# Patient Record
Sex: FEMALE | Race: BLACK OR AFRICAN AMERICAN | NOT HISPANIC OR LATINO | Employment: FULL TIME
[De-identification: names, ages, dates, MRNs, and addresses within clinical notes are randomized per-mention and may not be internally consistent; named-entity substitution may affect disease eponyms.]

---

## 2017-10-28 ENCOUNTER — HEALTH MAINTENANCE LETTER (OUTPATIENT)
Age: 22
End: 2017-10-28

## 2019-05-20 PROCEDURE — 96361 HYDRATE IV INFUSION ADD-ON: CPT

## 2019-05-20 PROCEDURE — 96366 THER/PROPH/DIAG IV INF ADDON: CPT

## 2019-05-20 PROCEDURE — 96365 THER/PROPH/DIAG IV INF INIT: CPT

## 2019-05-20 PROCEDURE — 99285 EMERGENCY DEPT VISIT HI MDM: CPT | Mod: 25

## 2019-05-21 ENCOUNTER — HOSPITAL ENCOUNTER (INPATIENT)
Facility: CLINIC | Age: 24
LOS: 4 days | Discharge: HOME | DRG: 638 | End: 2019-05-25
Attending: EMERGENCY MEDICINE | Admitting: HOSPITALIST
Payer: COMMERCIAL

## 2019-05-21 DIAGNOSIS — E10.10 DIABETIC KETOACIDOSIS WITHOUT COMA ASSOCIATED WITH TYPE 1 DIABETES MELLITUS (H): Primary | ICD-10-CM

## 2019-05-21 DIAGNOSIS — R06.02 SHORTNESS OF BREATH: ICD-10-CM

## 2019-05-21 PROBLEM — E11.10 DIABETIC KETO-ACIDOSIS (H): Status: ACTIVE | Noted: 2019-05-21

## 2019-05-21 LAB
ALBUMIN SERPL-MCNC: 4.1 G/DL (ref 3.4–5)
ALBUMIN UR-MCNC: NEGATIVE MG/DL
ALP SERPL-CCNC: 121 U/L (ref 40–150)
ALT SERPL W P-5'-P-CCNC: 26 U/L (ref 0–50)
ANION GAP SERPL CALCULATED.3IONS-SCNC: 11 MMOL/L (ref 3–14)
ANION GAP SERPL CALCULATED.3IONS-SCNC: 14 MMOL/L (ref 3–14)
APPEARANCE UR: CLEAR
AST SERPL W P-5'-P-CCNC: 14 U/L (ref 0–45)
BASE DEFICIT BLDV-SCNC: 10.7 MMOL/L
BASOPHILS # BLD AUTO: 0 10E9/L (ref 0–0.2)
BASOPHILS # BLD AUTO: 0 10E9/L (ref 0–0.2)
BASOPHILS NFR BLD AUTO: 0.2 %
BASOPHILS NFR BLD AUTO: 0.2 %
BILIRUB DIRECT SERPL-MCNC: 0.1 MG/DL (ref 0–0.2)
BILIRUB SERPL-MCNC: 0.5 MG/DL (ref 0.2–1.3)
BILIRUB UR QL STRIP: NEGATIVE
BUN SERPL-MCNC: 5 MG/DL (ref 7–30)
BUN SERPL-MCNC: 6 MG/DL (ref 7–30)
BUN SERPL-MCNC: 6 MG/DL (ref 7–30)
BUN SERPL-MCNC: 8 MG/DL (ref 7–30)
CALCIUM SERPL-MCNC: 8.4 MG/DL (ref 8.5–10.1)
CALCIUM SERPL-MCNC: 8.6 MG/DL (ref 8.5–10.1)
CALCIUM SERPL-MCNC: 8.8 MG/DL (ref 8.5–10.1)
CALCIUM SERPL-MCNC: 8.8 MG/DL (ref 8.5–10.1)
CHLORIDE SERPL-SCNC: 105 MMOL/L (ref 94–109)
CHLORIDE SERPL-SCNC: 106 MMOL/L (ref 94–109)
CHLORIDE SERPL-SCNC: 108 MMOL/L (ref 94–109)
CHLORIDE SERPL-SCNC: 99 MMOL/L (ref 94–109)
CO2 SERPL-SCNC: 16 MMOL/L (ref 20–32)
CO2 SERPL-SCNC: 18 MMOL/L (ref 20–32)
CO2 SERPL-SCNC: 19 MMOL/L (ref 20–32)
CO2 SERPL-SCNC: 20 MMOL/L (ref 20–32)
COLOR UR AUTO: ABNORMAL
CREAT SERPL-MCNC: 0.54 MG/DL (ref 0.52–1.04)
CREAT SERPL-MCNC: 0.56 MG/DL (ref 0.52–1.04)
CREAT SERPL-MCNC: 0.58 MG/DL (ref 0.52–1.04)
CREAT SERPL-MCNC: 0.62 MG/DL (ref 0.52–1.04)
DIFFERENTIAL METHOD BLD: NORMAL
DIFFERENTIAL METHOD BLD: NORMAL
EOSINOPHIL # BLD AUTO: 0.1 10E9/L (ref 0–0.7)
EOSINOPHIL # BLD AUTO: 0.1 10E9/L (ref 0–0.7)
EOSINOPHIL NFR BLD AUTO: 1.8 %
EOSINOPHIL NFR BLD AUTO: 2 %
ERYTHROCYTE [DISTWIDTH] IN BLOOD BY AUTOMATED COUNT: 12.4 % (ref 10–15)
ERYTHROCYTE [DISTWIDTH] IN BLOOD BY AUTOMATED COUNT: 12.7 % (ref 10–15)
GFR SERPL CREATININE-BSD FRML MDRD: >90 ML/MIN/{1.73_M2}
GLUCOSE BLDC GLUCOMTR-MCNC: 147 MG/DL (ref 70–99)
GLUCOSE BLDC GLUCOMTR-MCNC: 148 MG/DL (ref 70–99)
GLUCOSE BLDC GLUCOMTR-MCNC: 155 MG/DL (ref 70–99)
GLUCOSE BLDC GLUCOMTR-MCNC: 170 MG/DL (ref 70–99)
GLUCOSE BLDC GLUCOMTR-MCNC: 177 MG/DL (ref 70–99)
GLUCOSE BLDC GLUCOMTR-MCNC: 180 MG/DL (ref 70–99)
GLUCOSE BLDC GLUCOMTR-MCNC: 184 MG/DL (ref 70–99)
GLUCOSE BLDC GLUCOMTR-MCNC: 217 MG/DL (ref 70–99)
GLUCOSE BLDC GLUCOMTR-MCNC: 259 MG/DL (ref 70–99)
GLUCOSE BLDC GLUCOMTR-MCNC: 278 MG/DL (ref 70–99)
GLUCOSE BLDC GLUCOMTR-MCNC: 286 MG/DL (ref 70–99)
GLUCOSE BLDC GLUCOMTR-MCNC: 347 MG/DL (ref 70–99)
GLUCOSE BLDC GLUCOMTR-MCNC: 380 MG/DL (ref 70–99)
GLUCOSE BLDC GLUCOMTR-MCNC: 443 MG/DL (ref 70–99)
GLUCOSE BLDC GLUCOMTR-MCNC: 569 MG/DL (ref 70–99)
GLUCOSE SERPL-MCNC: 177 MG/DL (ref 70–99)
GLUCOSE SERPL-MCNC: 266 MG/DL (ref 70–99)
GLUCOSE SERPL-MCNC: 297 MG/DL (ref 70–99)
GLUCOSE SERPL-MCNC: 591 MG/DL (ref 70–99)
GLUCOSE UR STRIP-MCNC: >1000 MG/DL
HBA1C MFR BLD: 10.4 % (ref 0–5.6)
HCG SERPL QL: NEGATIVE
HCO3 BLDV-SCNC: 15 MMOL/L (ref 21–28)
HCT VFR BLD AUTO: 39.8 % (ref 35–47)
HCT VFR BLD AUTO: 41.5 % (ref 35–47)
HGB BLD-MCNC: 14.3 G/DL (ref 11.7–15.7)
HGB BLD-MCNC: 14.7 G/DL (ref 11.7–15.7)
HGB UR QL STRIP: ABNORMAL
IMM GRANULOCYTES # BLD: 0 10E9/L (ref 0–0.4)
IMM GRANULOCYTES # BLD: 0 10E9/L (ref 0–0.4)
IMM GRANULOCYTES NFR BLD: 0.2 %
IMM GRANULOCYTES NFR BLD: 0.3 %
KETONES BLD-SCNC: 0.9 MMOL/L (ref 0–0.6)
KETONES BLD-SCNC: 1.3 MMOL/L (ref 0–0.6)
KETONES BLD-SCNC: 1.4 MMOL/L (ref 0–0.6)
KETONES BLD-SCNC: 1.4 MMOL/L (ref 0–0.6)
KETONES BLD-SCNC: 2.3 MMOL/L (ref 0–0.6)
KETONES BLD-SCNC: 2.5 MMOL/L (ref 0–0.6)
KETONES BLD-SCNC: 3.3 MMOL/L (ref 0–0.6)
KETONES BLD-SCNC: NORMAL MMOL/L (ref 0–0.6)
KETONES UR STRIP-MCNC: 80 MG/DL
LACTATE BLD-SCNC: 0.6 MMOL/L (ref 0.7–2)
LEUKOCYTE ESTERASE UR QL STRIP: NEGATIVE
LYMPHOCYTES # BLD AUTO: 2 10E9/L (ref 0.8–5.3)
LYMPHOCYTES # BLD AUTO: 2.2 10E9/L (ref 0.8–5.3)
LYMPHOCYTES NFR BLD AUTO: 31.8 %
LYMPHOCYTES NFR BLD AUTO: 37.1 %
MAGNESIUM SERPL-MCNC: 1.9 MG/DL (ref 1.6–2.3)
MCH RBC QN AUTO: 28.9 PG (ref 26.5–33)
MCH RBC QN AUTO: 29.9 PG (ref 26.5–33)
MCHC RBC AUTO-ENTMCNC: 35.4 G/DL (ref 31.5–36.5)
MCHC RBC AUTO-ENTMCNC: 35.9 G/DL (ref 31.5–36.5)
MCV RBC AUTO: 82 FL (ref 78–100)
MCV RBC AUTO: 83 FL (ref 78–100)
MONOCYTES # BLD AUTO: 0.4 10E9/L (ref 0–1.3)
MONOCYTES # BLD AUTO: 0.5 10E9/L (ref 0–1.3)
MONOCYTES NFR BLD AUTO: 7.1 %
MONOCYTES NFR BLD AUTO: 7.5 %
MUCOUS THREADS #/AREA URNS LPF: PRESENT /LPF
NEUTROPHILS # BLD AUTO: 3.2 10E9/L (ref 1.6–8.3)
NEUTROPHILS # BLD AUTO: 3.6 10E9/L (ref 1.6–8.3)
NEUTROPHILS NFR BLD AUTO: 53 %
NEUTROPHILS NFR BLD AUTO: 58.8 %
NITRATE UR QL: NEGATIVE
NRBC # BLD AUTO: 0 10*3/UL
NRBC BLD AUTO-RTO: 0 /100
OSMOLALITY SERPL: 290 MMOL/KG (ref 275–295)
OXYHGB MFR BLDV: 78 %
PCO2 BLDV: 34 MM HG (ref 40–50)
PH BLDV: 7.26 PH (ref 7.32–7.43)
PH UR STRIP: 5 PH (ref 5–7)
PHOSPHATE SERPL-MCNC: 2.3 MG/DL (ref 2.5–4.5)
PLATELET # BLD AUTO: 298 10E9/L (ref 150–450)
PLATELET # BLD AUTO: 324 10E9/L (ref 150–450)
PO2 BLDV: 45 MM HG (ref 25–47)
POTASSIUM SERPL-SCNC: 3.6 MMOL/L (ref 3.4–5.3)
POTASSIUM SERPL-SCNC: 3.6 MMOL/L (ref 3.4–5.3)
POTASSIUM SERPL-SCNC: 3.8 MMOL/L (ref 3.4–5.3)
POTASSIUM SERPL-SCNC: 4.3 MMOL/L (ref 3.4–5.3)
PROT SERPL-MCNC: 7.6 G/DL (ref 6.8–8.8)
RBC # BLD AUTO: 4.79 10E12/L (ref 3.8–5.2)
RBC # BLD AUTO: 5.09 10E12/L (ref 3.8–5.2)
RBC #/AREA URNS AUTO: <1 /HPF (ref 0–2)
SODIUM SERPL-SCNC: 129 MMOL/L (ref 133–144)
SODIUM SERPL-SCNC: 134 MMOL/L (ref 133–144)
SODIUM SERPL-SCNC: 136 MMOL/L (ref 133–144)
SODIUM SERPL-SCNC: 139 MMOL/L (ref 133–144)
SOURCE: ABNORMAL
SP GR UR STRIP: 1.02 (ref 1–1.03)
SQUAMOUS #/AREA URNS AUTO: 4 /HPF (ref 0–1)
UROBILINOGEN UR STRIP-MCNC: NORMAL MG/DL (ref 0–2)
WBC # BLD AUTO: 6 10E9/L (ref 4–11)
WBC # BLD AUTO: 6.2 10E9/L (ref 4–11)
WBC #/AREA URNS AUTO: 1 /HPF (ref 0–5)

## 2019-05-21 PROCEDURE — 99223 1ST HOSP IP/OBS HIGH 75: CPT | Mod: AI | Performed by: HOSPITALIST

## 2019-05-21 PROCEDURE — 80076 HEPATIC FUNCTION PANEL: CPT | Performed by: INTERNAL MEDICINE

## 2019-05-21 PROCEDURE — 84703 CHORIONIC GONADOTROPIN ASSAY: CPT | Performed by: EMERGENCY MEDICINE

## 2019-05-21 PROCEDURE — 25000131 ZZH RX MED GY IP 250 OP 636 PS 637: Performed by: INTERNAL MEDICINE

## 2019-05-21 PROCEDURE — 25800030 ZZH RX IP 258 OP 636: Performed by: HOSPITALIST

## 2019-05-21 PROCEDURE — 82010 KETONE BODYS QUAN: CPT | Performed by: INTERNAL MEDICINE

## 2019-05-21 PROCEDURE — 25000131 ZZH RX MED GY IP 250 OP 636 PS 637: Performed by: HOSPITALIST

## 2019-05-21 PROCEDURE — 25800030 ZZH RX IP 258 OP 636: Performed by: EMERGENCY MEDICINE

## 2019-05-21 PROCEDURE — 00000146 ZZHCL STATISTIC GLUCOSE BY METER IP

## 2019-05-21 PROCEDURE — 84100 ASSAY OF PHOSPHORUS: CPT | Performed by: INTERNAL MEDICINE

## 2019-05-21 PROCEDURE — 83735 ASSAY OF MAGNESIUM: CPT | Performed by: INTERNAL MEDICINE

## 2019-05-21 PROCEDURE — 25000131 ZZH RX MED GY IP 250 OP 636 PS 637: Performed by: EMERGENCY MEDICINE

## 2019-05-21 PROCEDURE — 82010 KETONE BODYS QUAN: CPT | Performed by: EMERGENCY MEDICINE

## 2019-05-21 PROCEDURE — 25000132 ZZH RX MED GY IP 250 OP 250 PS 637: Performed by: INTERNAL MEDICINE

## 2019-05-21 PROCEDURE — 36415 COLL VENOUS BLD VENIPUNCTURE: CPT | Performed by: INTERNAL MEDICINE

## 2019-05-21 PROCEDURE — 12000000 ZZH R&B MED SURG/OB

## 2019-05-21 PROCEDURE — 80048 BASIC METABOLIC PNL TOTAL CA: CPT | Performed by: EMERGENCY MEDICINE

## 2019-05-21 PROCEDURE — 82805 BLOOD GASES W/O2 SATURATION: CPT | Performed by: EMERGENCY MEDICINE

## 2019-05-21 PROCEDURE — 83036 HEMOGLOBIN GLYCOSYLATED A1C: CPT | Performed by: INTERNAL MEDICINE

## 2019-05-21 PROCEDURE — 85025 COMPLETE CBC W/AUTO DIFF WBC: CPT | Performed by: EMERGENCY MEDICINE

## 2019-05-21 PROCEDURE — 25000128 H RX IP 250 OP 636: Performed by: INTERNAL MEDICINE

## 2019-05-21 PROCEDURE — 80048 BASIC METABOLIC PNL TOTAL CA: CPT | Performed by: INTERNAL MEDICINE

## 2019-05-21 PROCEDURE — 81001 URINALYSIS AUTO W/SCOPE: CPT | Performed by: EMERGENCY MEDICINE

## 2019-05-21 PROCEDURE — 83605 ASSAY OF LACTIC ACID: CPT | Performed by: INTERNAL MEDICINE

## 2019-05-21 PROCEDURE — 85025 COMPLETE CBC W/AUTO DIFF WBC: CPT | Performed by: INTERNAL MEDICINE

## 2019-05-21 PROCEDURE — 83930 ASSAY OF BLOOD OSMOLALITY: CPT | Performed by: INTERNAL MEDICINE

## 2019-05-21 PROCEDURE — 80048 BASIC METABOLIC PNL TOTAL CA: CPT | Performed by: HOSPITALIST

## 2019-05-21 PROCEDURE — 99207 ZZC NON-BILLABLE SERV PER CHARTING: CPT | Performed by: INTERNAL MEDICINE

## 2019-05-21 PROCEDURE — 36415 COLL VENOUS BLD VENIPUNCTURE: CPT | Performed by: HOSPITALIST

## 2019-05-21 RX ORDER — DEXTROSE MONOHYDRATE 25 G/50ML
25-50 INJECTION, SOLUTION INTRAVENOUS
Status: DISCONTINUED | OUTPATIENT
Start: 2019-05-21 | End: 2019-05-25 | Stop reason: HOSPADM

## 2019-05-21 RX ORDER — AMLODIPINE BESYLATE 5 MG/1
5 TABLET ORAL DAILY
Status: DISCONTINUED | OUTPATIENT
Start: 2019-05-21 | End: 2019-05-22

## 2019-05-21 RX ORDER — ACETAMINOPHEN 325 MG/1
325-650 TABLET ORAL EVERY 6 HOURS PRN
Status: DISCONTINUED | OUTPATIENT
Start: 2019-05-21 | End: 2019-05-21

## 2019-05-21 RX ORDER — ACETAMINOPHEN 325 MG/1
650 TABLET ORAL EVERY 4 HOURS PRN
Status: DISCONTINUED | OUTPATIENT
Start: 2019-05-21 | End: 2019-05-25 | Stop reason: HOSPADM

## 2019-05-21 RX ORDER — AMOXICILLIN 250 MG
1 CAPSULE ORAL 2 TIMES DAILY PRN
Status: DISCONTINUED | OUTPATIENT
Start: 2019-05-21 | End: 2019-05-25 | Stop reason: HOSPADM

## 2019-05-21 RX ORDER — ONDANSETRON 4 MG/1
4 TABLET, ORALLY DISINTEGRATING ORAL EVERY 6 HOURS PRN
Status: DISCONTINUED | OUTPATIENT
Start: 2019-05-21 | End: 2019-05-25 | Stop reason: HOSPADM

## 2019-05-21 RX ORDER — AMOXICILLIN 250 MG
2 CAPSULE ORAL 2 TIMES DAILY PRN
Status: DISCONTINUED | OUTPATIENT
Start: 2019-05-21 | End: 2019-05-25 | Stop reason: HOSPADM

## 2019-05-21 RX ORDER — SODIUM CHLORIDE AND POTASSIUM CHLORIDE 150; 900 MG/100ML; MG/100ML
INJECTION, SOLUTION INTRAVENOUS CONTINUOUS
Status: ACTIVE | OUTPATIENT
Start: 2019-05-21 | End: 2019-05-21

## 2019-05-21 RX ORDER — DEXTROSE MONOHYDRATE 25 G/50ML
25-50 INJECTION, SOLUTION INTRAVENOUS
Status: DISCONTINUED | OUTPATIENT
Start: 2019-05-21 | End: 2019-05-21

## 2019-05-21 RX ORDER — CETIRIZINE HYDROCHLORIDE 10 MG/1
10 TABLET ORAL DAILY PRN
Status: ON HOLD | COMMUNITY
End: 2020-03-11

## 2019-05-21 RX ORDER — ONDANSETRON 2 MG/ML
4 INJECTION INTRAMUSCULAR; INTRAVENOUS EVERY 6 HOURS PRN
Status: DISCONTINUED | OUTPATIENT
Start: 2019-05-21 | End: 2019-05-25 | Stop reason: HOSPADM

## 2019-05-21 RX ORDER — PROPRANOLOL HYDROCHLORIDE 80 MG/1
80 CAPSULE, EXTENDED RELEASE ORAL AT BEDTIME
Status: ON HOLD | COMMUNITY
Start: 2019-05-06 | End: 2019-05-23

## 2019-05-21 RX ORDER — NALOXONE HYDROCHLORIDE 0.4 MG/ML
.1-.4 INJECTION, SOLUTION INTRAMUSCULAR; INTRAVENOUS; SUBCUTANEOUS
Status: DISCONTINUED | OUTPATIENT
Start: 2019-05-21 | End: 2019-05-25 | Stop reason: HOSPADM

## 2019-05-21 RX ORDER — ACETAMINOPHEN 325 MG/1
325-650 TABLET ORAL EVERY 6 HOURS PRN
COMMUNITY

## 2019-05-21 RX ORDER — GLUCOSAMINE HCL/CHONDROITIN SU 500-400 MG
CAPSULE ORAL
Qty: 100 EACH | Refills: 3 | Status: SHIPPED | OUTPATIENT
Start: 2019-05-21 | End: 2019-05-22

## 2019-05-21 RX ORDER — SPIRONOLACTONE 25 MG/1
50 TABLET ORAL AT BEDTIME
Status: ON HOLD | COMMUNITY
Start: 2019-03-15 | End: 2019-05-23

## 2019-05-21 RX ORDER — NICOTINE POLACRILEX 4 MG
15-30 LOZENGE BUCCAL
Status: DISCONTINUED | OUTPATIENT
Start: 2019-05-21 | End: 2019-05-21

## 2019-05-21 RX ORDER — LIDOCAINE 40 MG/G
CREAM TOPICAL
Status: DISCONTINUED | OUTPATIENT
Start: 2019-05-21 | End: 2019-05-22

## 2019-05-21 RX ORDER — LISINOPRIL 40 MG/1
40 TABLET ORAL DAILY
Status: DISCONTINUED | OUTPATIENT
Start: 2019-05-21 | End: 2019-05-25 | Stop reason: HOSPADM

## 2019-05-21 RX ORDER — CHOLECALCIFEROL (VITAMIN D3) 50 MCG
4000 TABLET ORAL
Status: ON HOLD | COMMUNITY
Start: 2018-05-15 | End: 2019-05-21

## 2019-05-21 RX ORDER — ERGOCALCIFEROL 1.25 MG/1
50000 CAPSULE, LIQUID FILLED ORAL WEEKLY
COMMUNITY
End: 2022-11-15

## 2019-05-21 RX ORDER — LANCETS
EACH MISCELLANEOUS
Qty: 100 EACH | Refills: 6 | Status: SHIPPED | OUTPATIENT
Start: 2019-05-21 | End: 2019-05-22

## 2019-05-21 RX ORDER — AMLODIPINE BESYLATE 10 MG/1
10 TABLET ORAL AT BEDTIME
COMMUNITY

## 2019-05-21 RX ORDER — NICOTINE POLACRILEX 4 MG
15-30 LOZENGE BUCCAL
Status: DISCONTINUED | OUTPATIENT
Start: 2019-05-21 | End: 2019-05-25 | Stop reason: HOSPADM

## 2019-05-21 RX ADMIN — POTASSIUM CHLORIDE AND SODIUM CHLORIDE: 900; 150 INJECTION, SOLUTION INTRAVENOUS at 13:20

## 2019-05-21 RX ADMIN — POTASSIUM CHLORIDE AND SODIUM CHLORIDE: 900; 150 INJECTION, SOLUTION INTRAVENOUS at 18:12

## 2019-05-21 RX ADMIN — AMLODIPINE BESYLATE 5 MG: 5 TABLET ORAL at 18:44

## 2019-05-21 RX ADMIN — SODIUM CHLORIDE, POTASSIUM CHLORIDE, SODIUM LACTATE AND CALCIUM CHLORIDE 1000 ML: 600; 310; 30; 20 INJECTION, SOLUTION INTRAVENOUS at 00:40

## 2019-05-21 RX ADMIN — SODIUM CHLORIDE, POTASSIUM CHLORIDE, SODIUM LACTATE AND CALCIUM CHLORIDE 1000 ML: 600; 310; 30; 20 INJECTION, SOLUTION INTRAVENOUS at 02:14

## 2019-05-21 RX ADMIN — SODIUM CHLORIDE 10 UNITS/HR: 9 INJECTION, SOLUTION INTRAVENOUS at 06:09

## 2019-05-21 RX ADMIN — SODIUM CHLORIDE 3 UNITS/HR: 9 INJECTION, SOLUTION INTRAVENOUS at 20:33

## 2019-05-21 RX ADMIN — POTASSIUM CHLORIDE AND SODIUM CHLORIDE: 900; 150 INJECTION, SOLUTION INTRAVENOUS at 09:08

## 2019-05-21 RX ADMIN — SODIUM CHLORIDE: 9 INJECTION, SOLUTION INTRAVENOUS at 19:31

## 2019-05-21 RX ADMIN — INSULIN ASPART 3 UNITS: 100 INJECTION, SOLUTION INTRAVENOUS; SUBCUTANEOUS at 22:49

## 2019-05-21 RX ADMIN — SODIUM CHLORIDE 7 UNITS/HR: 9 INJECTION, SOLUTION INTRAVENOUS at 22:12

## 2019-05-21 RX ADMIN — INSULIN GLARGINE 30 UNITS: 100 INJECTION, SOLUTION SUBCUTANEOUS at 22:12

## 2019-05-21 RX ADMIN — SODIUM CHLORIDE: 9 INJECTION, SOLUTION INTRAVENOUS at 08:23

## 2019-05-21 RX ADMIN — LISINOPRIL 40 MG: 40 TABLET ORAL at 18:44

## 2019-05-21 RX ADMIN — SODIUM CHLORIDE 5.5 UNITS/HR: 9 INJECTION, SOLUTION INTRAVENOUS at 04:45

## 2019-05-21 RX ADMIN — SODIUM CHLORIDE: 9 INJECTION, SOLUTION INTRAVENOUS at 12:54

## 2019-05-21 ASSESSMENT — ACTIVITIES OF DAILY LIVING (ADL)
ADLS_ACUITY_SCORE: 10

## 2019-05-21 ASSESSMENT — ENCOUNTER SYMPTOMS
SHORTNESS OF BREATH: 1
ABDOMINAL PAIN: 1
FREQUENCY: 1

## 2019-05-21 ASSESSMENT — MIFFLIN-ST. JEOR
SCORE: 2110.32
SCORE: 2265.25

## 2019-05-21 NOTE — ED NOTES
"[facility]  ED Nurse Handoff Report    ED Chief complaint: Shortness of Breath and Hyperglycemia      ED Diagnosis:   Final diagnoses:   None       Code Status: Full Code    Allergies:   Allergies   Allergen Reactions     No Known Allergies        Activity level - Baseline/Home:  Independent    Activity Level - Current:   Independent     Needed?: No    Isolation: No  Infection: Not Applicable  Bariatric?: Yes    Vital Signs:   Vitals:    05/21/19 0000 05/21/19 0123   BP: (!) 173/102 (!) 155/96   Pulse: 115 99   Resp: 18 18   Temp: 98.6  F (37  C)    TempSrc: Oral    SpO2: 98% 98%   Weight: 127 kg (280 lb)    Height: 1.778 m (5' 10\")        Cardiac Rhythm: ,        Pain level:      Is this patient confused?: No   Does this patient have a guardian?  No         If yes, is there guardianship documents in the Epic \"Code/ACP\" activity?  N/A         Guardian Notified?  N/A  [place] - Suicide Severity Rating Scale Completed?  Yes  If yes, what color did the patient score?  White    Patient Report: Initial Complaint: Patient stated that she has had had increased thirst and urination and has been feeling short of breath for the past week and she I worried that her blood sugar is high. Patient was told in April that she is \"pre-diabetic\" but has not received any treatment yet. Found to have  on arrival in the ED.  Focused Assessment: Patient is alert and oriented x 4, calm and cooperative. VS are stable, skin is warm and dry, respirations appear to be even and non-labored at rest and on room air. Patient denied chest pain, shortness of breath, and nausea but complains of some blurred vision and intermittent nausea.     Tests Performed: labs   Abnormal Results:   Labs Ordered and Resulted from Time of ED Arrival Up to the Time of Departure from the ED   GLUCOSE BY METER - Abnormal; Notable for the following components:       Result Value    Glucose 569 (*)     All other components within " normal limits   ROUTINE UA WITH MICROSCOPIC - Abnormal; Notable for the following components:    Glucose Urine >1000 (*)     Ketones Urine 80 (*)     Blood Urine Trace (*)     Squamous Epithelial /HPF Urine 4 (*)     Mucous Urine Present (*)     All other components within normal limits   BASIC METABOLIC PANEL - Abnormal; Notable for the following components:    Sodium 129 (*)     Carbon Dioxide 16 (*)     Glucose 591 (*)     All other components within normal limits   KETONE BETA-HYDROXYBUTYRATE QUANTITATIVE - Abnormal; Notable for the following components:    Ketone Quantitative 3.3 (*)     All other components within normal limits   BLOOD GAS VENOUS AND OXYHGB - Abnormal; Notable for the following components:    Ph Venous 7.26 (*)     PCO2 Venous 34 (*)     Bicarbonate Venous 15 (*)     All other components within normal limits   GLUCOSE BY METER - Abnormal; Notable for the following components:    Glucose 443 (*)     All other components within normal limits   CBC WITH PLATELETS DIFFERENTIAL   HCG QUALITATIVE   GLUCOSE MONITOR NURSING POCT   GLUCOSE MONITOR NURSING POCT       Treatments provided: IV fluid    Family Comments: No family present at this time.     OBS brochure/video discussed/provided to patient/family: Yes              Name of person given brochure if not patient: NA              Relationship to patient: NA    ED Medications:   Medications   lactated ringers BOLUS 1,000 mL (0 mLs Intravenous Stopped 5/21/19 0213)   lactated ringers BOLUS 1,000 mL (1,000 mLs Intravenous New Bag 5/21/19 0214)       Drips infusing?:  Yes    For the majority of the shift this patient was Green.   Interventions performed were NA.    Severe Sepsis OR Septic Shock Diagnosis Present: No    To be done/followed up on inpatient unit:  Treat hyerglycemia    ED NURSE PHONE NUMBER: [phone]

## 2019-05-21 NOTE — ED NOTES
DATE:  5/21/2019   TIME OF RECEIPT FROM LAB:  0105  LAB TEST:  Blood Glucose  LAB VALUE:  591  RESULTS GIVEN WITH READ-BACK TO (PROVIDER):  Dennis De Leon MD  TIME LAB VALUE REPORTED TO PROVIDER:   0105

## 2019-05-21 NOTE — ED PROVIDER NOTES
"  History     Chief Complaint:  Hyperglycemia    HPI   Mónica Aly is a 23 year old female with a history of hypertension and prediabetes who presents with hyperglycemia. The patient reports that for the past week she has been having increased urination and excessive thirst. For the past 2 days she states she has additionally been feeling short of breath, had blurred vision and some left-sided abdominal cramping. Tonight, out of concern that her symptoms may be due to hyperglycemia she states she decided to come to the ED for further evaluation. Of note, the patient states she last saw her PCP on 4/27 and had blood work completed. Her blood glucose was 325 at that time.    Allergies:  No known drug allergies.     Medications:    Norvasc  Inderal LA  Aldactone  Cholecalciferol    Past Medical History:    Hypertension  Prediabetes  Bell's Palsy  Allergic rhinitis  Migraine    Past Surgical History:    Tonsillectomy    Family History:    Diabetes, type 2  Hypertension    Social History:  Smoking Status: Never Smoker  Alcohol Use: No  Patient presents alone.  Marital Status:  Single     Review of Systems   Constitutional:        + Thirst   Eyes: Positive for visual disturbance (blurred vision).   Respiratory: Positive for shortness of breath.    Gastrointestinal: Positive for abdominal pain.   Genitourinary: Positive for frequency.   All other systems reviewed and are negative.    Physical Exam     Patient Vitals for the past 24 hrs:   BP Temp Temp src Pulse Resp SpO2 Height Weight   05/21/19 0123 (!) 155/96 -- -- 99 18 98 % -- --   05/21/19 0000 (!) 173/102 98.6  F (37  C) Oral 115 18 98 % 1.778 m (5' 10\") 127 kg (280 lb)     Physical Exam  General: Appears well-developed and well-nourished.   Head: No signs of trauma.   Mouth/Throat: Oropharynx is clear and moist.   CV: Normal rate and regular rhythm.    Resp: Effort normal and breath sounds normal. No respiratory distress.   GI: Soft. There is no tenderness.  No " rebound or guarding.  Normal bowel sounds.  No CVA tenderness.  MSK: Normal range of motion. no edema. No Calf tenderness.  Neuro: The patient is alert and oriented.  Strength in upper/lower extremities normal and symmetrical.   Sensation normal. Speech normal.  GCS 15  Skin: Skin is warm and dry. No rash noted.   Psych: normal mood and affect. behavior is normal.       Emergency Department Course     Laboratory:    0010: Glucose by meter: 569 (HH)  0317: Glucose by meter: 443 (H)  0440: Glucose by meter: 380 (H)    0027: BMP: Sodium 129 (L), Carbon Dioxide 16 (L), Glucose 591 (HH), o/w AWNL (Creatinine 0.58)    0028: ISTAT blood gas venous and oxyhgb: pH: 7.26 (L), PCO2: 34 (L), PO2: 45, Bicarbonate: 15 (L), Oxyhgb: 78, Base Deficit 10.7    0111: Ketone Beta Hydroxybutyrate Quantitative: 3.3 (HH)    HCG Qualitative pregnancy (blood): Negative    CBC: AWNL (WBC 6.2, HGB 14.7, )    UA: Glucose Urine >1000 (A), Ketones Urine 80 (A), Blood Urine Trace (A), Squamous Epithelial/ HPF Urine 4 (H), Mucous Urine Present (A), o/w Negative    Interventions:    0040: Lactated ringers 1L IV Bolus  0214: Lactated ringers 1L IV Bolus  0445: Insulin 1 unit/1 mL in saline drip - DKA algorithm 5.5 Units/hr  0609: Insuline 1 unit/1 mL in saline drip - DKA algorithm 10 Units/hr    Emergency Department Course:  Past medical records, nursing notes, and vitals reviewed.  0026: I performed an exam of the patient and obtained history, as documented above.    IV inserted and blood drawn.    0333: Rechecked the patient, findings and plan explained to the patient, who consents to admission.     0420: Discussed the patient with Dr. Franco, who will admit the patient to a monitored bed for further observation, evaluation, and treatment.     Impression & Plan      Medical Decision Making:  Mónica Aly is a 23-year-old woman who presents with concern for elevated blood sugar.  She reports that she has been told that she had some  borderline blood sugar but that she is not diabetic.  Over the last number of days she has been very thirsty with frequent urination.  She has also had some abdominal discomfort and blurred vision.  Blood sugar was checked upon arrival which came back at over 500.  Blood work was obtained that showed a low pH and elevated ketones, although her anion gap was overall appropriate.  Patient's findings would be consistent with DKA.  She was given IV fluids and ultimately started on insulin drip and admitted to the hospitalist service.      Diagnosis:    ICD-10-CM    1. Shortness of breath R06.02 Glucose by meter     Glucose by meter       Disposition:  Admitted to hospital.      Shannon Schulz  5/20/2019    EMERGENCY DEPARTMENT  I, Shannon Schulz, am serving as a scribe at 1:32 AM on 5/21/2019 to document services personally performed by Dennis De Leon MD based on my observations and the provider's statements to me.        Dennis De Leon MD  05/21/19 0737

## 2019-05-21 NOTE — PLAN OF CARE
Dx: New onset T1 diabetes. VSS on RA. Tele: SR. A/Ox4. Blood sugars: Pt is in algorithm 2, BS in the mid 150's. CMS intact pulses palpable. Pt denies pain. Up with SBA. Tolerating Pt is tolerating water and Ice does not want to eat as of yet. Pt is [employer], but is not adhering to Ramadan fasting do to medical condition per patient. Denies N&V. +gas, -bm this shift. Did not void for this shift. LS diminished. Will continue to monitor.

## 2019-05-21 NOTE — PROVIDER NOTIFICATION
ROSA Leiva @ 18:10 on 5/21/19--Change Lantus to 30 units daily at bedtime.  [facility]

## 2019-05-21 NOTE — PROGRESS NOTES
History and Physical - Hospitalist Service       Date of Admission:  5/21/2019        Assessment & Plan     Mónica Aly is a 23 year old obese female with history of hypertension and prediabetes who presented with hyperglycemia associated with 2 days of blurred vision and increased urination/thirst.  She has been found to have elevated serum ketones, hyperglycemia up to 591 initially, HCO3 16. She has been started on IVF and insulin gtt per DKA protocol.    Afternoon patient has no nausea or vomiting, denies shortness of breath or pleurisy.  Her blurred vision is improving.     DKA  History of prediabetes  - Several days of increased urination and thirst, blurred vision, intermittent nausea. No fevers or chills. No infectious etiology noted. Likely a progression of her prediabetes.  - initial blood sugar 591, HCO3 16, anion gap 14, serum ketones 3.3, urine ketones 3.3    - DKA protocol with IVF and insulin gtt initiated, patient averaging 5 units an hour.  Will switch to Lantus 50 units at bedtime with sliding scale coverage  Unclear if this is type 1 diabetes or atypical presentation of type II.  Defer to outpatient endocrinologist regarding possibility of starting metformin in addition to insulin.  - Diabetes education and outpatient follow up      Hypertension normally patient takes amlodipine 10 mg, propranolol 80 mg, spironolactone 25 mg.  Start patient on lisinopril 40 mg and amlodipine 5 mg daily.  Avoid beta-blocker due to risk of ineffectual hypoglycemic response.       Obesity  - Lifestyle modifications as outpatient are paramount     Diet: diabetic diet ok once tolerating PO  DVT Prophylaxis: Pneumatic Compression Devices  [place] Catheter: not present  Code Status: Full Code          Disposition Plan     Expected discharge:  Wednesday, resolution of DKA, initiation of insulin regimen.   Entered:  Donis Leiva MD 17:20

## 2019-05-21 NOTE — ED NOTES
DATE:  5/21/2019   TIME OF RECEIPT FROM LAB:  0117  LAB TEST:  Ketone  LAB VALUE:  3.3  RESULTS GIVEN WITH READ-BACK TO (PROVIDER):  Dennis De Leon MD  TIME LAB VALUE REPORTED TO PROVIDER:   0117

## 2019-05-21 NOTE — PLAN OF CARE
Pt transfer from ED to station [facility] with insulin drip-DKA alogrithm protocol. A&O x4, VSS except tachy . Neuros & CMS intact. Up independently to bathroom. Denies pain. Father at bedside & supportive. Will continue to monitor.

## 2019-05-21 NOTE — ED TRIAGE NOTES
Patient states she is concerned about her blood sugars as she has been having more tingling in her hands and feet and voiding more frequently.

## 2019-05-21 NOTE — PHARMACY-ADMISSION MEDICATION HISTORY
Admission medication history interview status for the 5/21/2019  admission is complete. See EPIC admission navigator for prior to admission medications     Medication history source reliability:Good    Actions taken by pharmacist (provider contacted, etc):None     Additional medication history information not noted on PTA med list :None    Medication reconciliation/reorder completed by provider prior to medication history? N/A    Time spent in this activity: 15 minutes      Prior to Admission medications    Medication Sig Last Dose Taking? Auth Provider   acetaminophen (TYLENOL) 325 MG tablet Take 325-650 mg by mouth every 6 hours as needed for headaches unknown Yes Unknown, Entered By History   amLODIPine (NORVASC) 10 MG tablet Take 10 mg by mouth At Bedtime  5/20/2019 at 10pm Yes Reported, Patient   cetirizine (ZYRTEC) 10 MG tablet Take 10 mg by mouth daily as needed for allergies or rhinitis unknown Yes Unknown, Entered By History   propranolol ER (INDERAL LA) 80 MG 24 hr capsule Take 80 mg by mouth At Bedtime  5/20/2019 at 10pm Yes Reported, Patient   spironolactone (ALDACTONE) 25 MG tablet Take 50 mg by mouth At Bedtime  5/20/2019 at 10pm Yes Reported, Patient   vitamin D2 (ERGOCALCIFEROL) 55030 units (1250 mcg) capsule Take 50,000 Units by mouth once a week On Thursdays  Yes Unknown, Entered By History

## 2019-05-21 NOTE — H&P
[facility]    History and Physical - Hospitalist Service       Date of Admission:  5/21/2019    Assessment & Plan   Mónica Aly is a 23 year old obese female with history of hypertension and prediabetes who presented with hyperglycemia associated with 2 days of blurred vision and increased urination/thirst.  She has been found to have elevated serum ketones, hyperglycemia up to 591 initially, HCO3 16. She has been started on IVF and insulin gtt per DKA protocol.    DKA  History of prediabetes  - Several days of increased urination and thirst, blurred vision, intermittent nausea. No fevers or chills. No infectious etiology noted. Likely a progression of her prediabetes.  - initial blood sugar 591, HCO3 16, anion gap 14, serum ketones 3.3, urine ketones positive also  - s/p 1L LR in ED  - DKA protocol with IVF and insulin gtt initiated in ED; currently on D5 1/2 NS at 250  - Will recheck BMP at 6am  - Follow DKA protocol with fluid adjustments - later today can likely be transitioned to subcu insulin with 2 hour overlap with gtt as usual  - initial potassium 4.3, continue to monitor  - HbA1c ordered  - Diabetes education and outpatient follow up     Hypertension  - PTA regimen can be continued when verified    Obesity  - Lifestyle modifications as outpatient are paramount    Diet: diabetic diet ok once tolerating PO  DVT Prophylaxis: Pneumatic Compression Devices  [place] Catheter: not present  Code Status: Full Code    Disposition Plan   Expected discharge: 2 days likely, resolution of DKA, initiation of insulin regimen.   Entered: Felipe Franco MD 05/21/2019, 4:30 AM     The patient's care was discussed with the Patient and ER MD.    Felipe Franco MD  [facility]    ______________________________________________________________________    Chief Complaint   Hyperglycemia, blurred vision, increased thirst & urination    History is obtained from the patient    History of Present  Illness   Mónica Aly is a 23 year old obese female with history of hypertension and prediabetes who presented with hyperglycemia associated with 2 days of blurred vision and increased urination/thirst.  For the past several days she has been very thirsty and has had increased urination-no dysuria.  She has not had any vomiting or diarrhea but has had some nausea in the last couple days.  She is been eating normally and actually think she has been eating more healthy as of late.  Otherwise she denies any fevers, chills, coughing, abdominal pain or nonhealing wounds.  She has previously been diagnosed with prediabetes.      In the ED she is afebrile and hemodynamically stable saturating normally on room air.  BMP notable for sodium 129 potassium 4.3 normal renal function initial blood sugar of 560.  CBC: WBC 6.2 Hgb 14.7 platelets 324  Serum quantitative ketones elevated at 3.3.  Anion gap 14.  She has been started on IV fluids and insulin per DKA protocol.  She is feeling a bit better and says that her blurred vision which is mostly distance has improved.  We discussed the spectrum of DKA and she had no further questions.  Plan for admission and further management of quasi-DKA (normal anion gap).     Review of Systems    The 10 point Review of Systems is negative other than noted in the HPI or here.     Past Medical History    I have reviewed this patient's medical history and updated it with pertinent information if needed.   Past Medical History:   Diagnosis Date     Bell palsy April 2014     Hypertension        Past Surgical History   I have reviewed this patient's surgical history and updated it with pertinent information if needed.  Past Surgical History:   Procedure Laterality Date     C NONSPECIFIC PROCEDURE      Tonsillectomy       Social History   I have reviewed this patient's social history and updated it with pertinent information if needed.  Social History     Tobacco Use     Smoking status: Never  Smoker   Substance Use Topics     Alcohol use: No     Drug use: No       Family History   I have reviewed this patient's family history and updated it with pertinent information if needed.   No family history on file.    Prior to Admission Medications   Prior to Admission Medications   Prescriptions Last Dose Informant Patient Reported? Taking?   amLODIPine (NORVASC) 10 MG tablet   Yes Yes   Sig: Take 10 mg by mouth   propranolol ER (INDERAL LA) 80 MG 24 hr capsule   Yes Yes   Sig: Take 80 mg by mouth   spironolactone (ALDACTONE) 25 MG tablet   Yes Yes   Sig: Take 50 mg by mouth   vitamin D3 (CHOLECALCIFEROL) 2000 units (50 mcg) tablet   Yes Yes   Sig: Take 4,000 Units by mouth      Facility-Administered Medications: None     Allergies   Allergies   Allergen Reactions     No Known Allergies        Physical Exam   Vital Signs: Temp: 98.6  F (37  C) Temp src: Oral BP: (!) 155/96 Pulse: 99   Resp: 18 SpO2: 98 %      Weight: 280 lbs 0 oz    Gen: NAD, pleasant  HEENT: Normocephalic, EOMI, MMM  Resp: no crackles,  no wheezes, no increased work of resp  CV: S1S2 heard, reg rhythm, reg rate, no pitting pedal edema  Abdo: soft, nontender, nondistended, bowel sounds present  Ext: calves nontender, well perfused  Neuro: AAOx3, CN grossly intact, no facial asymmetry      Data   Data reviewed today: I reviewed all medications, new labs and imaging results over the last 24 hours. I personally reviewed no images or EKG's today.    Recent Labs   Lab 05/21/19 0027   WBC 6.2   HGB 14.7   MCV 82      *   POTASSIUM 4.3   CHLORIDE 99   CO2 16*   BUN 8   CR 0.58   ANIONGAP 14   GAVIN 8.6   *     Most Recent 3 CBC's:  Recent Labs   Lab Test 05/21/19  0027 08/12/15  0057   WBC 6.2 8.6   HGB 14.7 13.9   MCV 82 84    301     Most Recent 3 BMP's:  Recent Labs   Lab Test 05/21/19  0027 08/12/15  0057   * 139   POTASSIUM 4.3 3.2*   CHLORIDE 99 108   CO2 16* 24   BUN 8 7   CR 0.58 0.64   ANIONGAP 14 7   GAVIN 8.6  7.5*   * 118*     Serum ketones, quantitative: 3.3    Most Recent Urinalysis:  Recent Labs   Lab Test 05/21/19  0027   COLOR Straw   APPEARANCE Clear   URINEGLC >1000*   URINEBILI Negative   URINEKETONE 80*   SG 1.022   UBLD Trace*   URINEPH 5.0   PROTEIN Negative   NITRITE Negative   LEUKEST Negative   RBCU <1   WBCU 1     No results found for this or any previous visit (from the past 24 hour(s)).

## 2019-05-21 NOTE — LETTER
Transition Communication Hand-off for Care Transitions to Next Level of Care Provider    Name: Mónica Aly  : 1995  MRN #: [id number]  Primary Care Provider: Ximena Og     Primary Clinic: [facility] [address]  [facility] [address]     Reason for Hospitalization:  Shortness of breath [R06.02]  Admit Date/Time: 2019 12:02 AM  Discharge Date: 2019  Payor Source: Payor: [employer] / Plan: [employer] MA / Product Type: HMO /     Readmission Assessment Measure (KELL) Risk Score/category: low         Reason for Communication Hand-off Referral: Other new diabetic, needs close follow up    Discharge Plan:  Home today on insulin, taught with glucometer and insulin. To see PCP within a week for BMP and hospital follow up. Patient chooses to make her own appointments.      Concern for non-adherence with plan of care:   Y/N no  Discharge Needs Assessment:      Follow-up specialty is recommended: No    Follow-up plan:  No future appointments.  Patient is choosing to maker her own appointments.   Any outstanding tests or procedures:              Key Recommendations:  See her PCP in 5-7 days with a BMP    Pam Ocampo    AVS/Discharge Summary is the source of truth; this is a helpful guide for improved communication of patient story

## 2019-05-22 LAB
ANION GAP SERPL CALCULATED.3IONS-SCNC: 9 MMOL/L (ref 3–14)
BUN SERPL-MCNC: 7 MG/DL (ref 7–30)
CALCIUM SERPL-MCNC: 8.6 MG/DL (ref 8.5–10.1)
CHLORIDE SERPL-SCNC: 109 MMOL/L (ref 94–109)
CO2 SERPL-SCNC: 19 MMOL/L (ref 20–32)
CREAT SERPL-MCNC: 0.85 MG/DL (ref 0.52–1.04)
GFR SERPL CREATININE-BSD FRML MDRD: >90 ML/MIN/{1.73_M2}
GLUCOSE BLDC GLUCOMTR-MCNC: 158 MG/DL (ref 70–99)
GLUCOSE BLDC GLUCOMTR-MCNC: 159 MG/DL (ref 70–99)
GLUCOSE BLDC GLUCOMTR-MCNC: 165 MG/DL (ref 70–99)
GLUCOSE BLDC GLUCOMTR-MCNC: 223 MG/DL (ref 70–99)
GLUCOSE BLDC GLUCOMTR-MCNC: 230 MG/DL (ref 70–99)
GLUCOSE BLDC GLUCOMTR-MCNC: 271 MG/DL (ref 70–99)
GLUCOSE BLDC GLUCOMTR-MCNC: 280 MG/DL (ref 70–99)
GLUCOSE BLDC GLUCOMTR-MCNC: 304 MG/DL (ref 70–99)
GLUCOSE BLDC GLUCOMTR-MCNC: 424 MG/DL (ref 70–99)
GLUCOSE BLDC GLUCOMTR-MCNC: 427 MG/DL (ref 70–99)
GLUCOSE SERPL-MCNC: 276 MG/DL (ref 70–99)
POTASSIUM SERPL-SCNC: 4 MMOL/L (ref 3.4–5.3)
SODIUM SERPL-SCNC: 137 MMOL/L (ref 133–144)

## 2019-05-22 PROCEDURE — 99232 SBSQ HOSP IP/OBS MODERATE 35: CPT | Performed by: INTERNAL MEDICINE

## 2019-05-22 PROCEDURE — 36415 COLL VENOUS BLD VENIPUNCTURE: CPT | Performed by: INTERNAL MEDICINE

## 2019-05-22 PROCEDURE — 99207 ZZC CDG-MDM COMPONENT: MEETS LOW - DOWN CODED: CPT | Performed by: INTERNAL MEDICINE

## 2019-05-22 PROCEDURE — 80048 BASIC METABOLIC PNL TOTAL CA: CPT | Performed by: INTERNAL MEDICINE

## 2019-05-22 PROCEDURE — 12000000 ZZH R&B MED SURG/OB

## 2019-05-22 PROCEDURE — 00000146 ZZHCL STATISTIC GLUCOSE BY METER IP

## 2019-05-22 PROCEDURE — 25000131 ZZH RX MED GY IP 250 OP 636 PS 637: Performed by: INTERNAL MEDICINE

## 2019-05-22 PROCEDURE — 25000132 ZZH RX MED GY IP 250 OP 250 PS 637: Performed by: INTERNAL MEDICINE

## 2019-05-22 RX ORDER — GLUCOSAMINE HCL/CHONDROITIN SU 500-400 MG
CAPSULE ORAL
Qty: 100 EACH | Refills: 3 | Status: SHIPPED | OUTPATIENT
Start: 2019-05-22

## 2019-05-22 RX ORDER — AMLODIPINE BESYLATE 10 MG/1
10 TABLET ORAL DAILY
Status: DISCONTINUED | OUTPATIENT
Start: 2019-05-23 | End: 2019-05-25 | Stop reason: HOSPADM

## 2019-05-22 RX ORDER — LANCETS
EACH MISCELLANEOUS
Qty: 100 EACH | Refills: 6 | Status: SHIPPED | OUTPATIENT
Start: 2019-05-22

## 2019-05-22 RX ADMIN — LISINOPRIL 40 MG: 40 TABLET ORAL at 09:01

## 2019-05-22 RX ADMIN — INSULIN ASPART 5 UNITS: 100 INJECTION, SOLUTION INTRAVENOUS; SUBCUTANEOUS at 21:31

## 2019-05-22 RX ADMIN — INSULIN GLARGINE 5 UNITS: 100 INJECTION, SOLUTION SUBCUTANEOUS at 13:50

## 2019-05-22 RX ADMIN — AMLODIPINE BESYLATE 5 MG: 5 TABLET ORAL at 09:01

## 2019-05-22 RX ADMIN — INSULIN GLARGINE 40 UNITS: 100 INJECTION, SOLUTION SUBCUTANEOUS at 21:34

## 2019-05-22 ASSESSMENT — ACTIVITIES OF DAILY LIVING (ADL)
ADLS_ACUITY_SCORE: 10

## 2019-05-22 NOTE — PROGRESS NOTES
[facility]    HOSPITALIST PROGRESS NOTE :   --------------------------------------------------    Date of Admission:  5/21/2019    Cumulative Summary: Mónica Aly is a 23 year old female with past medical history significant for essential hypertension, obesity and prediabetes who presented with hyperglycemia associated with 2 days of blurred vision and increased urination and thirst.  She was found to have elevated serum ketones, hyperglycemia up to 591, bicarb of 16 and was found to be in diabetic ketoacidosis with new diagnosis of diabetes mellitus.  Patient was a started on insulin infusion as per DKA protocol.  She was a started on 30 units of Lantus last night and her insulin infusion was a stopped.    Assessment & Plan     Active Problems:  Diabetic keto-acidosis (H) (5/21/2019): Resolved, patient has been able to come off the insulin infusion last night and was given first dose of Lantus last night.  Her blood sugars a still remain high this morning.  Newly diagnosed Diabetes mellitus presenting with DKA: Newly diagnosed diabetes mellitus with presentation of DKA, patient did have a known history of prediabetes, long conversation with patient and her mother present in the room regarding the pathophysiology of diabetes.    --Continue to monitor patient closely.  --At this time patient just came off the insulin infusion and is not ready to be discharged as her blood sugars have been running anywhere from 200 400 range and she is at risk for going back into DKA.  Hypertension.   -Controlled  -Continue current medications  -- start patient on meal coverage with carb count , as it would be beneficial for patient to learn how to count carb's  -- will post nutritionist  -- Diabetes education as an out patient  -- continue on high dose Insulin  -- Increase Lantus to 40 units at bedtime, will also give her 5 units times 1 right now, she might need further adjustment of Lantus tomorrow after  observing her blood sugars on current insulin.  -- Continue high dose sliding scale insulin  -- will order glucometer today so patient can learn how to use her glucometer   -- Long discussion with patient and her mother regarding the importance of losing weight and even exploring options like bariatric surgery as an out patient     Essential hypertension patient usually takes amlodipine 10 mg, propranolol 80 mg and Aldactone 25 mg as an outpatient.  --Patient has been a started on lisinopril 40 mg and Norvasc 5 mg daily, will increase her Norvasc to 10 mg p.o. daily  --Her beta-blocker is a stopped due to the risk of affecting hypoglycemic response.    Obesity  --Lifestyle modifications as an outpatient are paramount, I did have discussion with patient regarding even discussing about bariatric surgery as an outpatient.    Diet: Moderate Consistent CHO Diet    [place] Catheter: not present  DVT Prophylaxis: Pneumatic Compression Devices  Code Status: Full Code    The patient's care was discussed with the Bedside Nurse, Patient and Patient's Family.    Disposition Plan   Expected discharge: Tomorrow, recommended to prior living arrangement once patient has learned how to check her insulin and administer it personally .    Entered: Nicole Knowles MD 05/22/2019, 11:45 AM       Nicole Knowles MD, FACP  Text Page (7am - 6pm)    ----------------------------------------------------------------------------------------------------------------------      Interval History   Patient care was assumed, seen and examined, mother also present in the room , long discussion with patient and her mother regarding pathophysiology of diabetes and importance of being compliant with insulin, we also discussed about carb coverage and sliding scale insulin.  I assured the patient that she will benefit from continuing diabetes education as an outpatient so she can continue to learn further and further regarding her disease and how to manage that.   We also discussed about close follow-up with the primary care physician after the discharge and possibly getting established with endocrinology.  We also discussed about importance of losing weight.    -Data reviewed today: I reviewed all new labs and imaging results over the last 24 hours.    I personally reviewed no images or EKG's today.    Physical Exam   Temp: 99  F (37.2  C) Temp src: Oral BP: 122/72 Pulse: 93 Heart Rate: 99 Resp: 16 SpO2: 96 % O2 Device: None (Room air)    Vitals:    05/21/19 0000 05/21/19 0638   Weight: 127 kg (280 lb) 142.5 kg (314 lb 2.5 oz)     Vital Signs with Ranges  Temp:  [98.3  F (36.8  C)-99  F (37.2  C)] 99  F (37.2  C)  Pulse:  [] 93  Heart Rate:  [] 99  Resp:  [16-27] 16  BP: (114-164)/() 122/72  SpO2:  [96 %-100 %] 96 %  I/O last 3 completed shifts:  In: 1370 [P.O.:1370]  Out: 375 [Urine:375]    GENERAL: Alert , awake and oriented. NAD. Conversational, appropriate.polite young female , mother also present in room, morbidly obese  HEENT: Normocephalic. EOMI. No icterus or injection. Nares normal.   LUNGS: Clear to auscultation. No dyspnea at rest.   HEART: Regular rate. Extremities perfused.   ABDOMEN: Soft, nontender, and nondistended. Positive bowel sounds.   EXTREMITIES: No LE edema noted.   NEUROLOGIC: Moves extremities x4 on command. No acute focal neurologic abnormalities noted.     Medications     - MEDICATION INSTRUCTIONS -         amLODIPine  5 mg Oral Daily     insulin aspart  1-10 Units Subcutaneous TID AC     insulin aspart  1-7 Units Subcutaneous At Bedtime     insulin aspart   Subcutaneous QAM AC     insulin aspart   Subcutaneous Daily with lunch     insulin aspart   Subcutaneous Daily with supper     insulin glargine  30 Units Subcutaneous At Bedtime     lisinopril  40 mg Oral Daily     sodium chloride (PF)  3 mL Intracatheter Q8H       Data   Recent Labs   Lab 05/22/19  0801 05/21/19  1204 05/21/19  0821  05/21/19  0027   WBC  --   --  6.0  --   6.2   HGB  --   --  14.3  --  14.7   MCV  --   --  83  --  82   PLT  --   --  298  --  324    139 136   < > 129*   POTASSIUM 4.0 3.8 3.6   < > 4.3   CHLORIDE 109 108 106   < > 99   CO2 19* 20 19*   < > 16*   BUN 7 6* 5*   < > 8   CR 0.85 0.62 0.56   < > 0.58   ANIONGAP 9 11 11   < > 14   GAVIN 8.6 8.4* 8.8   < > 8.6   * 177* 266*   < > 591*   ALBUMIN  --   --  4.1  --   --    PROTTOTAL  --   --  7.6  --   --    BILITOTAL  --   --  0.5  --   --    ALKPHOS  --   --  121  --   --    ALT  --   --  26  --   --    AST  --   --  14  --   --     < > = values in this interval not displayed.       Imaging:   No results found for this or any previous visit (from the past 24 hour(s)).

## 2019-05-22 NOTE — PLAN OF CARE
A/OX4. AVSS. LS clear. Tele: ,and 427. MD notified. Coverage given along with one time dose of Lantus.  Mod carb diet tolerating well. Denies pain or SOB. Neuros intact. Voiding adequately. Up independently. Educated on indications, of sliding scale and lantus and carb counting. New diabetic education needed. Awaiting glucometer from discharge pharmacy.

## 2019-05-22 NOTE — PLAN OF CARE
Dx:DKA Hx:HTN Vs:stable on room air. A/O:x4. Neuro's:WDL. Blood Sugars:304 and 307. CWMS:WDL. Pain level/controlled with:Denies. Up with:Independent. Tolerating mod carb, carb count diet. No N/V. Passing gas. BM:No. Voiding adequately. Gi:active x4. Resp:diminished lung sounds. Patient educated on how to use insulin pens and demonstrated how to use. Plan: Will continue monitor.

## 2019-05-22 NOTE — PLAN OF CARE
Patient is alert and oriented x 5, AVSS. Dr. Gonzalez notified of elevated ketones. No changed to plan. Lantus given at 2200 pm plan is to discontinue insulin gtt at midnight. Up voiding on her own.

## 2019-05-22 NOTE — PLAN OF CARE
A+Ox4 AVSS on room air. Tele NSR. LS clear. BGs in 230s. Insulin gtt stopped at midnight per order and IMC dc'd. Adequate I/O. Denies nausea. CMS intact. Up SBA.

## 2019-05-23 LAB
ANION GAP SERPL CALCULATED.3IONS-SCNC: 6 MMOL/L (ref 3–14)
BUN SERPL-MCNC: 10 MG/DL (ref 7–30)
CALCIUM SERPL-MCNC: 8.5 MG/DL (ref 8.5–10.1)
CHLORIDE SERPL-SCNC: 109 MMOL/L (ref 94–109)
CO2 SERPL-SCNC: 22 MMOL/L (ref 20–32)
CREAT SERPL-MCNC: 0.81 MG/DL (ref 0.52–1.04)
GFR SERPL CREATININE-BSD FRML MDRD: >90 ML/MIN/{1.73_M2}
GLUCOSE BLDC GLUCOMTR-MCNC: 283 MG/DL (ref 70–99)
GLUCOSE BLDC GLUCOMTR-MCNC: 287 MG/DL (ref 70–99)
GLUCOSE BLDC GLUCOMTR-MCNC: 307 MG/DL (ref 70–99)
GLUCOSE BLDC GLUCOMTR-MCNC: 317 MG/DL (ref 70–99)
GLUCOSE BLDC GLUCOMTR-MCNC: 334 MG/DL (ref 70–99)
GLUCOSE BLDC GLUCOMTR-MCNC: 365 MG/DL (ref 70–99)
GLUCOSE SERPL-MCNC: 272 MG/DL (ref 70–99)
POTASSIUM SERPL-SCNC: 3.6 MMOL/L (ref 3.4–5.3)
SODIUM SERPL-SCNC: 137 MMOL/L (ref 133–144)

## 2019-05-23 PROCEDURE — 25000132 ZZH RX MED GY IP 250 OP 250 PS 637: Performed by: INTERNAL MEDICINE

## 2019-05-23 PROCEDURE — 80048 BASIC METABOLIC PNL TOTAL CA: CPT | Performed by: INTERNAL MEDICINE

## 2019-05-23 PROCEDURE — 36415 COLL VENOUS BLD VENIPUNCTURE: CPT | Performed by: INTERNAL MEDICINE

## 2019-05-23 PROCEDURE — 00000146 ZZHCL STATISTIC GLUCOSE BY METER IP

## 2019-05-23 PROCEDURE — 25000131 ZZH RX MED GY IP 250 OP 636 PS 637: Performed by: INTERNAL MEDICINE

## 2019-05-23 PROCEDURE — 12000000 ZZH R&B MED SURG/OB

## 2019-05-23 RX ORDER — LISINOPRIL 40 MG/1
40 TABLET ORAL DAILY
Qty: 30 TABLET | Refills: 0 | Status: SHIPPED | OUTPATIENT
Start: 2019-05-23

## 2019-05-23 RX ORDER — SODIUM CHLORIDE 9 MG/ML
INJECTION, SOLUTION INTRAVENOUS CONTINUOUS
Status: DISCONTINUED | OUTPATIENT
Start: 2019-05-23 | End: 2019-05-23

## 2019-05-23 RX ADMIN — AMLODIPINE BESYLATE 10 MG: 10 TABLET ORAL at 09:53

## 2019-05-23 RX ADMIN — INSULIN GLARGINE 50 UNITS: 100 INJECTION, SOLUTION SUBCUTANEOUS at 22:33

## 2019-05-23 RX ADMIN — INSULIN ASPART 6 UNITS: 100 INJECTION, SOLUTION INTRAVENOUS; SUBCUTANEOUS at 22:33

## 2019-05-23 RX ADMIN — LISINOPRIL 40 MG: 40 TABLET ORAL at 09:54

## 2019-05-23 ASSESSMENT — ACTIVITIES OF DAILY LIVING (ADL)
ADLS_ACUITY_SCORE: 10

## 2019-05-23 NOTE — PLAN OF CARE
Patient A/Ox4. Up independent. Mod carb diet.  and 365. CMS intact, denies pain. Patient educated on how to manage diabetes at home. Nurse and patient walked through how to use the home glucometer. Patient demonstrated how to take own blood sugar, determine how many units to be given and self administered with the novolog pen. Stated feeling more comfortable going home with supplies. Advised to follow up with PCP within the next week for further management and when to call.

## 2019-05-23 NOTE — DISCHARGE SUMMARY
[facility]  Hospitalist Discharge Summary       Date of Admission:  5/21/2019  Date of Discharge:  5/25/2019  Discharging Provider: Nicole Knowles MD      Discharge Diagnoses   Newly diagnosed diabetes mellitus, insulin-dependent, most likely type II.  Diabetic ketoacidosis, resolved.  Essential hypertension.  Morbid obesity.    Follow-ups Needed After Discharge   Follow-up Appointments     Follow-up and recommended labs and tests      Follow up with primary care provider, Ximena Og, within 5-7 days   to evaluate treatment change and for hospital follow- up.  The following   labs/tests are recommended: BMP.  Patient also needs follow up with diabetes education.        FOR PCP   Patient is a started on Lantus 50 units at the bedtime, might need further adjustment.  At this time patient is also discharged on short acting insulin for meal coverage with carb counting and sliding scale insulin, patient will probably need refills.  Her Aldactone and propranolol was stopped as she was a started on 40 mg of lisinopril, she is a still taking her Norvasc 10 mg p.o. daily.  Please follow-up on her blood pressure closely.  Diabetes education referral was provided at discharge.  Please follow-up on lab results to differentiate between type I and type 2 diabetes.    Unresulted Labs Ordered in the Past 30 Days of this Admission     No orders found from 3/22/2019 to 5/22/2019.        Discharge Disposition   Discharged to home  Condition at discharge: Stable    Hospital Course   Cumulative Summary: Mónica Aly is a 23 year old female with past medical history significant for essential hypertension, obesity and prediabetes who presented with hyperglycemia associated with 2 days of blurred vision and increased urination and thirst.  She was found to have elevated serum ketones, hyperglycemia up to 591, bicarb of 16 and was found to be in diabetic ketoacidosis with new diagnosis of diabetes mellitus.  Patient  was a started on insulin infusion as per DKA protocol.  She was a started on 30 units of Lantus last night and her insulin infusion was a stopped.    Here are further details regarding her hospitalization :      Assessment & Plan  Active Problems:  Diabetic keto-acidosis (H) (5/21/2019): Resolved, patient has been able to come off the insulin infusion last night and was started on Lantus , her blood sugars are improved but will still need fine tuning of her insulin after discharge.  Newly diagnosed Diabetes mellitus presenting with DKA: Newly diagnosed diabetes mellitus with presentation of DKA, patient did have a known history of prediabetes, long conversation with patient and her mother yesterday and again with patient today regarding the pathophysiology of diabetes.  Patient is receptive to all the information given to her.     --Patient received 50 units of Lantus yesterday, will increase her Lantus to 55 units at the bedtime.  --She will also be discharged on NovoLog 1 unit for 10 g of carb for carbohydrate coverage along with sliding scale insulin currently she is on high dose sliding scale insulin.  --Patient has been seen by nutritionist and has learned how to count carbs.  --Diabetes education as an outpatient.  --Patient will also be using her own glucometer and insulin pens before discharge.  -- Discussed with patient in detail that her primary care physician will be adjusting her insulin further and how she should be documenting her blood sugars closely and then take those readings to her PCP for further adjustment.     Essential hypertension patient usually takes amlodipine 10 mg, propranolol 80 mg and Aldactone 25 mg as an outpatient.    --Patient has been a started on lisinopril 40 mg and is continued on Norvasc to 10 mg p.o. daily  --Her beta-blocker is a stopped due to the risk of affecting hypoglycemic response.  -- her Aldactone is also stopped at this point to avoid low blood pressure but  certainly can be started in future if her BP allows      Obesity  --Lifestyle modifications as an outpatient are paramount, I did have discussion with patient regarding even discussing about bariatric surgery as an outpatient.    Patient was seen and examined on the day of discharge ,she is feeling well, does not have any complaints , I did review the discharge medications and instructions with the patient and plan for her to follow up with the PCP after the hospitalization .patient was in agreement , she is discharged in stable condition to her home.    Consultations This Hospital Stay   NUTRITION SERVICES ADULT IP CONSULT    Code Status   Full Code    Time Spent on this Encounter   I, Nicole Knowles, personally saw the patient today and spent greater than 30 minutes discharging this patient.About 35 minutes were spent this morning with patient , most of the time was spent in education regarding diabetes and insulin management and symptoms of hypoglycemia.    Patient decided to stay later , due to blood sugars in 300 and feeling more lethargic , will cancel the discharge, start on fluids , change the meal coverage 1:5 , please consider this discharge summary as a progress notre for today , will change the billing     Nicole Knowles MD  [facility]  ______________________________________________________________________    Physical Exam   Vital Signs: Temp: 98.3  F (36.8  C) Temp src: Oral BP: 129/79 Pulse: 101 Heart Rate: 98 Resp: 18 SpO2: 98 % O2 Device: None (Room air)    Weight: 314 lbs 2.49 oz      GENERAL: Alert , awake and oriented. NAD. Conversational, appropriate.polite young female ,morbidly obese  HEENT: Normocephalic. EOMI. No icterus or injection. Nares normal.   LUNGS: Clear to auscultation. No dyspnea at rest.   HEART: Regular rate. Extremities perfused.   ABDOMEN: Soft, nontender, and nondistended. Positive bowel sounds.   EXTREMITIES: No LE edema noted.   NEUROLOGIC: Moves extremities x4  on command. No acute focal neurologic abnormalities noted.        Primary Care Physician   Ximena Og    Discharge Orders      Reason for your hospital stay    You were admitted to the hospital secondary to Diabetic ketoacidosis from high blood sugars.     Follow-up and recommended labs and tests    Follow up with primary care provider, Ximena Og, within 5-7 days to evaluate treatment change and for hospital follow- up.  The following labs/tests are recommended: BMP.  Patient also needs follow up with diabetes education.     Activity    Your activity upon discharge: activity as tolerated and no driving for today     Discharge Instructions    You are started on insulin for your diabetes , you will be taking Lantus ( long acting insulin once a day ) at night time , 50 units every day, your PCP can adjust your medicine further depending upon your blood sugars   You will also be taking Novolog ( short acting insulin ) for meal coverage and to cover your high blood sugars during the day.  Please always get hold of your PCP ahead of time so you don't run out of your Insulin.  Because of Diabetes your propranalol is topped as it can mask symptoms of low blood sugars , you are started on lisinopril 40 mg daily which is used to control blood pressure but it also provide protection to kidneys for diabetic patient   You will also not be taking your Aldactone at this time , you will continue to take your Norvasc.your family doctor can continue to adjust your medications further and might even add Aldactone ( spironolactone) back if your blood pressure is high when you follow up , please take your discharge instructions and your medication bottles with you when you go for follow up.     Full Code     Diet    Follow this diet upon discharge: Orders Placed This Encounter      Moderate Consistent CHO Diet         Significant Results and Procedures   Results for orders placed or performed during the hospital encounter  of 08/11/15   XR Chest 2 Views    Narrative    CHEST 2 VIEWS  8/12/2015 1:01 AM     HISTORY: Chest pain.    COMPARISON: None.    FINDINGS: The lungs are clear. Normal-sized cardiac silhouette.      Impression    IMPRESSION: No evidence of active cardiopulmonary disease.    DARVIN DUNCAN MD       Discharge Medications   Current Discharge Medication List      START taking these medications    Details   insulin aspart (NOVOLOG PEN) 100 UNIT/ML pen Please use 1 unit of Novolog for every 10 grams of carbohydrate , Also cover your blood sugars as per sliding scale.  Qty: 15 mL, Refills: 1    Comments: Future refills by PCP Dr. Ximena Og with phone number [phone].  Associated Diagnoses: Diabetic ketoacidosis without coma associated with type 1 diabetes mellitus (H)      insulin glargine (LANTUS SOLOSTAR PEN) 100 UNIT/ML pen Inject 50 Units Subcutaneous At Bedtime  Qty: 15 mL, Refills: 1    Comments: Future refills by PCP Dr. Ximena Og with phone number [phone].  Associated Diagnoses: Diabetic ketoacidosis without coma associated with type 1 diabetes mellitus (H)      insulin pen needle (31G X 8 MM) 31G X 8 MM miscellaneous 1 Box of 100 insulin pen needles to be dispensed with every insulin pen prescription  Qty: 100 each, Refills: 0    Associated Diagnoses: Diabetic ketoacidosis without coma associated with type 1 diabetes mellitus (H)      lisinopril (PRINIVIL/ZESTRIL) 40 MG tablet Take 1 tablet (40 mg) by mouth daily  Qty: 30 tablet, Refills: 0    Comments: Future refills by PCP Dr. Ximena Og with phone number [phone].  Associated Diagnoses: Diabetic ketoacidosis without coma associated with type 1 diabetes mellitus (H)         CONTINUE these medications which have NOT CHANGED    Details   acetaminophen (TYLENOL) 325 MG tablet Take 325-650 mg by mouth every 6 hours as needed for headaches      alcohol swab prep pads Use to swab area of injection/hola as directed.  Qty: 100 each,  Refills: 3    Associated Diagnoses: Diabetic ketoacidosis without coma associated with type 1 diabetes mellitus (H)      amLODIPine (NORVASC) 10 MG tablet Take 10 mg by mouth At Bedtime       blood glucose (NO BRAND SPECIFIED) test strip Use to test blood sugar 4 times daily or as directed. To accompany: Blood Glucose Monitor Brands: per insurance.  Qty: 100 strip, Refills: 6    Associated Diagnoses: Diabetic ketoacidosis without coma associated with type 1 diabetes mellitus (H)      blood glucose calibration (NO BRAND SPECIFIED) solution To accompany: Blood Glucose Monitor Brands: per insurance.  Qty: 1 Bottle, Refills: 3    Associated Diagnoses: Diabetic ketoacidosis without coma associated with type 1 diabetes mellitus (H)      blood glucose monitoring (NO BRAND SPECIFIED) meter device kit Use to test blood sugar 4 times daily or as directed. Preferred blood glucose meter OR supplies to accompany: Blood Glucose Monitor Brands: per insurance.  Qty: 1 kit, Refills: 0    Associated Diagnoses: Diabetic ketoacidosis without coma associated with type 1 diabetes mellitus (H)      cetirizine (ZYRTEC) 10 MG tablet Take 10 mg by mouth daily as needed for allergies or rhinitis      thin (NO BRAND SPECIFIED) lancets Use with lanceting device. To accompany: Blood Glucose Monitor Brands: per insurance.  Qty: 100 each, Refills: 6    Associated Diagnoses: Diabetic ketoacidosis without coma associated with type 1 diabetes mellitus (H)      vitamin D2 (ERGOCALCIFEROL) 21259 units (1250 mcg) capsule Take 50,000 Units by mouth once a week On Thursdays         STOP taking these medications       propranolol ER (INDERAL LA) 80 MG 24 hr capsule Comments:   Reason for Stopping:         spironolactone (ALDACTONE) 25 MG tablet Comments:   Reason for Stopping:             Allergies   Allergies   Allergen Reactions     No Known Allergies

## 2019-05-23 NOTE — CONSULTS
"NUTRITION EDUCATION      REASON FOR ASSESSMENT:  MD consult - new dx DM    NUTRITION HISTORY:  Information obtained from the pt  She had been dx'd with pre-diabetes but wasn't given any diet information.  She only eats 1 meal/day due to her schedule - dinner - but snacks frequently : chips, \"bad foods\".  For beverages she drinks mostly water, rarely soda pop.    CURRENT DIET:  Mod consistent carb    NUTRITION DIAGNOSIS:  Food- and nutrition-related knowledge deficit R/t no prior exposure to the information, AEB new dx DM    INTERVENTIONS:    Nutrition Prescription:  Continue current diet    Implementation:      *  Nutrition Education (Content):   A)  Provided handout \"Living Healthy with Diabetes\"   B)  Discussed consistent meal pattern, basic carb counting, label reading and portion control      *  Nutrition Education (Application):   A)  Discussed current eating habits and recommended alternative food choices      *  Anticipate good compliance      *  Diet Education - refer to Education Flowsheet    Goals:      *  Patient will verbalize understanding of the diet        *  All of the above goals met during the education session    Follow Up/Monitoring:      *  Provided RD contact information for future questions      *  Recommended Out-Patient Nutrition Referral     Tatyana Ling RD  Pager [phone] (M-F)            [phone] ([facility])          "

## 2019-05-23 NOTE — PROGRESS NOTES
Patient  MD notified. Advised that patient could stay one more night if not comfortable going home tonight.

## 2019-05-23 NOTE — PLAN OF CARE
VSS on RA. A/Ox4. Denies pain. Up independently. Mod carb diet, carb count. BS +, Flatus +. Voiding +. LS clear. Denies N/V. Possible discharge today. Continue to monitor.

## 2019-05-24 LAB
ANION GAP SERPL CALCULATED.3IONS-SCNC: 9 MMOL/L (ref 3–14)
BUN SERPL-MCNC: 11 MG/DL (ref 7–30)
CALCIUM SERPL-MCNC: 8.4 MG/DL (ref 8.5–10.1)
CHLORIDE SERPL-SCNC: 106 MMOL/L (ref 94–109)
CO2 SERPL-SCNC: 23 MMOL/L (ref 20–32)
CREAT SERPL-MCNC: 0.67 MG/DL (ref 0.52–1.04)
GFR SERPL CREATININE-BSD FRML MDRD: >90 ML/MIN/{1.73_M2}
GLUCOSE BLDC GLUCOMTR-MCNC: 278 MG/DL (ref 70–99)
GLUCOSE BLDC GLUCOMTR-MCNC: 292 MG/DL (ref 70–99)
GLUCOSE BLDC GLUCOMTR-MCNC: 293 MG/DL (ref 70–99)
GLUCOSE BLDC GLUCOMTR-MCNC: 298 MG/DL (ref 70–99)
GLUCOSE BLDC GLUCOMTR-MCNC: 315 MG/DL (ref 70–99)
GLUCOSE BLDC GLUCOMTR-MCNC: 316 MG/DL (ref 70–99)
GLUCOSE SERPL-MCNC: 295 MG/DL (ref 70–99)
POTASSIUM SERPL-SCNC: 3.7 MMOL/L (ref 3.4–5.3)
SODIUM SERPL-SCNC: 138 MMOL/L (ref 133–144)

## 2019-05-24 PROCEDURE — 36415 COLL VENOUS BLD VENIPUNCTURE: CPT | Performed by: INTERNAL MEDICINE

## 2019-05-24 PROCEDURE — 25000131 ZZH RX MED GY IP 250 OP 636 PS 637: Performed by: INTERNAL MEDICINE

## 2019-05-24 PROCEDURE — 00000146 ZZHCL STATISTIC GLUCOSE BY METER IP

## 2019-05-24 PROCEDURE — 80048 BASIC METABOLIC PNL TOTAL CA: CPT | Performed by: INTERNAL MEDICINE

## 2019-05-24 PROCEDURE — 25000132 ZZH RX MED GY IP 250 OP 250 PS 637: Performed by: INTERNAL MEDICINE

## 2019-05-24 PROCEDURE — 12000000 ZZH R&B MED SURG/OB

## 2019-05-24 PROCEDURE — 99232 SBSQ HOSP IP/OBS MODERATE 35: CPT | Performed by: INTERNAL MEDICINE

## 2019-05-24 PROCEDURE — 99207 ZZC CDG-MDM COMPONENT: MEETS LOW - DOWN CODED: CPT | Performed by: INTERNAL MEDICINE

## 2019-05-24 RX ADMIN — LISINOPRIL 40 MG: 40 TABLET ORAL at 09:56

## 2019-05-24 RX ADMIN — INSULIN GLARGINE 5 UNITS: 100 INJECTION, SOLUTION SUBCUTANEOUS at 10:54

## 2019-05-24 RX ADMIN — INSULIN ASPART 5 UNITS: 100 INJECTION, SOLUTION INTRAVENOUS; SUBCUTANEOUS at 21:55

## 2019-05-24 RX ADMIN — AMLODIPINE BESYLATE 10 MG: 10 TABLET ORAL at 09:56

## 2019-05-24 RX ADMIN — INSULIN GLARGINE 55 UNITS: 100 INJECTION, SOLUTION SUBCUTANEOUS at 21:54

## 2019-05-24 ASSESSMENT — ACTIVITIES OF DAILY LIVING (ADL)
ADLS_ACUITY_SCORE: 10

## 2019-05-24 NOTE — PROVIDER NOTIFICATION
Dr Gonzalez contacted:307-1 SH, T CRNA unalble to obtain IV access. DO we still need to start fluids? pt tolerating good PO.

## 2019-05-24 NOTE — PROVIDER NOTIFICATION
Paged Dr. Knowles, pt said that she would feel more comfortable staying one more night so she can better control her blood sugars. Pt stated that she still feels weak and tired.  Orders placed for normal saline to be infusing at 125 mL/hr and for a BMP to be drawn in the AM.  Provider also modified carb courting orders.

## 2019-05-24 NOTE — PLAN OF CARE
Vital Signs stable. Alert and Oriented, Neuros intact. Lung sounds clear throughout. Bowel sounds active and audible. Passing flatus. Mod Carb diet. Denies nausea. Adequate urinary output. CMS intact. Up independently. Pt has denied pain. Pt took own blood sugar for supper and able to verbalize how many units she would give herself.  Also pt able to carb count and accurately verbalized how many units she would carb cover.  Pt gave self insulin and stated that she felt more comfortable with checking her blood sugar and giving herself insulin.

## 2019-05-24 NOTE — PROGRESS NOTES
[facility]    HOSPITALIST PROGRESS NOTE :   --------------------------------------------------    Date of Admission:  5/21/2019    Cumulative Summary: Mónica Aly is a 23 year old female with past medical history significant for essential hypertension, obesity and prediabetes who presented with hyperglycemia associated with 2 days of blurred vision and increased urination and thirst.  She was found to have elevated serum ketones, hyperglycemia up to 591, bicarb of 16 and was found to be in diabetic ketoacidosis with new diagnosis of diabetes mellitus.  Patient was a started on insulin infusion as per DKA protocol.  She was a started on 30 units of Lantus last night and her insulin infusion was a stopped.  Lantus has been slowly increased to 50 units, her sliding scale insulin is high scale and her carb coverage is also been adjusted.  Her discharge yesterday was held as her blood sugars were still in 300s and patient was feeling slightly dizzy when her blood sugars were in 300s.    Assessment & Plan     Active Problems:  Diabetic keto-acidosis (H) (5/21/2019): Resolved, patient has been able to come off the insulin infusion but since then has been requiring aggressively increase in her Lantus and sliding scale along with carb coverage.  She has received 50 units of Lantus last night her blood sugars remain 295 this morning.  Newly diagnosed Diabetes mellitus presenting with DKA: Newly diagnosed diabetes mellitus with presentation of DKA,  patient did have a known history of prediabetes, long conversation with patient and her mother present in the room regarding the pathophysiology of diabetes.  Not sure if patient is presenting with latent autoimmune diabetes in adult      --Continue to monitor patient closely.  --Will go ahead and give patient 5 more units of Lantus this morning, patient will also be receiving 55 units at the nighttime tonight.  --Patient carb coverage has been changed 1 unit of  NovoLog for 5 g of carb from 1 unit of NovoLog for 10 g of carb, patient has received nearly change carb coverage only last night, will continue to monitor today how she does with the new meal coverage in the next 24 hours.  --Continue patient on high-dose sliding scale insulin.  -- considering her young age , to differentiate between type 1 and 2 diabetes , will order AJAY antibodies, Insulin antibody and Islet cell antibodies , results might need to followed by PCP and will let PCP decide if she needs to be established with endocrinology also.  --Patient has been learning how to administer insulin herself and how to count carbs.  --She is feeling much better this morning, just feeling tired but now denying any lightheadedness and dizziness that she was feeling yesterday.  --Long discussion with patient today regarding plan for further adjustment in insulin and if she feels comfortable going home IVR is still increasing her Lantus, patient is requesting to his stay and observe how she does with increased dose of Lantus and change carb coverage  -- BMP results were reviewed to make sure that she is not going back into DKA due to her symptoms yesterday evening.  -- Glucometer is ordered so patient can learn how to use her glucometer   -- Long discussion with patient and her mother regarding the importance of losing weight and even exploring options like bariatric surgery as an out patient  -- Of note patient will also require out patient regular follow up with podiatry and opthalmology    Essential hypertension patient usually takes amlodipine 10 mg, propranolol 80 mg and Aldactone 25 mg as an outpatient.    --Patient has been a started on lisinopril 40 mg and is getting her PTA Norvasc 10 mg p.o. daily  --Her beta-blocker is a stopped due to the risk of affecting hypoglycemic response.  -- Her Aldactone is also stopped at this point to avoid low blood pressure but certainly can be started in future if her BP allows      Obesity  --Lifestyle modifications as an outpatient are paramount, I did have discussion with patient regarding even discussing about bariatric surgery as an outpatient.    Diet: Moderate Consistent CHO Diet  Diet    [place] Catheter: not present  DVT Prophylaxis: Pneumatic Compression Devices  Code Status: Full Code    The patient's care was discussed with the Bedside Nurse, Patient and Patient's Family.    Disposition Plan   Expected discharge: Tomorrow,if patient blood sugars are still not in 300,s and she is tolerating the current increase in her Insulin .    One of my Hospitalist colleague will assume the care from tomorrow , of note her insulin dose might need to be adjusted on the discharge med's. Thanks     Entered: Nicole Knowles MD 05/24/2019, 9:01 AM       Nicole Knowles MD, FACP  Text Page (7am - 6pm)    ----------------------------------------------------------------------------------------------------------------------      Interval History    Patient was seen and examined this morning, sitting in bed, feeling much better does not have any further dizziness or lightheadedness just feeling tired, her blood sugars are 298 this morning close to 300 long conversation regarding if she feels comfortable increasing the Lantus tonight and going home versus staying in hospital.  At this time patient would like to monitor her blood sugars for a day while her carb count has been changed and also we are planning to increase her Lantus further tonight.  Discussed the plan of care with bedside RN also.    -Data reviewed today: I reviewed all new labs and imaging results over the last 24 hours.    I personally reviewed no images or EKG's today.    Physical Exam   Temp: 98.2  F (36.8  C) Temp src: Oral BP: 122/82 Pulse: 75 Heart Rate: 89 Resp: 18 SpO2: 99 % O2 Device: None (Room air)    Vitals:    05/21/19 0000 05/21/19 0638   Weight: 127 kg (280 lb) 142.5 kg (314 lb 2.5 oz)     Vital Signs with Ranges  Temp:  [98.2  F  (36.8  C)-98.4  F (36.9  C)] 98.2  F (36.8  C)  Pulse:  [75] 75  Heart Rate:  [89] 89  Resp:  [18-19] 18  BP: (122-130)/(82-86) 122/82  SpO2:  [98 %-99 %] 99 %  No intake/output data recorded.    GENERAL: Alert , awake and oriented. NAD. Conversational, appropriate.polite young female, morbidly obese  HEENT: Normocephalic. EOMI. No icterus or injection. Nares normal.   LUNGS: Clear to auscultation. No dyspnea at rest.   HEART: Regular rate. Extremities perfused.   ABDOMEN: Soft, nontender, and nondistended. Positive bowel sounds.   EXTREMITIES: No LE edema noted.   NEUROLOGIC: Moves extremities x4 on command. No acute focal neurologic abnormalities noted.     Medications     - MEDICATION INSTRUCTIONS -         amLODIPine  10 mg Oral Daily     insulin aspart   Subcutaneous TID w/meals     insulin aspart  1-10 Units Subcutaneous TID AC     insulin aspart  1-7 Units Subcutaneous At Bedtime     insulin glargine  5 Units Subcutaneous Once     insulin glargine  55 Units Subcutaneous At Bedtime     lisinopril  40 mg Oral Daily       Data   Recent Labs   Lab 05/24/19  0737 05/23/19  0807 05/22/19  0801  05/21/19  0821  05/21/19  0027   WBC  --   --   --   --  6.0  --  6.2   HGB  --   --   --   --  14.3  --  14.7   MCV  --   --   --   --  83  --  82   PLT  --   --   --   --  298  --  324    137 137   < > 136   < > 129*   POTASSIUM 3.7 3.6 4.0   < > 3.6   < > 4.3   CHLORIDE 106 109 109   < > 106   < > 99   CO2 23 22 19*   < > 19*   < > 16*   BUN 11 10 7   < > 5*   < > 8   CR 0.67 0.81 0.85   < > 0.56   < > 0.58   ANIONGAP 9 6 9   < > 11   < > 14   GAVIN 8.4* 8.5 8.6   < > 8.8   < > 8.6   * 272* 276*   < > 266*   < > 591*   ALBUMIN  --   --   --   --  4.1  --   --    PROTTOTAL  --   --   --   --  7.6  --   --    BILITOTAL  --   --   --   --  0.5  --   --    ALKPHOS  --   --   --   --  121  --   --    ALT  --   --   --   --  26  --   --    AST  --   --   --   --  14  --   --     < > = values in this interval not  displayed.       Imaging:   No results found for this or any previous visit (from the past 24 hour(s)).

## 2019-05-25 VITALS
TEMPERATURE: 98.3 F | OXYGEN SATURATION: 99 % | SYSTOLIC BLOOD PRESSURE: 130 MMHG | HEART RATE: 90 BPM | RESPIRATION RATE: 18 BRPM | WEIGHT: 293 LBS | BODY MASS INDEX: 41.95 KG/M2 | HEIGHT: 70 IN | DIASTOLIC BLOOD PRESSURE: 73 MMHG

## 2019-05-25 LAB
ANION GAP SERPL CALCULATED.3IONS-SCNC: 6 MMOL/L (ref 3–14)
BUN SERPL-MCNC: 12 MG/DL (ref 7–30)
CALCIUM SERPL-MCNC: 8.5 MG/DL (ref 8.5–10.1)
CHLORIDE SERPL-SCNC: 108 MMOL/L (ref 94–109)
CO2 SERPL-SCNC: 25 MMOL/L (ref 20–32)
CREAT SERPL-MCNC: 0.61 MG/DL (ref 0.52–1.04)
GFR SERPL CREATININE-BSD FRML MDRD: >90 ML/MIN/{1.73_M2}
GLUCOSE BLDC GLUCOMTR-MCNC: 207 MG/DL (ref 70–99)
GLUCOSE BLDC GLUCOMTR-MCNC: 212 MG/DL (ref 70–99)
GLUCOSE BLDC GLUCOMTR-MCNC: 243 MG/DL (ref 70–99)
GLUCOSE SERPL-MCNC: 218 MG/DL (ref 70–99)
POTASSIUM SERPL-SCNC: 3.5 MMOL/L (ref 3.4–5.3)
SODIUM SERPL-SCNC: 139 MMOL/L (ref 133–144)

## 2019-05-25 PROCEDURE — 86341 ISLET CELL ANTIBODY: CPT | Performed by: INTERNAL MEDICINE

## 2019-05-25 PROCEDURE — 99207 ZZC MOONLIGHTING INDICATOR: CPT | Performed by: INTERNAL MEDICINE

## 2019-05-25 PROCEDURE — 80048 BASIC METABOLIC PNL TOTAL CA: CPT | Performed by: INTERNAL MEDICINE

## 2019-05-25 PROCEDURE — 99239 HOSP IP/OBS DSCHRG MGMT >30: CPT | Performed by: INTERNAL MEDICINE

## 2019-05-25 PROCEDURE — 25000132 ZZH RX MED GY IP 250 OP 250 PS 637: Performed by: INTERNAL MEDICINE

## 2019-05-25 PROCEDURE — 86337 INSULIN ANTIBODIES: CPT | Performed by: INTERNAL MEDICINE

## 2019-05-25 PROCEDURE — 00000146 ZZHCL STATISTIC GLUCOSE BY METER IP

## 2019-05-25 PROCEDURE — 36415 COLL VENOUS BLD VENIPUNCTURE: CPT | Performed by: INTERNAL MEDICINE

## 2019-05-25 RX ADMIN — LISINOPRIL 40 MG: 40 TABLET ORAL at 09:04

## 2019-05-25 RX ADMIN — AMLODIPINE BESYLATE 10 MG: 10 TABLET ORAL at 09:04

## 2019-05-25 ASSESSMENT — ACTIVITIES OF DAILY LIVING (ADL)
ADLS_ACUITY_SCORE: 10

## 2019-05-25 NOTE — PROGRESS NOTES
Paged Dr. Tom regarding a 's note excusing the patient for days missed at school. Page returned and note will be posted to be printed at discharge.

## 2019-05-25 NOTE — DISCHARGE INSTRUCTIONS
HIGH INSULIN RESISTANCE DOSING    Do Not give Correction Insulin if Pre-Meal BG less than 140.  For Pre-Meal  - 164 give 1 unit.  For Pre-Meal  - 189 give 2 units.  For Pre-Meal  - 214 give 3 units.  For Pre-Meal  - 239 give 4 units.   For Pre-Meal  - 264 give 5 units.  For Pre-Meal  - 289 give 6 units.  For Pre-Meal  - 314 give 7 units.  For Pre-Meal  - 339 give 8 units.   For Pre-Meal  - 364 give 9 units.  For Pre-Meal BG greater than or equal to 365 give 10 units To be given with prandial insulin, and based on pre-meal blood glucose.If given at mealtime, administer within 30 minutes of start of meal    You are started on insulin for your diabetes , you will be taking Lantus ( long acting insulin once a day ) at night time , 55 units every day, your PCP can adjust your medicine further depending upon your blood sugars   You will also be taking Novolog ( short acting insulin ) for meal coverage and to cover your high blood sugars during the day.  Please always get hold of your PCP ahead of time so you don't run out of your Insulin.  Because of Diabetes your propranalol is topped as it can mask symptoms of low blood sugars , you are started on lisinopril 40 mg daily which is used to control blood pressure but it also provide protection to kidneys for diabetic patient   You will also not be taking your Aldactone at this time , you will continue to take your Norvasc.your family doctor can continue to adjust your medications further and might even add Aldactone ( spironolactone) back if your blood pressure is high when you follow up , please take your discharge instructions and your medication bottles with you when you go for follow up.

## 2019-05-25 NOTE — SIGNIFICANT EVENT
BriMónica was hospitalized from 5/21-5/25.      If you have any questions, please contact me.      Dr. Gabriel Tom  [phone]

## 2019-05-25 NOTE — PLAN OF CARE
Patient education given on diabetes care. Administration instructions given, examples of equipment use given, questions and concerns answered. Discharged at 1740 with all belongings and medications, mother and sister present.

## 2019-05-25 NOTE — PLAN OF CARE
A&Ox4. AVSS. IND. LS clear. BS active, passing flatus. Insulin administered by pt. Plan to discharge this afternoon.

## 2019-05-25 NOTE — PROVIDER NOTIFICATION
Notified Dr Tom regarding discharge order needed and insulin carb count changed to 1 unit per 5 grams of carbs and lantus changed to 55 units.    Per Dr Tom okay to change discharge instructions to insulin carb count of 1 unit per 5 grams of carbs and lantus to 55 units at night.

## 2019-05-25 NOTE — PLAN OF CARE
A&Ox4 AVSS. Denies pain. LS clear. Tolerating mod carb diet. IND. BS active, passing flatus. LS clear. Pt administers insulin.

## 2019-05-26 LAB — PANC ISLET CELL AB TITR SER: NORMAL {TITER}

## 2019-05-27 LAB
GAD65 AB SER IA-ACNC: <5 IU/ML (ref 0–5)
GLUCOSE BLDC GLUCOMTR-MCNC: 215 MG/DL (ref 70–99)

## 2019-05-28 LAB — INSULIN HUMAN AB SER-ACNC: <0.4 U/ML (ref 0–0.4)

## 2019-05-30 ENCOUNTER — HOSPITAL ENCOUNTER (EMERGENCY)
Facility: CLINIC | Age: 24
Discharge: HOME | End: 2019-05-30
Attending: PHYSICIAN ASSISTANT | Admitting: PHYSICIAN ASSISTANT
Payer: COMMERCIAL

## 2019-05-30 VITALS
DIASTOLIC BLOOD PRESSURE: 87 MMHG | WEIGHT: 293 LBS | RESPIRATION RATE: 18 BRPM | OXYGEN SATURATION: 99 % | HEIGHT: 68 IN | BODY MASS INDEX: 44.41 KG/M2 | SYSTOLIC BLOOD PRESSURE: 128 MMHG | TEMPERATURE: 98.3 F

## 2019-05-30 DIAGNOSIS — L03.119 CELLULITIS AND ABSCESS OF LEG: ICD-10-CM

## 2019-05-30 DIAGNOSIS — L02.419 CELLULITIS AND ABSCESS OF LEG: ICD-10-CM

## 2019-05-30 PROCEDURE — 25000132 ZZH RX MED GY IP 250 OP 250 PS 637: Performed by: PHYSICIAN ASSISTANT

## 2019-05-30 PROCEDURE — 99283 EMERGENCY DEPT VISIT LOW MDM: CPT | Mod: 25

## 2019-05-30 PROCEDURE — 10060 I&D ABSCESS SIMPLE/SINGLE: CPT

## 2019-05-30 RX ORDER — SULFAMETHOXAZOLE/TRIMETHOPRIM 800-160 MG
1 TABLET ORAL ONCE
Status: COMPLETED | OUTPATIENT
Start: 2019-05-30 | End: 2019-05-30

## 2019-05-30 RX ORDER — SULFAMETHOXAZOLE/TRIMETHOPRIM 800-160 MG
1 TABLET ORAL 2 TIMES DAILY
Qty: 14 TABLET | Refills: 0 | Status: SHIPPED | OUTPATIENT
Start: 2019-05-30 | End: 2019-06-06

## 2019-05-30 RX ORDER — CEPHALEXIN 500 MG/1
500 CAPSULE ORAL 4 TIMES DAILY
Qty: 28 CAPSULE | Refills: 0 | Status: SHIPPED | OUTPATIENT
Start: 2019-05-30 | End: 2019-06-06

## 2019-05-30 RX ADMIN — SULFAMETHOXAZOLE AND TRIMETHOPRIM 1 TABLET: 800; 160 TABLET ORAL at 21:23

## 2019-05-30 ASSESSMENT — MIFFLIN-ST. JEOR: SCORE: 2350.73

## 2019-05-30 NOTE — ED AVS SNAPSHOT
Emergency Department  [address] [place] [id number]  Phone:  [phone]  Fax:  [phone]                                    Mónica Aly   MRN: [id number]    Department:   Emergency Department   Date of Visit:  5/30/2019           After Visit Summary Signature Page    I have received my discharge instructions, and my questions have been answered. I have discussed any challenges I see with this plan with the nurse or doctor.    ..........................................................................................................................................  Patient/Patient Representative Signature      ..........................................................................................................................................  Patient Representative Print Name and Relationship to Patient    ..................................................               ................................................  Date                                   Time    ..........................................................................................................................................  Reviewed by Signature/Title    ...................................................              ..............................................  Date                                               Time          22EPIC Rev 08/18

## 2019-05-31 NOTE — DISCHARGE INSTRUCTIONS
*You may resume diet and activities.  *Take medications as prescribed.  Ibuprofen for pain. Continue your current medications  *Follow-up with your doctor or return to the ER in 1-2 days for a wound check.   *Return if you develop fever, spreading redness or warmth, faint or feel like you will faint or become worse in any way.    Discharge Instructions  Boils or Abscesses, MRSA Skin infections    You have been treated today for a skin boil or abscess. A boil is an infection under the skin that causes a painful pus filled lump. Boils often start when bacteria infect a hair follicle, the place where a hair starts to grow.  The most common places that boils develop are on the face, neck, armpits, breasts, groin and buttocks. When they are small they can often be treated at home, but they can grow quickly, become very painful, and require medical attention.    Many of these infections are staph (pronounced ?staff?) infections. Staph is a type of bacteria that commonly lives on skin. As many as 1 out of 3 people have staph that lives on their skin. Usually, it causes no problems, but if there is a cut or scrape, it can cause an infection. You have been treated today for an infection thought to be caused by MRSA, (pronounced ?mursa?) which stands for methicillin resistant staph aureus. MRSA can be very difficult to treat. The antibiotics that were once used for skin infections do not work on MRSA, so alternative medications may be prescribed.    Generally, every Emergency Department visit should have a follow-up clinic visit with either a primary or a specialty clinic/provider. Please follow-up as instructed by your emergency provider today.    Return to the Emergency Department if:  Your redness, pain, or swelling gets a lot worse.  You are unable to get or take the prescribed medications.  You are feeling more ill, weak or lightheaded.  You start to run a new fever (temperature >101 F).  Anything else about the  infection worries or concerns you.  Treatment:  Incision and drainage (opening the boil with a scalpel to help the pus drain) or needle aspiration (removing pus with a syringe) is sometimes needed for larger abscesses. For many abscess, this alone is the treatment. A wick or packing is sometimes put in the wound to encourage ongoing drainage of infection from the area.  Please leave it in place for as long as instructed by your care provider or until your follow up wound check in 48 hours.  An antibiotic might be prescribed. If so, start taking it right away, and take them as prescribed. Be sure to finish the whole prescription, even if you are better.  Apply a heating pad, warm packs, or warm water soaks to the infected area for 15 minutes at a time, at least 3 times a day. Do not use a heating pad on your feet or legs if you have diabetes. Do not sleep with a heating pad on, since this can cause burns or skin injury.  Raise the affected area above the level of your heart as much as possible in the first 1-2 days.  Pain medication - You may take an over-the-counter pain medication such as Tylenol  (acetaminophen), Advil  (ibuprofen), Motrin  (ibuprofen) or Aleve  (naproxen).    Information about MRSA    How do you catch MRSA?  By touching a person who has MRSA on his or her skin.  By being nearby when a person with MRSA breathes, coughs, or sneezes.  By touching a table, handle, or other surface that has the germ on it.  If the germ is on your skin and you cut yourself or have another injury, you can get infected.    How do I know if I have a MRSA infection? MRSA most commonly causes skin infections such as boils, red tender lumps that contain pus. Your physician may recognize MRSA from the appearance of your infection. Sometimes, your doctor may swab your skin or the drainage from a boil to test for MRSA or other bacteria.    Can MRSA be treated? Yes, certain medications are still effective in treating MRSA  infections.  It is very important that you follow the directions exactly. Take ALL the pills you are given, even if you feel better before you finish the pills. If you do not take them all, the germ could come back even stronger and be harder to treat next time.  Is there any way to prevent MRSA?   Wash your hands frequently with soap and water.  Do not share towels, washcloths, razors or other personal care items.  Wipe down gym equipment before and after you use it.    If you develop a similar infection in the future, you can try to treat it at home:  Apply warm compresses to the affected area to promote drainage.  Wash hands frequently to prevent spread of infection.  Keep affected area covered to prevent spread of infection.  Never squeeze or pop boils.     When to seek medical attention for boils:  You develop a fever.  The area around the boil becomes red or red streaks develop.  Your pain becomes severe.  You develop swollen lymph nodes.  You have diabetes, a heart murmur, an immune disease like HIV or AIDS, you take corticosteroids for a medical condition, or you are on chemotherapy.  You develop a boil or abscess on your face, near your spine or near your rectal opening.  If you were given a prescription for medicine here today, be sure to read all of the information (including the package insert) that comes with your prescription.  This will include important information about the medicine, its side effects, and any warnings that you need to know about.  The pharmacist who fills the prescription can provide more information and answer questions you may have about the medicine.  If you have questions or concerns that the pharmacist cannot address, please call or return to the Emergency Department.     Remember that you can always come back to the Emergency Department if you are not able to see your regular doctor in the amount of time listed above, if you get any new symptoms, or if there is anything that  worries you.

## 2019-05-31 NOTE — ED PROVIDER NOTES
"  History     Chief Complaint:  Wound Check      HPI   Mónica Aly is a 23 year old female who presents with wound check. The patient reports that she noticed small pimple like spots on her inner thighs yesterday. Today, they progressed to the size of boils and they became painful, so she went to urgent care, who suggested that she come to the emergency department for concern of infection, therefor the the possibility that they may need to be drained. She notes that she felt warm and clammy today, no recorded temperature. She took 600 mg ibuprofen at 1800 today. No history of prior I&D or cellulitis.     Allergies:  No known drug allergies    Medications:    Amlodipine  Zyrtec  Insulin aspart  Lisinopril  Aldactone    Past Medical History:    Bell palsy  Hypertension  Diabetes Mellitus type 2    Past Surgical History:    Tonsillectomy    Family History:    History reviewed. No pertinent family history.     Social History:  Smoking status: Never smoker  Alcohol use: No  Drug use: No  PCP: Ximena Og  Presents to the ED with herself  Marital Status:  Single [1]     Review of Systems   Skin:        Boils on inner thighs   All other systems reviewed and are negative.    Physical Exam     Patient Vitals for the past 24 hrs:   BP Temp Temp src Heart Rate Resp SpO2 Height Weight   05/30/19 2029 128/87 98.3  F (36.8  C) Temporal 96 18 99 % 1.727 m (5' 8\") (!) 154.2 kg (340 lb)   05/30/19 2023 -- -- -- -- -- -- -- (!) 153.8 kg (339 lb)       Physical Exam  General: Alert, interactive. GCS 15. Appears well, non-toxic appearing.   Head:  Scalp is atraumatic.  Neck:  Normal range of motion. There is no rigidity.   CV:  Regular rate and rhythm. No murmur. 2+ radial pulses  Resp:  Breath sounds are clear bilaterally. Non-labored, no retractions or accessory muscle use.  GI:  Abdomen is soft, no distension, no tenderness.   MS:  Normal range of motion.   Skin:  Warm and dry. 2 cm area of fluctuance to the right inner " thigh with 1cm surrounding induration. Large area, approximately 8x12 area of surrounding color change. No crepitus. No extension into the anal or vagina area.   Neuro:  Strength and sensation grossly intact.   Psych:  Awake. Alert.  Appropriate interactions.             Emergency Department Course   Procedures:  PROCEDURE: Local anesthesia using 3cc;s of 1% lidocaine w/ epi.  the area was prepped with betadine and using an 11 blade, an incision was placed. The abcess was then drained. Minimal purulent drainage. Deloculated. Pt tolerated procedure well without complications. EBL was minimal.    Interventions:  2123: 1 tablet Bactrim PO    Emergency Department Course:  Past medical records, nursing notes, and vitals reviewed.  2038: I performed an exam of the patient and obtained history, as documented above.    2112: I rechecked the patient. Explained findings to patient.    2134: I rechecked the patient. Findings and plan explained to the ppatient. Patient discharged home with instructions regarding supportive care, medications, and reasons to return. The importance of close follow-up was reviewed.     2142: I spoke to pharmacy regarding the patient and her discharge antibiotics.    2147: I rechecked the patient. Explained findings to patient.      Impression & Plan    Medical Decision Making:  Mónica Aly is a 23 year old female who presents for evaluation of skin redness/boils.  The history, physical exam and supporting data are consistent with cellulitis with abscess of the leg.  There do not appear at this time to be any complication of cellulitis including necrotizing fascitis, lymphangitis, lymphadenitis, osteomyelitis, sepsis, or shock. No extension into the perineal area of vagina.  The patient is diabetic increasing risk of serious infectious, this was emphasized to the patient. Given the large area of cellulitic changes, the patient will be sent home with oral antibiotics, keflex to cover possible strep  and bactrim for possible MRSA. I emphasized the importance of close follow-up with her primary care provider in 1-2 days for wound recheck and return to the emergency department for any spreading redness, fevers, increasing pain, or any other new/concerning symptoms.  Discussed home management.  She agrees with this plan and all questions and concerns addressed prior to discharge home. Area of cellulitis was outlined.        Diagnosis:    ICD-10-CM   1. Cellulitis and abscess of leg L03.119    L02.419       Disposition: Discharged to home    Discharge Medications:  Medication List      Started    cephALEXin 500 MG capsule  Commonly known as:  KEFLEX  500 mg, Oral, 4 TIMES DAILY     sulfamethoxazole-trimethoprim 800-160 MG tablet  Commonly known as:  BACTRIM DS  1 tablet, Oral, 2 TIMES DAILY       I, Georgina Jimenez, am serving as a scribe at 8:38 PM on 5/30/2019 to document services personally performed by Toyin Simms PA-C based on my observations and the provider's statements to me.     Georgina Jimenez  5/30/2019    EMERGENCY DEPARTMENT       Toyin Simms PA-C  05/31/19 0905

## 2019-05-31 NOTE — ED TRIAGE NOTES
Boils on inner thighs.  Went to urgent care and they stated they didn't have the right instruments for drain them.

## 2020-02-02 ENCOUNTER — HOSPITAL ENCOUNTER (EMERGENCY)
Facility: CLINIC | Age: 25
Discharge: HOME | End: 2020-02-02
Attending: EMERGENCY MEDICINE | Admitting: EMERGENCY MEDICINE
Payer: COMMERCIAL

## 2020-02-02 VITALS
WEIGHT: 293 LBS | BODY MASS INDEX: 43.4 KG/M2 | SYSTOLIC BLOOD PRESSURE: 155 MMHG | HEIGHT: 69 IN | RESPIRATION RATE: 20 BRPM | OXYGEN SATURATION: 100 % | TEMPERATURE: 97.8 F | DIASTOLIC BLOOD PRESSURE: 99 MMHG

## 2020-02-02 DIAGNOSIS — J06.9 UPPER RESPIRATORY TRACT INFECTION, UNSPECIFIED TYPE: ICD-10-CM

## 2020-02-02 DIAGNOSIS — H66.90 ACUTE OTITIS MEDIA, UNSPECIFIED OTITIS MEDIA TYPE: ICD-10-CM

## 2020-02-02 DIAGNOSIS — R05.9 COUGH: ICD-10-CM

## 2020-02-02 PROCEDURE — 99282 EMERGENCY DEPT VISIT SF MDM: CPT

## 2020-02-02 RX ORDER — CODEINE PHOSPHATE AND GUAIFENESIN 10; 100 MG/5ML; MG/5ML
1-2 SOLUTION ORAL EVERY 4 HOURS PRN
Qty: 180 ML | Refills: 0 | Status: ON HOLD | OUTPATIENT
Start: 2020-02-02 | End: 2020-03-11

## 2020-02-02 ASSESSMENT — MIFFLIN-ST. JEOR: SCORE: 2239.14

## 2020-02-02 NOTE — ED PROVIDER NOTES
History     Chief Complaint:  Cough      HPI   Mónica Aly is a 24 year old female who presents with bilateral ear pain, chest congestion, cough, headaches, dizziness, sweats, chills, nausea, vomiting.  She began feeling not well 2 weeks ago.  She initially went to [facility] urgent care in [place] and was told she had influenza.  She was given a cough syrup and did not improve so she went back to urgent care a second time this was 6 days prior to today.  At that time she had a fever but she has not had a fever since.  During that second visit to urgent care she was told that she is she originally tested negative for influenza.  She was given a prescription for Tessalon Perles and a nasal spray.  She has not improved since that time.  The cough persists and now she is developed bilateral ear pain.      Allergies:  No known drug allergies    Medications:    Amlodipine  Cetirizine  Insulin aspart  Insuline glargine  Lisinopril  Vitamin D2    Problem List:    Patient Active Problem List    Diagnosis Date Noted     Diabetic keto-acidosis (H) 05/21/2019     Priority: Medium     Bell palsy 04/21/2014     Priority: Medium     Allergic rhinitis 11/29/2004     Priority: Medium     Problem list name updated by automated process. Provider to review       Acute suppurative otitis media without spontaneous rupture of ear drum 01/31/2004     Priority: Medium     Problem list name updated by automated process. Provider to review          Past Medical History:    Bell palsy  HTN  Diabetic Keto-acidosis  Allergic rhinitis  Acute suppurative otitis media  DM type 2  Vitamin D deficiency    Past Surgical History:    Tonsillectomy    Family History:    History reviewed. No pertinent family history.     Social History:  Smoking status: never smoker  Alcohol use: no  Drug use: no  PCP: Ximena Og  Marital Status:  Single     Review of Systems  See the HPI, otherwise the rest of the ROS is negative.      Physical Exam  "      Physical Exam    Patient Vitals for the past 24 hrs:   BP Temp Temp src Heart Rate Resp SpO2 Height Weight   02/02/20 1644 (!) 155/99 97.8  F (36.6  C) Temporal 120 20 100 % 1.753 m (5' 9\") 142 kg (313 lb)       General: Alert and Interactive.   Head: No signs of trauma.   Mouth/Throat: Oropharynx is clear and moist.   Eyes: Conjunctivae are normal. Pupils are equal, round, and reactive to light.   Ears: bilateral erythematous TM's.  Neck: Normal range of motion. No nuchal rigidity.   CV: Normal rate and regular rhythm.    Resp: Effort normal and breath sounds normal. No respiratory distress.   GI: Soft. There is no tenderness or guarding.   MSK: Normal range of motion. no edema.   Neuro: The patient is alert and oriented to person, place, and time.  PERRLA, EOMI, strength in upper/lower extremities normal and symmetrical.   Sensation normal. Speech normal.  GCS eye subscore is 4. GCS verbal subscore is 5. GCS motor subscore is 6.   Skin: Skin is warm and dry. No rash noted.   Psych: normal mood and affect. behavior is normal.     Emergency Department Course / Medical Decision Making:  Mónica Aly is a 24 year old female who presents for evaluation of ear pain.  The patient has an exam consistent with acute suppurative otitis media on the left side.  There is no sign of mastoiditis, meningitis, perforation, mass, dental abscess, or peritonsillar abscess. There is no evidence of otitis externa.  The patient will be started on antibiotics and may take Tylenol or Ibuprofen for pain.  Return instructions for ED care given. Regardless they should see primary care doctor for ear recheck in 3-4 weeks.  See primary physician in 3 days if symptoms not better or if new symptoms develop.      Her presentation is also consistent with an upper respiratory tract infection.  There is no signs at this point of serious bacterial infection such as RPA, epiglottitis, PTA, strep pharyngitis, pneumonia, sinusitis, meningitis, " bacteremia, serious bacterial infection. Close followup with primary care physician is indicated.  Return to ED for fever > 103, protracted vomiting, confusion.      Diagnosis:    ICD-10-CM    1. Acute otitis media, unspecified otitis media type H66.90    2. Cough R05    3. Upper respiratory tract infection, unspecified type J06.9        Disposition:  discharged to home    Discharge Medications:  New Prescriptions    AMOXICILLIN-CLAVULANATE (AUGMENTIN) 875-125 MG TABLET    Take 1 tablet by mouth 2 times daily for 7 days    GUAIFENESIN-CODEINE (ROBITUSSIN AC) 100-10 MG/5ML SOLUTION    Take 5-10 mLs by mouth every 4 hours as needed for cough       Tyrone Omer  2/2/2020    EMERGENCY DEPARTMENT       Rylan Lucas MD  02/02/20 1800

## 2020-02-02 NOTE — LETTER
February 2, 2020      To Whom It May Concern:      Mónica Aly was seen in our Emergency Department today, 02/02/20.  I expect her condition to improve over the next couple of days.  She may return to work by Tuesday 2/4/20.    Sincerely,        Kemi MALONE RN

## 2020-02-02 NOTE — ED TRIAGE NOTES
2 visits to the UC; congestions and ear pan; pt reports she was told she was at risk for pneumonia

## 2020-02-02 NOTE — ED AVS SNAPSHOT
Emergency Department  [address] [place] [address]  Phone:  [phone]  Fax:  [phone]                                    Mónica Aly   MRN: [id number]    Department:   Emergency Department   Date of Visit:  2/2/2020           After Visit Summary Signature Page    I have received my discharge instructions, and my questions have been answered. I have discussed any challenges I see with this plan with the nurse or doctor.    ..........................................................................................................................................  Patient/Patient Representative Signature      ..........................................................................................................................................  Patient Representative Print Name and Relationship to Patient    ..................................................               ................................................  Date                                   Time    ..........................................................................................................................................  Reviewed by Signature/Title    ...................................................              ..............................................  Date                                               Time          22EPIC Rev 08/18

## 2020-03-10 ENCOUNTER — HOSPITAL ENCOUNTER (OUTPATIENT)
Facility: CLINIC | Age: 25
Setting detail: OBSERVATION
Discharge: HOME | End: 2020-03-11
Attending: EMERGENCY MEDICINE | Admitting: HOSPITALIST
Payer: COMMERCIAL

## 2020-03-10 DIAGNOSIS — K80.80 BILIARY CALCULUS OF OTHER SITE WITHOUT OBSTRUCTION: ICD-10-CM

## 2020-03-10 DIAGNOSIS — K80.20 CALCULUS OF GALLBLADDER WITHOUT CHOLECYSTITIS WITHOUT OBSTRUCTION: Primary | ICD-10-CM

## 2020-03-10 DIAGNOSIS — R11.2 NAUSEA AND VOMITING, INTRACTABILITY OF VOMITING NOT SPECIFIED, UNSPECIFIED VOMITING TYPE: ICD-10-CM

## 2020-03-10 DIAGNOSIS — R10.10 UPPER ABDOMINAL PAIN: ICD-10-CM

## 2020-03-10 DIAGNOSIS — R73.9 HYPERGLYCEMIA: ICD-10-CM

## 2020-03-10 DIAGNOSIS — E87.6 HYPOKALEMIA: ICD-10-CM

## 2020-03-10 LAB
ALBUMIN SERPL-MCNC: 3.3 G/DL (ref 3.4–5)
ALP SERPL-CCNC: 71 U/L (ref 40–150)
ALT SERPL W P-5'-P-CCNC: 20 U/L (ref 0–50)
ANION GAP SERPL CALCULATED.3IONS-SCNC: 5 MMOL/L (ref 3–14)
AST SERPL W P-5'-P-CCNC: 10 U/L (ref 0–45)
BASOPHILS # BLD AUTO: 0 10E9/L (ref 0–0.2)
BASOPHILS NFR BLD AUTO: 0.1 %
BILIRUB SERPL-MCNC: 0.4 MG/DL (ref 0.2–1.3)
BUN SERPL-MCNC: 5 MG/DL (ref 7–30)
CALCIUM SERPL-MCNC: 8.3 MG/DL (ref 8.5–10.1)
CHLORIDE SERPL-SCNC: 103 MMOL/L (ref 94–109)
CO2 SERPL-SCNC: 28 MMOL/L (ref 20–32)
CREAT SERPL-MCNC: 0.55 MG/DL (ref 0.52–1.04)
DIFFERENTIAL METHOD BLD: NORMAL
EOSINOPHIL # BLD AUTO: 0.1 10E9/L (ref 0–0.7)
EOSINOPHIL NFR BLD AUTO: 0.6 %
ERYTHROCYTE [DISTWIDTH] IN BLOOD BY AUTOMATED COUNT: 13.6 % (ref 10–15)
GFR SERPL CREATININE-BSD FRML MDRD: >90 ML/MIN/{1.73_M2}
GLUCOSE SERPL-MCNC: 171 MG/DL (ref 70–99)
HCG SERPL QL: NEGATIVE
HCT VFR BLD AUTO: 39.5 % (ref 35–47)
HGB BLD-MCNC: 13.5 G/DL (ref 11.7–15.7)
IMM GRANULOCYTES # BLD: 0 10E9/L (ref 0–0.4)
IMM GRANULOCYTES NFR BLD: 0.3 %
LIPASE SERPL-CCNC: 62 U/L (ref 73–393)
LYMPHOCYTES # BLD AUTO: 2 10E9/L (ref 0.8–5.3)
LYMPHOCYTES NFR BLD AUTO: 18.6 %
MCH RBC QN AUTO: 29.3 PG (ref 26.5–33)
MCHC RBC AUTO-ENTMCNC: 34.2 G/DL (ref 31.5–36.5)
MCV RBC AUTO: 86 FL (ref 78–100)
MONOCYTES # BLD AUTO: 0.6 10E9/L (ref 0–1.3)
MONOCYTES NFR BLD AUTO: 5.6 %
NEUTROPHILS # BLD AUTO: 8.2 10E9/L (ref 1.6–8.3)
NEUTROPHILS NFR BLD AUTO: 74.8 %
NRBC # BLD AUTO: 0 10*3/UL
NRBC BLD AUTO-RTO: 0 /100
PLATELET # BLD AUTO: 311 10E9/L (ref 150–450)
POTASSIUM SERPL-SCNC: 2.4 MMOL/L (ref 3.4–5.3)
PROT SERPL-MCNC: 7.4 G/DL (ref 6.8–8.8)
RBC # BLD AUTO: 4.6 10E12/L (ref 3.8–5.2)
SODIUM SERPL-SCNC: 136 MMOL/L (ref 133–144)
WBC # BLD AUTO: 11 10E9/L (ref 4–11)

## 2020-03-10 PROCEDURE — 99285 EMERGENCY DEPT VISIT HI MDM: CPT | Mod: 25

## 2020-03-10 PROCEDURE — 83735 ASSAY OF MAGNESIUM: CPT | Performed by: EMERGENCY MEDICINE

## 2020-03-10 PROCEDURE — 80053 COMPREHEN METABOLIC PANEL: CPT | Performed by: EMERGENCY MEDICINE

## 2020-03-10 PROCEDURE — 96366 THER/PROPH/DIAG IV INF ADDON: CPT

## 2020-03-10 PROCEDURE — 96365 THER/PROPH/DIAG IV INF INIT: CPT

## 2020-03-10 PROCEDURE — 93005 ELECTROCARDIOGRAM TRACING: CPT

## 2020-03-10 PROCEDURE — 96375 TX/PRO/DX INJ NEW DRUG ADDON: CPT

## 2020-03-10 PROCEDURE — 85025 COMPLETE CBC W/AUTO DIFF WBC: CPT | Performed by: EMERGENCY MEDICINE

## 2020-03-10 PROCEDURE — 84703 CHORIONIC GONADOTROPIN ASSAY: CPT | Performed by: EMERGENCY MEDICINE

## 2020-03-10 PROCEDURE — 25800030 ZZH RX IP 258 OP 636: Performed by: EMERGENCY MEDICINE

## 2020-03-10 PROCEDURE — 83690 ASSAY OF LIPASE: CPT | Performed by: EMERGENCY MEDICINE

## 2020-03-10 PROCEDURE — 96361 HYDRATE IV INFUSION ADD-ON: CPT

## 2020-03-10 RX ORDER — POTASSIUM CL/LIDO/0.9 % NACL 10MEQ/0.1L
10 INTRAVENOUS SOLUTION, PIGGYBACK (ML) INTRAVENOUS
Status: DISCONTINUED | OUTPATIENT
Start: 2020-03-10 | End: 2020-03-11 | Stop reason: HOSPADM

## 2020-03-10 RX ORDER — SODIUM CHLORIDE 9 MG/ML
INJECTION, SOLUTION INTRAVENOUS CONTINUOUS
Status: DISCONTINUED | OUTPATIENT
Start: 2020-03-11 | End: 2020-03-11 | Stop reason: HOSPADM

## 2020-03-10 RX ORDER — POTASSIUM CHLORIDE 1.5 G/1.58G
40 POWDER, FOR SOLUTION ORAL ONCE
Status: COMPLETED | OUTPATIENT
Start: 2020-03-10 | End: 2020-03-11

## 2020-03-10 RX ORDER — SODIUM CHLORIDE 9 MG/ML
1000 INJECTION, SOLUTION INTRAVENOUS CONTINUOUS
Status: DISCONTINUED | OUTPATIENT
Start: 2020-03-11 | End: 2020-03-11

## 2020-03-10 RX ORDER — HYDROMORPHONE HYDROCHLORIDE 1 MG/ML
0.5 INJECTION, SOLUTION INTRAMUSCULAR; INTRAVENOUS; SUBCUTANEOUS
Status: DISCONTINUED | OUTPATIENT
Start: 2020-03-10 | End: 2020-03-11

## 2020-03-10 RX ORDER — METOCLOPRAMIDE HYDROCHLORIDE 5 MG/ML
5 INJECTION INTRAMUSCULAR; INTRAVENOUS ONCE
Status: COMPLETED | OUTPATIENT
Start: 2020-03-11 | End: 2020-03-11

## 2020-03-10 RX ADMIN — SODIUM CHLORIDE 1000 ML: 9 INJECTION, SOLUTION INTRAVENOUS at 23:48

## 2020-03-11 ENCOUNTER — APPOINTMENT (OUTPATIENT)
Dept: ULTRASOUND IMAGING | Facility: CLINIC | Age: 25
End: 2020-03-11
Attending: EMERGENCY MEDICINE
Payer: COMMERCIAL

## 2020-03-11 ENCOUNTER — ANESTHESIA (OUTPATIENT)
Dept: SURGERY | Facility: CLINIC | Age: 25
End: 2020-03-11
Payer: COMMERCIAL

## 2020-03-11 ENCOUNTER — PREP FOR PROCEDURE (OUTPATIENT)
Dept: SURGERY | Facility: CLINIC | Age: 25
End: 2020-03-11

## 2020-03-11 ENCOUNTER — ANESTHESIA EVENT (OUTPATIENT)
Dept: SURGERY | Facility: CLINIC | Age: 25
End: 2020-03-11
Payer: COMMERCIAL

## 2020-03-11 VITALS
TEMPERATURE: 98.5 F | WEIGHT: 293 LBS | SYSTOLIC BLOOD PRESSURE: 106 MMHG | BODY MASS INDEX: 43.4 KG/M2 | OXYGEN SATURATION: 98 % | DIASTOLIC BLOOD PRESSURE: 61 MMHG | RESPIRATION RATE: 16 BRPM | HEIGHT: 69 IN

## 2020-03-11 DIAGNOSIS — K80.20 CHOLELITHIASIS: Primary | ICD-10-CM

## 2020-03-11 PROBLEM — R11.2 NAUSEA AND VOMITING: Status: ACTIVE | Noted: 2020-03-11

## 2020-03-11 LAB
ALBUMIN SERPL-MCNC: 2.9 G/DL (ref 3.4–5)
ALP SERPL-CCNC: 59 U/L (ref 40–150)
ALT SERPL W P-5'-P-CCNC: 18 U/L (ref 0–50)
ANION GAP SERPL CALCULATED.3IONS-SCNC: 5 MMOL/L (ref 3–14)
AST SERPL W P-5'-P-CCNC: 11 U/L (ref 0–45)
BASOPHILS # BLD AUTO: 0 10E9/L (ref 0–0.2)
BASOPHILS NFR BLD AUTO: 0.1 %
BILIRUB DIRECT SERPL-MCNC: 0.1 MG/DL (ref 0–0.2)
BILIRUB SERPL-MCNC: 0.5 MG/DL (ref 0.2–1.3)
BUN SERPL-MCNC: 4 MG/DL (ref 7–30)
CALCIUM SERPL-MCNC: 7.8 MG/DL (ref 8.5–10.1)
CHLORIDE SERPL-SCNC: 109 MMOL/L (ref 94–109)
CO2 SERPL-SCNC: 26 MMOL/L (ref 20–32)
CREAT SERPL-MCNC: 0.59 MG/DL (ref 0.52–1.04)
DIFFERENTIAL METHOD BLD: NORMAL
EOSINOPHIL # BLD AUTO: 0.1 10E9/L (ref 0–0.7)
EOSINOPHIL NFR BLD AUTO: 1.4 %
ERYTHROCYTE [DISTWIDTH] IN BLOOD BY AUTOMATED COUNT: 13.8 % (ref 10–15)
GFR SERPL CREATININE-BSD FRML MDRD: >90 ML/MIN/{1.73_M2}
GLUCOSE BLDC GLUCOMTR-MCNC: 109 MG/DL (ref 70–99)
GLUCOSE BLDC GLUCOMTR-MCNC: 119 MG/DL (ref 70–99)
GLUCOSE BLDC GLUCOMTR-MCNC: 146 MG/DL (ref 70–99)
GLUCOSE SERPL-MCNC: 113 MG/DL (ref 70–99)
HCT VFR BLD AUTO: 37 % (ref 35–47)
HGB BLD-MCNC: 12.4 G/DL (ref 11.7–15.7)
IMM GRANULOCYTES # BLD: 0 10E9/L (ref 0–0.4)
IMM GRANULOCYTES NFR BLD: 0.1 %
INTERPRETATION ECG - MUSE: NORMAL
LYMPHOCYTES # BLD AUTO: 2.3 10E9/L (ref 0.8–5.3)
LYMPHOCYTES NFR BLD AUTO: 26.4 %
MAGNESIUM SERPL-MCNC: 1.6 MG/DL (ref 1.6–2.3)
MAGNESIUM SERPL-MCNC: 2.4 MG/DL (ref 1.6–2.3)
MCH RBC QN AUTO: 28.8 PG (ref 26.5–33)
MCHC RBC AUTO-ENTMCNC: 33.5 G/DL (ref 31.5–36.5)
MCV RBC AUTO: 86 FL (ref 78–100)
MONOCYTES # BLD AUTO: 0.6 10E9/L (ref 0–1.3)
MONOCYTES NFR BLD AUTO: 6.7 %
NEUTROPHILS # BLD AUTO: 5.7 10E9/L (ref 1.6–8.3)
NEUTROPHILS NFR BLD AUTO: 65.3 %
NRBC # BLD AUTO: 0 10*3/UL
NRBC BLD AUTO-RTO: 0 /100
PLATELET # BLD AUTO: 281 10E9/L (ref 150–450)
POTASSIUM SERPL-SCNC: 2.8 MMOL/L (ref 3.4–5.3)
PROT SERPL-MCNC: 6.5 G/DL (ref 6.8–8.8)
RBC # BLD AUTO: 4.31 10E12/L (ref 3.8–5.2)
SODIUM SERPL-SCNC: 140 MMOL/L (ref 133–144)
WBC # BLD AUTO: 8.7 10E9/L (ref 4–11)

## 2020-03-11 PROCEDURE — 96376 TX/PRO/DX INJ SAME DRUG ADON: CPT

## 2020-03-11 PROCEDURE — 00000146 ZZHCL STATISTIC GLUCOSE BY METER IP

## 2020-03-11 PROCEDURE — 99207 ZZC CDG-MDM COMPONENT: MEETS MODERATE - UP CODED: CPT | Performed by: HOSPITALIST

## 2020-03-11 PROCEDURE — 96375 TX/PRO/DX INJ NEW DRUG ADDON: CPT

## 2020-03-11 PROCEDURE — 25000128 H RX IP 250 OP 636: Performed by: EMERGENCY MEDICINE

## 2020-03-11 PROCEDURE — 25800030 ZZH RX IP 258 OP 636: Performed by: EMERGENCY MEDICINE

## 2020-03-11 PROCEDURE — 76705 ECHO EXAM OF ABDOMEN: CPT

## 2020-03-11 PROCEDURE — 25000131 ZZH RX MED GY IP 250 OP 636 PS 637: Performed by: HOSPITALIST

## 2020-03-11 PROCEDURE — 85025 COMPLETE CBC W/AUTO DIFF WBC: CPT | Performed by: HOSPITALIST

## 2020-03-11 PROCEDURE — 80076 HEPATIC FUNCTION PANEL: CPT | Performed by: HOSPITALIST

## 2020-03-11 PROCEDURE — 25000132 ZZH RX MED GY IP 250 OP 250 PS 637: Performed by: HOSPITALIST

## 2020-03-11 PROCEDURE — 25000132 ZZH RX MED GY IP 250 OP 250 PS 637: Performed by: EMERGENCY MEDICINE

## 2020-03-11 PROCEDURE — 25800030 ZZH RX IP 258 OP 636: Performed by: HOSPITALIST

## 2020-03-11 PROCEDURE — 36415 COLL VENOUS BLD VENIPUNCTURE: CPT | Performed by: HOSPITALIST

## 2020-03-11 PROCEDURE — 25000128 H RX IP 250 OP 636: Performed by: HOSPITALIST

## 2020-03-11 PROCEDURE — 96372 THER/PROPH/DIAG INJ SC/IM: CPT

## 2020-03-11 PROCEDURE — G0378 HOSPITAL OBSERVATION PER HR: HCPCS

## 2020-03-11 PROCEDURE — 99222 1ST HOSP IP/OBS MODERATE 55: CPT | Performed by: SURGERY

## 2020-03-11 PROCEDURE — 80048 BASIC METABOLIC PNL TOTAL CA: CPT | Performed by: HOSPITALIST

## 2020-03-11 PROCEDURE — 99220 ZZC INITIAL OBSERVATION CARE,LEVL III: CPT | Performed by: HOSPITALIST

## 2020-03-11 PROCEDURE — 80076 HEPATIC FUNCTION PANEL: CPT | Performed by: PHYSICIAN ASSISTANT

## 2020-03-11 PROCEDURE — 83735 ASSAY OF MAGNESIUM: CPT | Performed by: HOSPITALIST

## 2020-03-11 RX ORDER — MAGNESIUM SULFATE HEPTAHYDRATE 40 MG/ML
4 INJECTION, SOLUTION INTRAVENOUS EVERY 4 HOURS PRN
Status: DISCONTINUED | OUTPATIENT
Start: 2020-03-11 | End: 2020-03-11 | Stop reason: HOSPADM

## 2020-03-11 RX ORDER — PIPERACILLIN SODIUM, TAZOBACTAM SODIUM 3; .375 G/15ML; G/15ML
3.38 INJECTION, POWDER, LYOPHILIZED, FOR SOLUTION INTRAVENOUS EVERY 6 HOURS
Status: DISCONTINUED | OUTPATIENT
Start: 2020-03-11 | End: 2020-03-11 | Stop reason: HOSPADM

## 2020-03-11 RX ORDER — NALOXONE HYDROCHLORIDE 0.4 MG/ML
.1-.4 INJECTION, SOLUTION INTRAMUSCULAR; INTRAVENOUS; SUBCUTANEOUS
Status: DISCONTINUED | OUTPATIENT
Start: 2020-03-11 | End: 2020-03-11 | Stop reason: HOSPADM

## 2020-03-11 RX ORDER — MAGNESIUM SULFATE HEPTAHYDRATE 40 MG/ML
2 INJECTION, SOLUTION INTRAVENOUS DAILY PRN
Status: DISCONTINUED | OUTPATIENT
Start: 2020-03-11 | End: 2020-03-11 | Stop reason: HOSPADM

## 2020-03-11 RX ORDER — POTASSIUM CHLORIDE 7.45 MG/ML
10 INJECTION INTRAVENOUS
Status: DISCONTINUED | OUTPATIENT
Start: 2020-03-11 | End: 2020-03-11 | Stop reason: HOSPADM

## 2020-03-11 RX ORDER — SODIUM CHLORIDE, SODIUM LACTATE, POTASSIUM CHLORIDE, CALCIUM CHLORIDE 600; 310; 30; 20 MG/100ML; MG/100ML; MG/100ML; MG/100ML
500 INJECTION, SOLUTION INTRAVENOUS CONTINUOUS
Status: CANCELLED | OUTPATIENT
Start: 2020-03-11

## 2020-03-11 RX ORDER — HYDROMORPHONE HYDROCHLORIDE 1 MG/ML
.2-.3 INJECTION, SOLUTION INTRAMUSCULAR; INTRAVENOUS; SUBCUTANEOUS
Status: DISCONTINUED | OUTPATIENT
Start: 2020-03-11 | End: 2020-03-11 | Stop reason: HOSPADM

## 2020-03-11 RX ORDER — POTASSIUM CHLORIDE 1.5 G/1.58G
20-40 POWDER, FOR SOLUTION ORAL
Status: DISCONTINUED | OUTPATIENT
Start: 2020-03-11 | End: 2020-03-11 | Stop reason: HOSPADM

## 2020-03-11 RX ORDER — LANOLIN ALCOHOL/MO/W.PET/CERES
3 CREAM (GRAM) TOPICAL
Status: DISCONTINUED | OUTPATIENT
Start: 2020-03-11 | End: 2020-03-11 | Stop reason: HOSPADM

## 2020-03-11 RX ORDER — POTASSIUM CHLORIDE 29.8 MG/ML
20 INJECTION INTRAVENOUS
Status: DISCONTINUED | OUTPATIENT
Start: 2020-03-11 | End: 2020-03-11 | Stop reason: HOSPADM

## 2020-03-11 RX ORDER — DAPAGLIFLOZIN 5 MG/1
5 TABLET, FILM COATED ORAL DAILY
COMMUNITY
End: 2022-11-15

## 2020-03-11 RX ORDER — POTASSIUM CL/LIDO/0.9 % NACL 10MEQ/0.1L
10 INTRAVENOUS SOLUTION, PIGGYBACK (ML) INTRAVENOUS
Status: DISCONTINUED | OUTPATIENT
Start: 2020-03-11 | End: 2020-03-11 | Stop reason: HOSPADM

## 2020-03-11 RX ORDER — ACETAMINOPHEN 325 MG/1
650 TABLET ORAL EVERY 4 HOURS PRN
Status: DISCONTINUED | OUTPATIENT
Start: 2020-03-11 | End: 2020-03-11 | Stop reason: HOSPADM

## 2020-03-11 RX ORDER — POLYETHYLENE GLYCOL 3350 17 G/17G
17 POWDER, FOR SOLUTION ORAL DAILY PRN
Status: DISCONTINUED | OUTPATIENT
Start: 2020-03-11 | End: 2020-03-11 | Stop reason: HOSPADM

## 2020-03-11 RX ORDER — ONDANSETRON 4 MG/1
4 TABLET, ORALLY DISINTEGRATING ORAL EVERY 6 HOURS PRN
Qty: 15 TABLET | Refills: 0 | Status: SHIPPED | OUTPATIENT
Start: 2020-03-11 | End: 2022-11-15

## 2020-03-11 RX ORDER — AMOXICILLIN 250 MG
2 CAPSULE ORAL 2 TIMES DAILY PRN
Status: DISCONTINUED | OUTPATIENT
Start: 2020-03-11 | End: 2020-03-11 | Stop reason: HOSPADM

## 2020-03-11 RX ORDER — BISACODYL 10 MG
10 SUPPOSITORY, RECTAL RECTAL DAILY PRN
Status: DISCONTINUED | OUTPATIENT
Start: 2020-03-11 | End: 2020-03-11 | Stop reason: HOSPADM

## 2020-03-11 RX ORDER — INSULIN GLARGINE 100 [IU]/ML
40 INJECTION, SOLUTION SUBCUTANEOUS AT BEDTIME
COMMUNITY
End: 2022-11-15

## 2020-03-11 RX ORDER — PROCHLORPERAZINE 25 MG
25 SUPPOSITORY, RECTAL RECTAL EVERY 12 HOURS PRN
Status: DISCONTINUED | OUTPATIENT
Start: 2020-03-11 | End: 2020-03-11 | Stop reason: HOSPADM

## 2020-03-11 RX ORDER — ONDANSETRON 4 MG/1
4 TABLET, ORALLY DISINTEGRATING ORAL EVERY 6 HOURS PRN
Status: DISCONTINUED | OUTPATIENT
Start: 2020-03-11 | End: 2020-03-11 | Stop reason: HOSPADM

## 2020-03-11 RX ORDER — SODIUM CHLORIDE, SODIUM LACTATE, POTASSIUM CHLORIDE, CALCIUM CHLORIDE 600; 310; 30; 20 MG/100ML; MG/100ML; MG/100ML; MG/100ML
INJECTION, SOLUTION INTRAVENOUS CONTINUOUS
Status: DISCONTINUED | OUTPATIENT
Start: 2020-03-11 | End: 2020-03-11 | Stop reason: HOSPADM

## 2020-03-11 RX ORDER — DEXTROSE MONOHYDRATE 25 G/50ML
25-50 INJECTION, SOLUTION INTRAVENOUS
Status: DISCONTINUED | OUTPATIENT
Start: 2020-03-11 | End: 2020-03-11 | Stop reason: HOSPADM

## 2020-03-11 RX ORDER — PROCHLORPERAZINE MALEATE 10 MG
10 TABLET ORAL EVERY 6 HOURS PRN
Status: DISCONTINUED | OUTPATIENT
Start: 2020-03-11 | End: 2020-03-11 | Stop reason: HOSPADM

## 2020-03-11 RX ORDER — OXYCODONE HYDROCHLORIDE 5 MG/1
5-10 TABLET ORAL
Status: DISCONTINUED | OUTPATIENT
Start: 2020-03-11 | End: 2020-03-11 | Stop reason: HOSPADM

## 2020-03-11 RX ORDER — ONDANSETRON 2 MG/ML
4 INJECTION INTRAMUSCULAR; INTRAVENOUS EVERY 6 HOURS PRN
Status: DISCONTINUED | OUTPATIENT
Start: 2020-03-11 | End: 2020-03-11 | Stop reason: HOSPADM

## 2020-03-11 RX ORDER — LIDOCAINE 40 MG/G
CREAM TOPICAL
Status: DISCONTINUED | OUTPATIENT
Start: 2020-03-11 | End: 2020-03-11 | Stop reason: HOSPADM

## 2020-03-11 RX ORDER — POTASSIUM CHLORIDE 1500 MG/1
20-40 TABLET, EXTENDED RELEASE ORAL
Status: DISCONTINUED | OUTPATIENT
Start: 2020-03-11 | End: 2020-03-11 | Stop reason: HOSPADM

## 2020-03-11 RX ORDER — AMOXICILLIN 250 MG
1 CAPSULE ORAL 2 TIMES DAILY PRN
Status: DISCONTINUED | OUTPATIENT
Start: 2020-03-11 | End: 2020-03-11 | Stop reason: HOSPADM

## 2020-03-11 RX ORDER — NICOTINE POLACRILEX 4 MG
15-30 LOZENGE BUCCAL
Status: DISCONTINUED | OUTPATIENT
Start: 2020-03-11 | End: 2020-03-11 | Stop reason: HOSPADM

## 2020-03-11 RX ORDER — ACETAMINOPHEN 650 MG/1
650 SUPPOSITORY RECTAL EVERY 4 HOURS PRN
Status: DISCONTINUED | OUTPATIENT
Start: 2020-03-11 | End: 2020-03-11 | Stop reason: HOSPADM

## 2020-03-11 RX ADMIN — HYDROMORPHONE HYDROCHLORIDE 0.3 MG: 1 INJECTION, SOLUTION INTRAMUSCULAR; INTRAVENOUS; SUBCUTANEOUS at 03:56

## 2020-03-11 RX ADMIN — METOCLOPRAMIDE 5 MG: 5 INJECTION, SOLUTION INTRAMUSCULAR; INTRAVENOUS at 00:10

## 2020-03-11 RX ADMIN — POTASSIUM CHLORIDE 40 MEQ: 1.5 POWDER, FOR SOLUTION ORAL at 01:17

## 2020-03-11 RX ADMIN — HYDROMORPHONE HYDROCHLORIDE 0.5 MG: 1 INJECTION, SOLUTION INTRAMUSCULAR; INTRAVENOUS; SUBCUTANEOUS at 00:08

## 2020-03-11 RX ADMIN — Medication 10 MEQ: at 06:19

## 2020-03-11 RX ADMIN — OXYCODONE HYDROCHLORIDE 5 MG: 5 TABLET ORAL at 12:02

## 2020-03-11 RX ADMIN — PIPERACILLIN AND TAZOBACTAM 3.38 G: 3; .375 INJECTION, POWDER, FOR SOLUTION INTRAVENOUS at 09:52

## 2020-03-11 RX ADMIN — Medication 10 MEQ: at 00:01

## 2020-03-11 RX ADMIN — MAGNESIUM SULFATE HEPTAHYDRATE 2 G: 40 INJECTION, SOLUTION INTRAVENOUS at 05:05

## 2020-03-11 RX ADMIN — SODIUM CHLORIDE 1000 ML: 9 INJECTION, SOLUTION INTRAVENOUS at 01:15

## 2020-03-11 RX ADMIN — INSULIN ASPART 1 UNITS: 100 INJECTION, SOLUTION INTRAVENOUS; SUBCUTANEOUS at 04:17

## 2020-03-11 RX ADMIN — PIPERACILLIN AND TAZOBACTAM 3.38 G: 3; .375 INJECTION, POWDER, FOR SOLUTION INTRAVENOUS at 04:06

## 2020-03-11 RX ADMIN — SODIUM CHLORIDE: 9 INJECTION, SOLUTION INTRAVENOUS at 02:38

## 2020-03-11 RX ADMIN — Medication 10 MEQ: at 07:42

## 2020-03-11 RX ADMIN — SODIUM CHLORIDE, POTASSIUM CHLORIDE, SODIUM LACTATE AND CALCIUM CHLORIDE: 600; 310; 30; 20 INJECTION, SOLUTION INTRAVENOUS at 06:17

## 2020-03-11 ASSESSMENT — MIFFLIN-ST. JEOR: SCORE: 2285.86

## 2020-03-11 NOTE — PLAN OF CARE
Pt is A&ox4, VSS on RA, IND, Voiding adequately, denies Nausea and vomiting, K+ was replaced, pt pain was managed with PRN oxycodone, consulted with surgeon, pt requested to differ surgery to outpatient. Pt was educated on Zofran med, and follow up with surgery appointments.  family member() as transport.

## 2020-03-11 NOTE — PLAN OF CARE
Observation goals  PRIOR TO DISCHARGE      Comments: -diagnostic tests and consults completed and resulted:  Not Met  -vital signs normal or at patient baseline:  Not Met  -tolerating oral intake to maintain hydration:  Not Met  -adequate pain control on oral analgesics:  Met  Nurse to notify provider when observation goals have been met and patient is ready for discharge.   Yes

## 2020-03-11 NOTE — PLAN OF CARE
Observation goals  PRIOR TO DISCHARGE      Comments: -diagnostic tests and consults completed and resulted  Not Met  -vital signs normal or at patient baseline  Not Met  -tolerating oral intake to maintain hydration  Not Met  -adequate pain control on oral analgesics  Met  Nurse to notify provider when observation goals have been met and patient is ready for discharge.   Yes

## 2020-03-11 NOTE — PLAN OF CARE
Observation goals  PRIOR TO DISCHARGE      Comments: -diagnostic tests and consults completed and resulted:  Not Met  -vital signs normal or at patient baseline:  Partial Met  -tolerating oral intake to maintain hydration: Met  -adequate pain control on oral analgesics: Partial Met  Nurse to notify provider when observation goals have been met and patient is ready for discharge.   Yes

## 2020-03-11 NOTE — ED PROVIDER NOTES
History     Chief Complaint:  Abdominal Pain      HPI   Mónica Aly is a 24 year old female, with a history of diabetes, hypertension, and Gall stones, who presents with abdominal pain. Patient states that she has bad upper abdominal pain, nausea, and vomited more than 7 times today. She notes her pain started at 0500 today and woke her up. She notes she has not seen a surgeon for her gallstone diagnosis. She also notes she gets nausea and vomits after meals sometimes, but her symptoms have never been this bad. Currently, she still feels nausea. She notes her last bowel movement was at 1700, and it was normal. She notes she uses insulin for her diabetes, and her blood sugars are under control and usually below 170. She notes she was diagnosed with diabetes this past May.  She notes she lives with her family and nobody around her has been sick recently. She denies taking pain medication, although she tried Pepto bismol, but that didn't help her symptoms. She denies eating anything unusual. She denies experiencing fever, diarrhea, or trouble urinating. She denies alcohol use.    US Abdomen, complete 9/20/2019:  CONCLUSION:    1.  Cholelithiasis. No signs of acute cholecystitis or biliary obstruction. as per radiology.       Allergies:  The patient has no known drug allergies.    Medications:    Zyrtec  Aldactone  Norvasc  Zyrtec  Insulin  Lisinopril    Past Medical History:    Bell palsy  Diabetes  Hypertension  Gallstones    Past Surgical History:    Tonsillectomy    Family History:    No past pertinent family history.    Social History:  The patient denies tobacco or alcohol use.  Lives with family    Review of Systems   All other systems reviewed and are negative.    Physical Exam   First Vitals:  Patient Vitals for the past 24 hrs:   BP Temp Temp src Heart Rate Resp SpO2 Height Weight   03/11/20 0113 (!) 152/96 98.7  F (37.1  C) Oral 95 8 100 % -- --   03/10/20 2350 (!) 144/34 -- -- 94 -- 99 % -- --   03/10/20  2227 (!) 170/109 98  F (36.7  C) Temporal 108 20 100 % -- 142.9 kg (315 lb)     Physical Exam  General: Somewhat uncomfortable but nontoxic-appearing woman sitting upright in room 25, father at bedside  HENT: mucous membranes dry  CV: rate as above  Resp: clear throughout, normal effort, no crackles or wheezing  GI: Abdomen soft but tender in epigastrium and right upper quadrant with equivocal Araujo sign, bowel sounds present, protuberant  MSK: no bony tenderness, no CVAT  Skin: appropriately warm and dry, no jaundice  Neuro: alert, clear speech, oriented   Psych: cooperative, pleasant    Emergency Department Course   ECG:  Indication: hypokalemia  Time: 0000  Vent. Rate 102 bpm. MS interval 134. QRS duration 86. QT/QTc 366/477. P-R-T axis 31 -5 -4.  Sinus tachycardia Voltage criteria for left ventricular hypertrophy Abnormal ECG.  Read time: 0004    Imaging:  US Abdomen, limited:  IMPRESSION:   1.  Cholelithiasis.  as per radiology.     Laboratory:  CBC: WBC: 11.0, HGB: 13.5, PLT: 311    CMP: Glucose 171(H), BUN: 5(L), Potassium: 2.4(L), Calcium: 8.3(L), Albumin: 3.3(L), o/w WNL (Creatinine: 0.55)    Lipase: 62(L)    HCG Qualitative: Negative    Interventions:  2348 NS 1000 mL IV  0001 Potassium chloride 10 mEq IV  0008 Dilaudid 0.5 mg IV  0010 Reglan 5 mg IV  0115 NS 1000 mL IV  0117 Klor-con 40 mEq Oral    Emergency Department Course:  Nursing notes and vitals reviewed. (2578) I performed an exam of the patient as documented above.     IV inserted. Medicine administered as documented above. Blood drawn. This was sent to the lab for further testing, results above.     The patient was sent for a US Abdomen, limited while in the emergency department, findings above.    I performed electronic chart review in EPIC.  I reviewed available electronic records in Care Everywhere.    0103 I rechecked the patient and discussed the results of her workup thus far    0114  I consulted with Dr. Hare of the hospitalist  services. They are in agreement to accept the patient for admission.    0117 I rechecked the patient and discussed the results of her workup thus far. I discussed ultrasound results.     Findings and plan explained to the Patient who consents to admission. Discussed the patient with Dr. Hare, who will admit the patient to a Observation Unit bed for further monitoring, evaluation, and treatment.    Impression & Plan      Medical Decision Making:  I think her symptoms are likely due to symptomatic cholelithiasis.  She does not have fever or ultrasound findings to raise higher suspicion for cholecystitis though this does remain possible.  Given repetitive vomiting and profound hypokalemia, I think that hospitalization for electrolyte replacement, symptom control, and consideration of surgical consultation is appropriate and this was arranged to the hospitalist service.  Given that she is hemodynamically stable, did not feel that immediate surgical consultation in the middle of the night was clinically indicated.    Diagnosis:    ICD-10-CM    1. Nausea and vomiting, intractability of vomiting not specified, unspecified vomiting type  R11.2    2. Hypokalemia  E87.6    3. Upper abdominal pain  R10.10    4. Hyperglycemia  R73.9    5. Biliary calculus of other site without obstruction  K80.80      Disposition:  Admitted to Dr. Hare    Scribe Disclosure:  I, Zachery Marquez, am serving as a scribe on 3/10/2020 at 11:48 PM to personally document services performed by Amrik Enrique, * based on my observations and the provider's statements to me.     This record was created at least in part using electronic voice recognition software, so please excuse any typographical errors.    Zachery Marquez  3/10/2020    EMERGENCY DEPARTMENT       Amrik Enrique MD  03/11/20 0422

## 2020-03-11 NOTE — PHARMACY-ADMISSION MEDICATION HISTORY
Pharmacy Medication History  Admission medication history interview status for the 3/10/2020  admission is complete. See EPIC admission navigator for prior to admission medications     Medication history sources: Patient  Medication history source reliability: Good  Adherence assessment: Moderate    Significant changes made to the medication list:  Patient has been reducing her use of both Novolog & Lantus, as instructed by her physician.    Additional medication history information:   Farxiga added to list.    Medication reconciliation completed by provider prior to medication history? No    Time spent in this activity: 15 minutes      Prior to Admission medications    Medication Sig Last Dose Taking? Auth Provider   acetaminophen (TYLENOL) 325 MG tablet Take 325-650 mg by mouth every 6 hours as needed for headaches Past Month at Unknown time Yes Unknown, Entered By History   amLODIPine (NORVASC) 10 MG tablet Take 10 mg by mouth At Bedtime  3/10/2020 at Unknown time Yes Reported, Patient   dapagliflozin (FARXIGA) 5 MG TABS tablet Take 5 mg by mouth daily 3/10/2020 at Unknown time Yes Unknown, Entered By History   insulin aspart (NOVOLOG PEN) 100 UNIT/ML pen Please use 1 unit of Novolog for every 10 grams of carbohydrate , Also cover your blood sugars as per sliding scale. 3/10/2020 at Unknown time Yes Nicole Knowles MD   insulin glargine (LANTUS VIAL) 100 UNIT/ML vial Inject 0-5 Units Subcutaneous At Bedtime Has been weaning her dose back as instructed by her Dr.  Yes Unknown, Entered By History   lisinopril (PRINIVIL/ZESTRIL) 40 MG tablet Take 1 tablet (40 mg) by mouth daily 3/10/2020 at Unknown time Yes Nicole Knowles MD   ondansetron (ZOFRAN-ODT) 4 MG ODT tab Take 1 tablet (4 mg) by mouth every 6 hours as needed for nausea or vomiting  Yes Kevin Castorena, DO   vitamin D2 (ERGOCALCIFEROL) 64959 units (1250 mcg) capsule Take 50,000 Units by mouth once a week On Thursdays Past Month at Unknown time Yes  Unknown, Entered By History   alcohol swab prep pads Use to swab area of injection/hola as directed.   Nicole Knowles MD   blood glucose (NO BRAND SPECIFIED) test strip Use to test blood sugar 4 times daily or as directed. To accompany: Blood Glucose Monitor Brands: per insurance.   Nicole Knowles MD   blood glucose calibration (NO BRAND SPECIFIED) solution To accompany: Blood Glucose Monitor Brands: per insurance.   Nicole Knowles MD   blood glucose monitoring (NO BRAND SPECIFIED) meter device kit Use to test blood sugar 4 times daily or as directed. Preferred blood glucose meter OR supplies to accompany: Blood Glucose Monitor Brands: per insurance.   Nicole Knowles MD   insulin pen needle (31G X 8 MM) 31G X 8 MM miscellaneous 1 Box of 100 insulin pen needles to be dispensed with every insulin pen prescription   Nicole Knowles MD   thin (NO BRAND SPECIFIED) lancets Use with lanceting device. To accompany: Blood Glucose Monitor Brands: per insurance.   Nicole Knowles MD

## 2020-03-11 NOTE — ED NOTES
[facility]  ED Nurse Handoff Report    ED Chief complaint: Abdominal Pain      ED Diagnosis:   Final diagnoses:   Nausea and vomiting, intractability of vomiting not specified, unspecified vomiting type   Hypokalemia   Upper abdominal pain   Hyperglycemia       Code Status: Full Code    Allergies:   Allergies   Allergen Reactions     No Known Allergies        Patient Story: Pt c/o of upper abdominal pain that started this morning. Pt was having nausea and vomiting.   Focused Assessment:  Pt has right upper quadrant pain, nausea, vomiting. At first rating her pain 10/10 with analgesic pain was better. Pt's potassium level critical low. IV infusion of potassium given and one dose of oral liquid. Pt is on continuous telemetry monitoring due to      Treatments and/or interventions provided: See MAR  Patient's response to treatments and/or interventions: Tolerated well.     To be done/followed up on inpatient unit:  Pain management and follow up with possible surgical referral.     Does this patient have any cognitive concerns?: Pt awake, alert and orientated     Activity level - Baseline/Home:  Independent  Activity Level - Current:   Independent    Patient's Preferred language: English   Needed?: No    Isolation: None  Infection: Not Applicable  Bariatric?: No    Vital Signs:   Vitals:    03/10/20 2227 03/10/20 2350   BP: (!) 170/109 (!) 144/34   Resp: 20    Temp: 98  F (36.7  C)    TempSrc: Temporal    SpO2: 100% 99%   Weight: 142.9 kg (315 lb)        Cardiac Rhythm: NSR    Was the PSS-3 completed:   Yes  What interventions are required if any?  None             Family Comments: Father at bedside   OBS brochure/video discussed/provided to patient/family: Yes              Name of person given brochure if not patient: Patient and her father               Relationship to patient:Father     For the majority of the shift this patient's behavior was Green.   Behavioral interventions performed  were None    ED NURSE PHONE NUMBER: [phone]

## 2020-03-11 NOTE — PROGRESS NOTES
.RECEIVING UNIT ED HANDOFF REVIEW    ED Nurse Handoff Report was reviewed by: Radha Patel RN on March 11, 2020 at 2:13 AM

## 2020-03-11 NOTE — CONSULTS
General Surgery Consultation      Mónica Aly MRN# [id number]   YOB: 1995 Age: 24 year old   Date of Admission: 3/10/2020     Reason for consult: I was asked by Dr. Hare to evaluate this patient for biliary colic.           Assessment and Plan:   Biliary colic, cholelithiasis.    I discussed with her and her father the rationale for laparoscopic cholecystectomy.  The risks, benefits, hopeful outcomes, and possible complications of this procedure were explained in detail.  Their questions were answered.  At this time though she would like to wait until her mother is back in town before proceeding with surgical treatment.  As she is symptoms free at the moment I think this is possible, she knows to get back to us should her symptoms return and we will help her set up an outpatient laparoscopic cholecystectomy.             Chief Complaint:   Right upper quadrant abdominal pain    History is obtained from the patient, electronic health record and emergency department physician         History of Present Illness:   This patient is a 24 year old female who presents with epigastric and right upper quadrant abdominal pain coupled with nausea and vomiting.  She has had similar but less intense symptoms between September and October of last year.  She was diagnosed with gallstones.  This time the episode was much more severe and he happened in the middle of the night.  Past Medical History:   Diagnosis Date     Bell palsy April 2014     Diabetes (H)      Gallstones      Hypertension                  Past Medical History:     Past Medical History:   Diagnosis Date     Bell palsy April 2014     Diabetes (H)      Gallstones      Hypertension              Past Surgical History:     Past Surgical History:   Procedure Laterality Date     C NONSPECIFIC PROCEDURE      Tonsillectomy               Social History:   I have reviewed this patient's social history          Family History:   I have reviewed this  patient's family history          Immunizations:     Immunization History   Administered Date(s) Administered     DTAP (<7y) 1996, 1997, 1997, 1998, 2000     HepB 1996, 1996, 1997     Hib (PRP-T) 1996, 1997, 1997, 1997     Influenza (IIV3) PF 2003, 2003, 2005     MMR 1998, 1998     Poliovirus, inactivated (IPV) 1996, 1997, 1997, 2000     TDAP Vaccine (Adacel) 2012             Allergies:     Allergies   Allergen Reactions     No Known Allergies              Medications:     Medications Prior to Admission   Medication Sig Dispense Refill Last Dose     acetaminophen (TYLENOL) 325 MG tablet Take 325-650 mg by mouth every 6 hours as needed for headaches        alcohol swab prep pads Use to swab area of injection/hola as directed. 100 each 3      amLODIPine (NORVASC) 10 MG tablet Take 10 mg by mouth At Bedtime         [] amoxicillin-clavulanate (AUGMENTIN) 875-125 MG tablet Take 1 tablet by mouth 2 times daily for 7 days 14 tablet 0      blood glucose (NO BRAND SPECIFIED) test strip Use to test blood sugar 4 times daily or as directed. To accompany: Blood Glucose Monitor Brands: per insurance. 100 strip 6      blood glucose calibration (NO BRAND SPECIFIED) solution To accompany: Blood Glucose Monitor Brands: per insurance. 1 Bottle 3      blood glucose monitoring (NO BRAND SPECIFIED) meter device kit Use to test blood sugar 4 times daily or as directed. Preferred blood glucose meter OR supplies to accompany: Blood Glucose Monitor Brands: per insurance. 1 kit 0      cetirizine (ZYRTEC) 10 MG tablet Take 10 mg by mouth daily as needed for allergies or rhinitis        guaiFENesin-codeine (ROBITUSSIN AC) 100-10 MG/5ML solution Take 5-10 mLs by mouth every 4 hours as needed for cough 180 mL 0      insulin aspart (NOVOLOG PEN) 100 UNIT/ML pen Please use 1 unit of Novolog for every 10 grams  of carbohydrate , Also cover your blood sugars as per sliding scale. 15 mL 1      insulin glargine (LANTUS SOLOSTAR PEN) 100 UNIT/ML pen Inject 55 Units Subcutaneous At Bedtime 15 mL 1      insulin pen needle (31G X 8 MM) 31G X 8 MM miscellaneous 1 Box of 100 insulin pen needles to be dispensed with every insulin pen prescription 100 each 0      lisinopril (PRINIVIL/ZESTRIL) 40 MG tablet Take 1 tablet (40 mg) by mouth daily 30 tablet 0      thin (NO BRAND SPECIFIED) lancets Use with lanceting device. To accompany: Blood Glucose Monitor Brands: per insurance. 100 each 6      vitamin D2 (ERGOCALCIFEROL) 16511 units (1250 mcg) capsule Take 50,000 Units by mouth once a week On Thursdays                Review of Systems:   The 10 point Review of Systems is negative other than noted in the HPI            Physical Exam:   Vitals were reviewed  Temp: 98.4  F (36.9  C) Temp src: Oral BP: (!) 151/66   Heart Rate: 92 Resp: 16 SpO2: 99 % O2 Device: None (Room air)    Constitutional:   awake, alert, cooperative, no apparent distress, and appears stated age     Eyes:   sclera clear     Lungs:   no increased work of breathing, good air exchange and no retractions     Abdomen:   Benign     Musculoskeletal:   tone is normal     Skin:   normal skin color, texture, turgor          Data:   All laboratory and imaging data in the past 24 hours reviewed

## 2020-03-11 NOTE — ED TRIAGE NOTES
Pt c/o upper abd pain started this AM at 0500. Having nausea and vomiting. Pt reports hx of gallstones

## 2020-03-11 NOTE — H&P
[facility]    History and Physical  Hospitalist       Date of Admission:  3/10/2020  Date of Service (when I saw the patient): 03/11/20    ASSESSMENT  Mónica Aly is a markedly pleasant 24 year old woman with morbid obesity, Type 2 Diabetes mellitus, and hypertension who presents with nausea, vomiting and abdominal pain and is found to have symptomatic cholelithiasis and hypokalemia.    PLAN    1) Symptomatic cholelithiasis:    -- Observation. NPO. Surgery consulted. Empiric Zosyn ordered.  ml/hour IV fluid. Tylenol, Oxycodone, IV Dilaudid as needed for pain. Anti-emetics as needed.    2) Hypokalemia: Replacing; check Magnesium    3) Diabetes: Diagnosed during hospitalization here in 5/2019. A1c was 5.6 on 12/2019.    -- ISS insulin ordered for now. Resume Lantus when verified and diet successfully advanced.    4) Hypertension: Resume Norvasc, lisinopril when verified and as able clinically    Chief Complaint   Abdominal pain    History is obtained from the patient, her father at the bedside, and the ED physician whom I have spoken with    History of Present Illness   Mónica Aly is a markedly pleasant 24 year old woman who presents with acute onset abdominal pain that is sharp and severe, located in the right upper quadrant radiating into the epigastrium, constant since onset 5 AM when it woke her from sleep, associated with nausea and multiple episodes of non-bloody vomiting all day and evening today, leading to inability to keep any food or liquid down. She had a similar episode of such symptoms 9/2019 and was told that by US at that time she had gallstones. Since then she has had ongoing intermittent nausea with post-prandial vomiting, though usually without the pain until this AM. Otherwise, she denies fever, chills, sweats, or any other acute complaints.    In the ED, Blood pressure (!) 152/96, temperature 98.7  F (37.1  C), temperature source Oral, resp. rate 8, weight 142.9 kg (315  lb), last menstrual period 02/24/2020, SpO2 100 %, not currently breastfeeding.    CBC was within normal reference range. CMP notable for K 2.4, ca 8.3, alb 3.3, otherwise was within normal reference range. Lipase was 62. EKG showed NSR without ST segment changes.     US abdomen reportedly showed cholelithiasis without wall thickening, ascites, or ductal dilation.     She was given IV fluid, Reglan, Dilaudid, and Potassium in the ED.    PHYSICAL EXAM  Blood pressure (!) 152/96, temperature 98.7  F (37.1  C), temperature source Oral, resp. rate 8, weight 142.9 kg (315 lb), last menstrual period 02/24/2020, SpO2 100 %, not currently breastfeeding.  Constitutional: Alert and oriented to person, place and time; no apparent distress  HEENT: normocephalic moist mucus membranes  Respiratory: lungs clear to auscultation bilaterally  Cardiovascular: regular S1 S2 no murmurs rubs or gallops  GI: abdomen soft mildly tender in RUQ and epigastrium, non distended bowel sounds positive  Lymph/Hematologic: no pallor, no cervical lymphadenopathy  Skin: no rash, good turgor  Musculoskeletal: no clubbing, cyanosis or edema  Neurologic: extra-ocular muscles intact; moves all four extremities  Psychiatric: appropriate affect, insight and judgment     DVT Prophylaxis: Pneumatic Compression Devices  Code Status: Full Code    Disposition: Expected discharge in 0-1 days    Edgar Hare MD    Past Medical History    I have reviewed this patient's medical history and updated it with pertinent information if needed.   Past Medical History:   Diagnosis Date     Bell palsy April 2014     Diabetes (H)      Gallstones      Hypertension        Past Surgical History   I have reviewed this patient's surgical history and updated it with pertinent information if needed.  Past Surgical History:   Procedure Laterality Date     C NONSPECIFIC PROCEDURE      Tonsillectomy       Prior to Admission Medications   Prior to Admission Medications    Prescriptions Last Dose Informant Patient Reported? Taking?   acetaminophen (TYLENOL) 325 MG tablet  Self Yes No   Sig: Take 325-650 mg by mouth every 6 hours as needed for headaches   alcohol swab prep pads   No No   Sig: Use to swab area of injection/hola as directed.   amLODIPine (NORVASC) 10 MG tablet  Self Yes No   Sig: Take 10 mg by mouth At Bedtime    amoxicillin-clavulanate (AUGMENTIN) 875-125 MG tablet   No No   Sig: Take 1 tablet by mouth 2 times daily for 7 days   blood glucose (NO BRAND SPECIFIED) test strip   No No   Sig: Use to test blood sugar 4 times daily or as directed. To accompany: Blood Glucose Monitor Brands: per insurance.   blood glucose calibration (NO BRAND SPECIFIED) solution   No No   Sig: To accompany: Blood Glucose Monitor Brands: per insurance.   blood glucose monitoring (NO BRAND SPECIFIED) meter device kit   No No   Sig: Use to test blood sugar 4 times daily or as directed. Preferred blood glucose meter OR supplies to accompany: Blood Glucose Monitor Brands: per insurance.   cetirizine (ZYRTEC) 10 MG tablet  Self Yes No   Sig: Take 10 mg by mouth daily as needed for allergies or rhinitis   guaiFENesin-codeine (ROBITUSSIN AC) 100-10 MG/5ML solution   No No   Sig: Take 5-10 mLs by mouth every 4 hours as needed for cough   insulin aspart (NOVOLOG PEN) 100 UNIT/ML pen   No No   Sig: Please use 1 unit of Novolog for every 10 grams of carbohydrate , Also cover your blood sugars as per sliding scale.   insulin glargine (LANTUS SOLOSTAR PEN) 100 UNIT/ML pen   No No   Sig: Inject 55 Units Subcutaneous At Bedtime   insulin pen needle (31G X 8 MM) 31G X 8 MM miscellaneous   No No   Si Box of 100 insulin pen needles to be dispensed with every insulin pen prescription   lisinopril (PRINIVIL/ZESTRIL) 40 MG tablet   No No   Sig: Take 1 tablet (40 mg) by mouth daily   thin (NO BRAND SPECIFIED) lancets   No No   Sig: Use with lanceting device. To accompany: Blood Glucose Monitor Brands: per  insurance.   vitamin D2 (ERGOCALCIFEROL) 68109 units (1250 mcg) capsule  Self Yes No   Sig: Take 50,000 Units by mouth once a week On Thursdays      Facility-Administered Medications: None     Allergies   Allergies   Allergen Reactions     No Known Allergies        Social History   I have reviewed this patient's social history and updated it with pertinent information if needed. Mónica Aly  reports that she has never smoked. She has never used smokeless tobacco. She reports that she does not drink alcohol or use drugs.    Family History   Family history assessed and, except as above, is non-contributory.    Review of Systems   The 10 point Review of Systems is negative other than noted in the HPI or here.     Primary Care Physician   Ximena Og    Data   Labs Ordered and Resulted from Time of ED Arrival Up to the Time of Departure from the ED   COMPREHENSIVE METABOLIC PANEL - Abnormal; Notable for the following components:       Result Value    Potassium 2.4 (*)     Glucose 171 (*)     Urea Nitrogen 5 (*)     Calcium 8.3 (*)     Albumin 3.3 (*)     All other components within normal limits   LIPASE - Abnormal; Notable for the following components:    Lipase 62 (*)     All other components within normal limits   CBC WITH PLATELETS DIFFERENTIAL   HCG QUALITATIVE   CARDIAC CONTINUOUS MONITORING   PERIPHERAL IV CATHETER       Data reviewed today:  I personally reviewed the EKG tracing showing NSR without ST segment changes.    Recent Results (from the past 24 hour(s))   US Abdomen Limited    Narrative    EXAM: US ABDOMEN LIMITED  LOCATION: [facility]  DATE/TIME: 3/11/2020 12:31 AM    INDICATION: Right upper quadrant abdominal pain.  COMPARISON: None.  TECHNIQUE: Limited abdominal ultrasound.    FINDINGS:    GALLBLADDER: A few gallstones. No gallbladder wall thickening.    BILE DUCTS: No biliary dilatation. The common duct measures 3 mm.    LIVER: Normal parenchyma with smooth contour. No focal  mass.    RIGHT KIDNEY: No hydronephrosis.    PANCREAS: The visualized portions are normal.    No ascites.      Impression    IMPRESSION:  1.  Cholelithiasis.

## 2020-03-11 NOTE — DISCHARGE SUMMARY
[facility]  Hospitalist Discharge Summary       Date of Admission:  3/10/2020  Date of Discharge:  3/11/2020 12:36 PM  Discharging Provider: Kevin Castorena DO      Discharge Diagnoses   1. Cholelithiasis w/biliary colic  2. DM, likely type I   3. HTN     Follow-ups Needed After Discharge   Follow-up Appointments     Follow-up and recommended labs and tests       Follow up with primary care provider, Ximena Og, within 2-4   weeks, for hospital follow- up. No follow up labs or test are needed.  Follow up with general surgery as planned           Unresulted Labs Ordered in the Past 30 Days of this Admission     No orders found for last 31 day(s).          Discharge Disposition   Discharged to home  Condition at discharge: Stable    Hospital Course   Admitted for biliary colic.  Seen by surgery and after discussing with patient decided to proceed with outpatient cholecystectomy in the future.  At this time patient's symptoms have greatly improved and she was tolerating diet.      Consultations This Hospital Stay   SURGERY GENERAL IP CONSULT    Code Status   Full Code    Time Spent on this Encounter   I, Kevin Castorena DO, personally saw the patient today and spent less than or equal to 30 minutes discharging this patient.       Kevin Castorena DO  [facility]  ______________________________________________________________________    Physical Exam   Vital Signs: Temp: 98.4  F (36.9  C) Temp src: Oral BP: (!) 151/66   Heart Rate: 92 Resp: 16 SpO2: 99 % O2 Device: None (Room air)    Weight: 323 lbs 4.8 oz  General Appearance: Resting comfortably.  NAD   Respiratory: Clear to auscultation.  No respiratory distress  Cardiovascular: RRR.  No murmurs  GI: Bowel sounds present.  Non-tender  Skin: No rashes.  No cyanosis  Other: No edema.  No calf tenderness         Primary Care Physician   Ximena Og    Discharge Orders      Reason for your hospital stay     Cholelithiasis (gallstones)     Follow-up and recommended labs and tests     Follow up with primary care provider, Ximena Og, within 2-4 weeks, for hospital follow- up. No follow up labs or test are needed.  Follow up with general surgery as planned     Activity    Your activity upon discharge: activity as tolerated     Diet    Follow this diet upon discharge: Orders Placed This Encounter      Low Fat Diet       Significant Results and Procedures   Most Recent 3 CBC's:  Recent Labs   Lab Test 03/11/20  0703 03/10/20  2237 05/21/19  0821   WBC 8.7 11.0 6.0   HGB 12.4 13.5 14.3   MCV 86 86 83    311 298     Most Recent 3 BMP's:  Recent Labs   Lab Test 03/11/20  0703 03/10/20  2237 05/25/19  0748    136 139   POTASSIUM 2.8* 2.4* 3.5   CHLORIDE 109 103 108   CO2 26 28 25   BUN 4* 5* 12   CR 0.59 0.55 0.61   ANIONGAP 5 5 6   GAVIN 7.8* 8.3* 8.5   * 171* 218*   ,   Results for orders placed or performed during the hospital encounter of 03/10/20   US Abdomen Limited    Narrative    EXAM: US ABDOMEN LIMITED  LOCATION: [facility]  DATE/TIME: 3/11/2020 12:31 AM    INDICATION: Right upper quadrant abdominal pain.  COMPARISON: None.  TECHNIQUE: Limited abdominal ultrasound.    FINDINGS:    GALLBLADDER: A few gallstones. No gallbladder wall thickening.    BILE DUCTS: No biliary dilatation. The common duct measures 3 mm.    LIVER: Normal parenchyma with smooth contour. No focal mass.    RIGHT KIDNEY: No hydronephrosis.    PANCREAS: The visualized portions are normal.    No ascites.      Impression    IMPRESSION:  1.  Cholelithiasis.       Discharge Medications   Current Discharge Medication List      START taking these medications    Details   ondansetron (ZOFRAN-ODT) 4 MG ODT tab Take 1 tablet (4 mg) by mouth every 6 hours as needed for nausea or vomiting  Qty: 15 tablet, Refills: 0    Comments: Future refills by PCP Dr. Ximena Og with phone number [phone].  Associated  Diagnoses: Biliary calculus of other site without obstruction         CONTINUE these medications which have NOT CHANGED    Details   acetaminophen (TYLENOL) 325 MG tablet Take 325-650 mg by mouth every 6 hours as needed for headaches      alcohol swab prep pads Use to swab area of injection/hola as directed.  Qty: 100 each, Refills: 3    Associated Diagnoses: Diabetic ketoacidosis without coma associated with type 1 diabetes mellitus (H)      amLODIPine (NORVASC) 10 MG tablet Take 10 mg by mouth At Bedtime       blood glucose (NO BRAND SPECIFIED) test strip Use to test blood sugar 4 times daily or as directed. To accompany: Blood Glucose Monitor Brands: per insurance.  Qty: 100 strip, Refills: 6    Associated Diagnoses: Diabetic ketoacidosis without coma associated with type 1 diabetes mellitus (H)      blood glucose calibration (NO BRAND SPECIFIED) solution To accompany: Blood Glucose Monitor Brands: per insurance.  Qty: 1 Bottle, Refills: 3    Associated Diagnoses: Diabetic ketoacidosis without coma associated with type 1 diabetes mellitus (H)      blood glucose monitoring (NO BRAND SPECIFIED) meter device kit Use to test blood sugar 4 times daily or as directed. Preferred blood glucose meter OR supplies to accompany: Blood Glucose Monitor Brands: per insurance.  Qty: 1 kit, Refills: 0    Associated Diagnoses: Diabetic ketoacidosis without coma associated with type 1 diabetes mellitus (H)      cetirizine (ZYRTEC) 10 MG tablet Take 10 mg by mouth daily as needed for allergies or rhinitis      guaiFENesin-codeine (ROBITUSSIN AC) 100-10 MG/5ML solution Take 5-10 mLs by mouth every 4 hours as needed for cough  Qty: 180 mL, Refills: 0      insulin aspart (NOVOLOG PEN) 100 UNIT/ML pen Please use 1 unit of Novolog for every 10 grams of carbohydrate , Also cover your blood sugars as per sliding scale.  Qty: 15 mL, Refills: 1    Comments: Future refills by PCP Dr. Ximena Og with phone number  [phone].  Associated Diagnoses: Diabetic ketoacidosis without coma associated with type 1 diabetes mellitus (H)      insulin glargine (LANTUS SOLOSTAR PEN) 100 UNIT/ML pen Inject 55 Units Subcutaneous At Bedtime  Qty: 15 mL, Refills: 1    Associated Diagnoses: Diabetic ketoacidosis without coma associated with type 1 diabetes mellitus (H)      insulin pen needle (31G X 8 MM) 31G X 8 MM miscellaneous 1 Box of 100 insulin pen needles to be dispensed with every insulin pen prescription  Qty: 100 each, Refills: 0    Associated Diagnoses: Diabetic ketoacidosis without coma associated with type 1 diabetes mellitus (H)      lisinopril (PRINIVIL/ZESTRIL) 40 MG tablet Take 1 tablet (40 mg) by mouth daily  Qty: 30 tablet, Refills: 0    Comments: Future refills by PCP Dr. Ximena Og with phone number [phone].  Associated Diagnoses: Diabetic ketoacidosis without coma associated with type 1 diabetes mellitus (H)      thin (NO BRAND SPECIFIED) lancets Use with lanceting device. To accompany: Blood Glucose Monitor Brands: per insurance.  Qty: 100 each, Refills: 6    Associated Diagnoses: Diabetic ketoacidosis without coma associated with type 1 diabetes mellitus (H)      vitamin D2 (ERGOCALCIFEROL) 49121 units (1250 mcg) capsule Take 50,000 Units by mouth once a week On Thursdays         STOP taking these medications       amoxicillin-clavulanate (AUGMENTIN) 875-125 MG tablet Comments:   Reason for Stopping:             Allergies   Allergies   Allergen Reactions     No Known Allergies

## 2020-03-11 NOTE — PLAN OF CARE
A&O.  AVSS.  Tele SR.  Dilaudid for pain.  Magnesium replaced.  Recheck lab ordered for this am.  Potassium replacement started.  Surgery consult.  NPO.  LR infusing.  Voiding.

## 2020-03-12 ENCOUNTER — TELEPHONE (OUTPATIENT)
Dept: SURGERY | Facility: CLINIC | Age: 25
End: 2020-03-12

## 2020-03-12 PROBLEM — K80.20 CHOLELITHIASIS: Status: ACTIVE | Noted: 2020-03-12

## 2020-03-12 NOTE — TELEPHONE ENCOUNTER
Type of surgery: Lap ivonne  Location of surgery: [facility]  Date and time of surgery: 3/18/20 at 10:30am  Surgeon: Dr. Jasper Becker  Pre-Op Appt Date: Completed  Post-Op Appt Date: Patient to schedule   Packet sent out: Yes  Pre-cert/Authorization completed:  Not Applicable  Date: 3/13/20

## 2020-03-17 RX ORDER — LORAZEPAM 0.5 MG/1
0.5 TABLET ORAL EVERY 6 HOURS PRN
COMMUNITY

## 2020-03-17 RX ORDER — CETIRIZINE HYDROCHLORIDE 10 MG/1
10 TABLET ORAL DAILY
Status: ON HOLD | COMMUNITY
End: 2020-03-18

## 2020-03-17 RX ORDER — POTASSIUM CHLORIDE 750 MG/1
10 TABLET, EXTENDED RELEASE ORAL 2 TIMES DAILY
COMMUNITY

## 2020-03-17 RX ORDER — CLINDAMYCIN PHOSPHATE 10 UG/ML
LOTION TOPICAL 2 TIMES DAILY
COMMUNITY
End: 2022-11-15

## 2020-03-18 ENCOUNTER — APPOINTMENT (OUTPATIENT)
Dept: SURGERY | Facility: PHYSICIAN GROUP | Age: 25
End: 2020-03-18
Payer: COMMERCIAL

## 2020-03-18 ENCOUNTER — HOSPITAL ENCOUNTER (OUTPATIENT)
Facility: CLINIC | Age: 25
Discharge: HOME | End: 2020-03-18
Attending: SURGERY | Admitting: SURGERY
Payer: COMMERCIAL

## 2020-03-18 VITALS
DIASTOLIC BLOOD PRESSURE: 69 MMHG | BODY MASS INDEX: 43.4 KG/M2 | WEIGHT: 293 LBS | HEART RATE: 95 BPM | RESPIRATION RATE: 22 BRPM | TEMPERATURE: 98.2 F | SYSTOLIC BLOOD PRESSURE: 109 MMHG | OXYGEN SATURATION: 96 % | HEIGHT: 69 IN

## 2020-03-18 DIAGNOSIS — G89.18 POSTOPERATIVE PAIN: Primary | ICD-10-CM

## 2020-03-18 DIAGNOSIS — K80.20 CHOLELITHIASIS: ICD-10-CM

## 2020-03-18 LAB
GLUCOSE BLDC GLUCOMTR-MCNC: 123 MG/DL (ref 70–99)
GLUCOSE BLDC GLUCOMTR-MCNC: 142 MG/DL (ref 70–99)
HCG UR QL: NEGATIVE

## 2020-03-18 PROCEDURE — 88304 TISSUE EXAM BY PATHOLOGIST: CPT | Mod: 26 | Performed by: SURGERY

## 2020-03-18 PROCEDURE — 36000058 ZZH SURGERY LEVEL 3 EA 15 ADDTL MIN: Performed by: SURGERY

## 2020-03-18 PROCEDURE — 25000128 H RX IP 250 OP 636: Performed by: ANESTHESIOLOGY

## 2020-03-18 PROCEDURE — 36000056 ZZH SURGERY LEVEL 3 1ST 30 MIN: Performed by: SURGERY

## 2020-03-18 PROCEDURE — 25800030 ZZH RX IP 258 OP 636: Performed by: ANESTHESIOLOGY

## 2020-03-18 PROCEDURE — 81025 URINE PREGNANCY TEST: CPT | Performed by: ANESTHESIOLOGY

## 2020-03-18 PROCEDURE — 71000027 ZZH RECOVERY PHASE 2 EACH 15 MINS: Performed by: SURGERY

## 2020-03-18 PROCEDURE — 37000008 ZZH ANESTHESIA TECHNICAL FEE, 1ST 30 MIN: Performed by: SURGERY

## 2020-03-18 PROCEDURE — 25000125 ZZHC RX 250: Performed by: NURSE ANESTHETIST, CERTIFIED REGISTERED

## 2020-03-18 PROCEDURE — 25000132 ZZH RX MED GY IP 250 OP 250 PS 637: Performed by: ANESTHESIOLOGY

## 2020-03-18 PROCEDURE — 25000128 H RX IP 250 OP 636: Performed by: SURGERY

## 2020-03-18 PROCEDURE — 25000128 H RX IP 250 OP 636: Performed by: NURSE ANESTHETIST, CERTIFIED REGISTERED

## 2020-03-18 PROCEDURE — 88304 TISSUE EXAM BY PATHOLOGIST: CPT | Performed by: SURGERY

## 2020-03-18 PROCEDURE — 25800025 ZZH RX 258: Performed by: SURGERY

## 2020-03-18 PROCEDURE — 82962 GLUCOSE BLOOD TEST: CPT

## 2020-03-18 PROCEDURE — 27210794 ZZH OR GENERAL SUPPLY STERILE: Performed by: SURGERY

## 2020-03-18 PROCEDURE — 71000017 ZZH RECOVERY PHASE 1 LEVEL 3 EA ADDTL HR: Performed by: SURGERY

## 2020-03-18 PROCEDURE — 40000170 ZZH STATISTIC PRE-PROCEDURE ASSESSMENT II: Performed by: SURGERY

## 2020-03-18 PROCEDURE — 27210995 ZZH RX 272: Performed by: SURGERY

## 2020-03-18 PROCEDURE — 25000132 ZZH RX MED GY IP 250 OP 250 PS 637: Performed by: NURSE ANESTHETIST, CERTIFIED REGISTERED

## 2020-03-18 PROCEDURE — 25000125 ZZHC RX 250: Performed by: SURGERY

## 2020-03-18 PROCEDURE — 71000016 ZZH RECOVERY PHASE 1 LEVEL 3 FIRST HR: Performed by: SURGERY

## 2020-03-18 PROCEDURE — 25000566 ZZH SEVOFLURANE, EA 15 MIN: Performed by: SURGERY

## 2020-03-18 PROCEDURE — 47562 LAPAROSCOPIC CHOLECYSTECTOMY: CPT | Performed by: SURGERY

## 2020-03-18 PROCEDURE — 25800030 ZZH RX IP 258 OP 636: Performed by: NURSE ANESTHETIST, CERTIFIED REGISTERED

## 2020-03-18 PROCEDURE — 37000009 ZZH ANESTHESIA TECHNICAL FEE, EACH ADDTL 15 MIN: Performed by: SURGERY

## 2020-03-18 PROCEDURE — 47562 LAPAROSCOPIC CHOLECYSTECTOMY: CPT | Mod: AS | Performed by: PHYSICIAN ASSISTANT

## 2020-03-18 RX ORDER — CEFAZOLIN SODIUM 1 G/3ML
1 INJECTION, POWDER, FOR SOLUTION INTRAMUSCULAR; INTRAVENOUS SEE ADMIN INSTRUCTIONS
Status: DISCONTINUED | OUTPATIENT
Start: 2020-03-18 | End: 2020-03-18 | Stop reason: HOSPADM

## 2020-03-18 RX ORDER — ONDANSETRON 4 MG/1
4 TABLET, ORALLY DISINTEGRATING ORAL EVERY 30 MIN PRN
Status: DISCONTINUED | OUTPATIENT
Start: 2020-03-18 | End: 2020-03-18 | Stop reason: HOSPADM

## 2020-03-18 RX ORDER — ACETAMINOPHEN 500 MG
1000 TABLET ORAL ONCE
Status: COMPLETED | OUTPATIENT
Start: 2020-03-18 | End: 2020-03-18

## 2020-03-18 RX ORDER — NALOXONE HYDROCHLORIDE 0.4 MG/ML
.1-.4 INJECTION, SOLUTION INTRAMUSCULAR; INTRAVENOUS; SUBCUTANEOUS
Status: DISCONTINUED | OUTPATIENT
Start: 2020-03-18 | End: 2020-03-18 | Stop reason: HOSPADM

## 2020-03-18 RX ORDER — LIDOCAINE HYDROCHLORIDE 10 MG/ML
INJECTION, SOLUTION EPIDURAL; INFILTRATION; INTRACAUDAL; PERINEURAL
Status: DISCONTINUED
Start: 2020-03-18 | End: 2020-03-18 | Stop reason: HOSPADM

## 2020-03-18 RX ORDER — OXYCODONE HYDROCHLORIDE 5 MG/1
5-10 TABLET ORAL
Status: DISCONTINUED | OUTPATIENT
Start: 2020-03-18 | End: 2020-03-18 | Stop reason: HOSPADM

## 2020-03-18 RX ORDER — HYDROMORPHONE HYDROCHLORIDE 1 MG/ML
.3-.5 INJECTION, SOLUTION INTRAMUSCULAR; INTRAVENOUS; SUBCUTANEOUS EVERY 10 MIN PRN
Status: DISCONTINUED | OUTPATIENT
Start: 2020-03-18 | End: 2020-03-18 | Stop reason: HOSPADM

## 2020-03-18 RX ORDER — DIPHENHYDRAMINE HYDROCHLORIDE 50 MG/ML
INJECTION INTRAMUSCULAR; INTRAVENOUS PRN
Status: DISCONTINUED | OUTPATIENT
Start: 2020-03-18 | End: 2020-03-18

## 2020-03-18 RX ORDER — SODIUM CHLORIDE, SODIUM LACTATE, POTASSIUM CHLORIDE, CALCIUM CHLORIDE 600; 310; 30; 20 MG/100ML; MG/100ML; MG/100ML; MG/100ML
INJECTION, SOLUTION INTRAVENOUS CONTINUOUS
Status: DISCONTINUED | OUTPATIENT
Start: 2020-03-18 | End: 2020-03-18 | Stop reason: HOSPADM

## 2020-03-18 RX ORDER — OXYCODONE HYDROCHLORIDE 5 MG/1
5-10 TABLET ORAL EVERY 4 HOURS PRN
Qty: 10 TABLET | Refills: 0 | Status: SHIPPED | OUTPATIENT
Start: 2020-03-18 | End: 2022-11-15

## 2020-03-18 RX ORDER — KETOROLAC TROMETHAMINE 30 MG/ML
INJECTION, SOLUTION INTRAMUSCULAR; INTRAVENOUS PRN
Status: DISCONTINUED | OUTPATIENT
Start: 2020-03-18 | End: 2020-03-18

## 2020-03-18 RX ORDER — ONDANSETRON 2 MG/ML
INJECTION INTRAMUSCULAR; INTRAVENOUS PRN
Status: DISCONTINUED | OUTPATIENT
Start: 2020-03-18 | End: 2020-03-18

## 2020-03-18 RX ORDER — ACETAMINOPHEN 325 MG/1
650 TABLET ORAL
Status: DISCONTINUED | OUTPATIENT
Start: 2020-03-18 | End: 2020-03-18

## 2020-03-18 RX ORDER — HYDROXYZINE HYDROCHLORIDE 50 MG/ML
50 INJECTION, SOLUTION INTRAMUSCULAR EVERY 6 HOURS PRN
Status: COMPLETED | OUTPATIENT
Start: 2020-03-18 | End: 2020-03-18

## 2020-03-18 RX ORDER — NEOSTIGMINE METHYLSULFATE 1 MG/ML
VIAL (ML) INJECTION PRN
Status: DISCONTINUED | OUTPATIENT
Start: 2020-03-18 | End: 2020-03-18

## 2020-03-18 RX ORDER — GLYCOPYRROLATE 0.2 MG/ML
INJECTION, SOLUTION INTRAMUSCULAR; INTRAVENOUS PRN
Status: DISCONTINUED | OUTPATIENT
Start: 2020-03-18 | End: 2020-03-18

## 2020-03-18 RX ORDER — LIDOCAINE HYDROCHLORIDE 20 MG/ML
INJECTION, SOLUTION INFILTRATION; PERINEURAL PRN
Status: DISCONTINUED | OUTPATIENT
Start: 2020-03-18 | End: 2020-03-18

## 2020-03-18 RX ORDER — FENTANYL CITRATE 50 UG/ML
INJECTION, SOLUTION INTRAMUSCULAR; INTRAVENOUS PRN
Status: DISCONTINUED | OUTPATIENT
Start: 2020-03-18 | End: 2020-03-18

## 2020-03-18 RX ORDER — PROPOFOL 10 MG/ML
INJECTION, EMULSION INTRAVENOUS PRN
Status: DISCONTINUED | OUTPATIENT
Start: 2020-03-18 | End: 2020-03-18

## 2020-03-18 RX ORDER — ACETAMINOPHEN 500 MG
1000 TABLET ORAL
Status: DISCONTINUED | OUTPATIENT
Start: 2020-03-18 | End: 2020-03-18 | Stop reason: HOSPADM

## 2020-03-18 RX ORDER — BUPIVACAINE HYDROCHLORIDE AND EPINEPHRINE 5; 5 MG/ML; UG/ML
INJECTION, SOLUTION EPIDURAL; INTRACAUDAL; PERINEURAL
Status: DISCONTINUED
Start: 2020-03-18 | End: 2020-03-18 | Stop reason: HOSPADM

## 2020-03-18 RX ORDER — ONDANSETRON 2 MG/ML
4 INJECTION INTRAMUSCULAR; INTRAVENOUS EVERY 30 MIN PRN
Status: DISCONTINUED | OUTPATIENT
Start: 2020-03-18 | End: 2020-03-18 | Stop reason: HOSPADM

## 2020-03-18 RX ORDER — SENNA AND DOCUSATE SODIUM 50; 8.6 MG/1; MG/1
1-2 TABLET, FILM COATED ORAL 2 TIMES DAILY PRN
Qty: 20 TABLET | Refills: 0 | Status: SHIPPED | OUTPATIENT
Start: 2020-03-18 | End: 2022-11-15

## 2020-03-18 RX ORDER — VECURONIUM BROMIDE 1 MG/ML
INJECTION, POWDER, LYOPHILIZED, FOR SOLUTION INTRAVENOUS PRN
Status: DISCONTINUED | OUTPATIENT
Start: 2020-03-18 | End: 2020-03-18

## 2020-03-18 RX ORDER — CEFAZOLIN SODIUM IN 0.9 % NACL 3 G/100 ML
3 INTRAVENOUS SOLUTION, PIGGYBACK (ML) INTRAVENOUS
Status: COMPLETED | OUTPATIENT
Start: 2020-03-18 | End: 2020-03-18

## 2020-03-18 RX ORDER — FENTANYL CITRATE 50 UG/ML
25-50 INJECTION, SOLUTION INTRAMUSCULAR; INTRAVENOUS EVERY 5 MIN PRN
Status: DISCONTINUED | OUTPATIENT
Start: 2020-03-18 | End: 2020-03-18 | Stop reason: HOSPADM

## 2020-03-18 RX ORDER — SODIUM CHLORIDE, SODIUM LACTATE, POTASSIUM CHLORIDE, CALCIUM CHLORIDE 600; 310; 30; 20 MG/100ML; MG/100ML; MG/100ML; MG/100ML
500 INJECTION, SOLUTION INTRAVENOUS CONTINUOUS
Status: DISCONTINUED | OUTPATIENT
Start: 2020-03-18 | End: 2020-03-18 | Stop reason: HOSPADM

## 2020-03-18 RX ORDER — MAGNESIUM HYDROXIDE 1200 MG/15ML
LIQUID ORAL PRN
Status: DISCONTINUED | OUTPATIENT
Start: 2020-03-18 | End: 2020-03-18 | Stop reason: HOSPADM

## 2020-03-18 RX ORDER — PROPOFOL 10 MG/ML
INJECTION, EMULSION INTRAVENOUS CONTINUOUS PRN
Status: DISCONTINUED | OUTPATIENT
Start: 2020-03-18 | End: 2020-03-18

## 2020-03-18 RX ORDER — FENTANYL CITRATE 0.05 MG/ML
25-50 INJECTION, SOLUTION INTRAMUSCULAR; INTRAVENOUS
Status: DISCONTINUED | OUTPATIENT
Start: 2020-03-18 | End: 2020-03-18 | Stop reason: HOSPADM

## 2020-03-18 RX ORDER — ALBUTEROL SULFATE 90 UG/1
AEROSOL, METERED RESPIRATORY (INHALATION) PRN
Status: DISCONTINUED | OUTPATIENT
Start: 2020-03-18 | End: 2020-03-18

## 2020-03-18 RX ORDER — MEPERIDINE HYDROCHLORIDE 25 MG/ML
12.5 INJECTION INTRAMUSCULAR; INTRAVENOUS; SUBCUTANEOUS
Status: DISCONTINUED | OUTPATIENT
Start: 2020-03-18 | End: 2020-03-18 | Stop reason: HOSPADM

## 2020-03-18 RX ADMIN — PHENYLEPHRINE HYDROCHLORIDE 100 MCG: 10 INJECTION INTRAVENOUS at 10:10

## 2020-03-18 RX ADMIN — DIPHENHYDRAMINE HYDROCHLORIDE 12.5 MG: 50 INJECTION, SOLUTION INTRAMUSCULAR; INTRAVENOUS at 09:40

## 2020-03-18 RX ADMIN — FENTANYL CITRATE 50 MCG: 50 INJECTION, SOLUTION INTRAMUSCULAR; INTRAVENOUS at 13:02

## 2020-03-18 RX ADMIN — PHENYLEPHRINE HYDROCHLORIDE 100 MCG: 10 INJECTION INTRAVENOUS at 11:46

## 2020-03-18 RX ADMIN — VECURONIUM BROMIDE 2 MG: 1 INJECTION, POWDER, LYOPHILIZED, FOR SOLUTION INTRAVENOUS at 12:00

## 2020-03-18 RX ADMIN — Medication 3 G: at 09:40

## 2020-03-18 RX ADMIN — HYDROMORPHONE HYDROCHLORIDE 0.5 MG: 1 INJECTION, SOLUTION INTRAMUSCULAR; INTRAVENOUS; SUBCUTANEOUS at 13:39

## 2020-03-18 RX ADMIN — KETOROLAC TROMETHAMINE 15 MG: 30 INJECTION, SOLUTION INTRAMUSCULAR at 13:02

## 2020-03-18 RX ADMIN — NEOSTIGMINE METHYLSULFATE 5 MG: 1 INJECTION, SOLUTION INTRAVENOUS at 12:33

## 2020-03-18 RX ADMIN — FENTANYL CITRATE 50 MCG: 50 INJECTION, SOLUTION INTRAMUSCULAR; INTRAVENOUS at 12:59

## 2020-03-18 RX ADMIN — FENTANYL CITRATE 100 MCG: 50 INJECTION, SOLUTION INTRAMUSCULAR; INTRAVENOUS at 09:26

## 2020-03-18 RX ADMIN — PHENYLEPHRINE HYDROCHLORIDE 100 MCG: 10 INJECTION INTRAVENOUS at 11:01

## 2020-03-18 RX ADMIN — SODIUM CHLORIDE, POTASSIUM CHLORIDE, SODIUM LACTATE AND CALCIUM CHLORIDE: 600; 310; 30; 20 INJECTION, SOLUTION INTRAVENOUS at 11:21

## 2020-03-18 RX ADMIN — ROCURONIUM BROMIDE 10 MG: 10 INJECTION INTRAVENOUS at 11:30

## 2020-03-18 RX ADMIN — MIDAZOLAM 2 MG: 1 INJECTION INTRAMUSCULAR; INTRAVENOUS at 09:18

## 2020-03-18 RX ADMIN — GLYCOPYRROLATE 0.8 MG: 0.2 INJECTION, SOLUTION INTRAMUSCULAR; INTRAVENOUS at 12:33

## 2020-03-18 RX ADMIN — PHENYLEPHRINE HYDROCHLORIDE 100 MCG: 10 INJECTION INTRAVENOUS at 11:53

## 2020-03-18 RX ADMIN — PHENYLEPHRINE HYDROCHLORIDE 100 MCG: 10 INJECTION INTRAVENOUS at 12:10

## 2020-03-18 RX ADMIN — ACETAMINOPHEN 1000 MG: 500 TABLET, FILM COATED ORAL at 14:15

## 2020-03-18 RX ADMIN — PHENYLEPHRINE HYDROCHLORIDE 100 MCG: 10 INJECTION INTRAVENOUS at 11:43

## 2020-03-18 RX ADMIN — Medication 1 G: at 11:40

## 2020-03-18 RX ADMIN — ROCURONIUM BROMIDE 10 MG: 10 INJECTION INTRAVENOUS at 10:32

## 2020-03-18 RX ADMIN — HYDROMORPHONE HYDROCHLORIDE 0.5 MG: 1 INJECTION, SOLUTION INTRAMUSCULAR; INTRAVENOUS; SUBCUTANEOUS at 14:00

## 2020-03-18 RX ADMIN — FENTANYL CITRATE 50 MCG: 50 INJECTION, SOLUTION INTRAMUSCULAR; INTRAVENOUS at 10:00

## 2020-03-18 RX ADMIN — HYDROMORPHONE HYDROCHLORIDE 0.5 MG: 1 INJECTION, SOLUTION INTRAMUSCULAR; INTRAVENOUS; SUBCUTANEOUS at 13:19

## 2020-03-18 RX ADMIN — SODIUM CHLORIDE, POTASSIUM CHLORIDE, SODIUM LACTATE AND CALCIUM CHLORIDE: 600; 310; 30; 20 INJECTION, SOLUTION INTRAVENOUS at 09:17

## 2020-03-18 RX ADMIN — ALBUTEROL SULFATE 6 PUFF: 90 AEROSOL, METERED RESPIRATORY (INHALATION) at 09:45

## 2020-03-18 RX ADMIN — ROCURONIUM BROMIDE 30 MG: 10 INJECTION INTRAVENOUS at 09:30

## 2020-03-18 RX ADMIN — PROPOFOL 25 MCG/KG/MIN: 10 INJECTION, EMULSION INTRAVENOUS at 09:34

## 2020-03-18 RX ADMIN — SUCCINYLCHOLINE CHLORIDE 100 MG: 20 INJECTION, SOLUTION INTRAMUSCULAR; INTRAVENOUS; PARENTERAL at 09:26

## 2020-03-18 RX ADMIN — FENTANYL CITRATE 50 MCG: 50 INJECTION, SOLUTION INTRAMUSCULAR; INTRAVENOUS at 12:35

## 2020-03-18 RX ADMIN — ONDANSETRON 4 MG: 2 INJECTION INTRAMUSCULAR; INTRAVENOUS at 12:25

## 2020-03-18 RX ADMIN — PROPOFOL 200 MG: 10 INJECTION, EMULSION INTRAVENOUS at 09:18

## 2020-03-18 RX ADMIN — HYDROXYZINE HYDROCHLORIDE 50 MG: 50 INJECTION, SOLUTION INTRAMUSCULAR at 14:18

## 2020-03-18 RX ADMIN — PHENYLEPHRINE HYDROCHLORIDE 100 MCG: 10 INJECTION INTRAVENOUS at 11:30

## 2020-03-18 RX ADMIN — LIDOCAINE HYDROCHLORIDE 100 MG: 20 INJECTION, SOLUTION INFILTRATION; PERINEURAL at 09:26

## 2020-03-18 RX ADMIN — PHENYLEPHRINE HYDROCHLORIDE 100 MCG: 10 INJECTION INTRAVENOUS at 12:25

## 2020-03-18 ASSESSMENT — MIFFLIN-ST. JEOR: SCORE: 2289.03

## 2020-03-18 NOTE — ANESTHESIA POSTPROCEDURE EVALUATION
Patient: Mónica Aly    Procedure(s):  LAPAROSCOPIC CHOLECYSTECTOMY    Diagnosis:Cholelithiasis [K80.20]  Diagnosis Additional Information: No value filed.    Anesthesia Type:  General    Note:  Anesthesia Post Evaluation    Patient location during evaluation: PACU  Patient participation: Able to fully participate in evaluation  Level of consciousness: awake  Pain management: adequate  Airway patency: patent  Cardiovascular status: acceptable  Respiratory status: acceptable  Hydration status: acceptable  PONV: none             Last vitals:  Vitals:    03/18/20 1330 03/18/20 1340 03/18/20 1345   BP: 109/57  106/60   Pulse: 86  90   Resp: 21 24 20   Temp: 36.5  C (97.7  F)     SpO2: 100% 96% 93%         Electronically Signed By: Jaime Gallagher MD  March 18, 2020  2:00 PM

## 2020-03-18 NOTE — OR NURSING
Assumed cares from Lurdes SINGH RN  Spoke to Dr. Jaime Gallagher re pain status 8/10.  VO to give 1000 PO Acetaminophen and done.

## 2020-03-18 NOTE — OR NURSING
Patient tearful on admission.  States her parents really wanted to be with her.  She did very well with the admission.  Discovered menses started today.    Because surgery is going longer than anticipated, her father was called with updates now.  Will keep them updated with status.

## 2020-03-18 NOTE — ANESTHESIA CARE TRANSFER NOTE
Patient: Mónica Aly    Procedure(s):  LAPAROSCOPIC CHOLECYSTECTOMY    Diagnosis: Cholelithiasis [K80.20]  Diagnosis Additional Information: No value filed.    Anesthesia Type:   General     Note:  Airway :Face Mask  Patient transferred to:PACU  Comments: Pt awake and able to verbalize needs. ALL monitors on and audible. Vital signs stable. Spontaneous respiration without difficulty. o2 sats 98% on 10Lo2 per face mask. Pt denies pain. Report given to PACU RN.Handoff Report: Identifed the Patient, Identified the Reponsible Provider, Reviewed the pertinent medical history, Discussed the surgical course, Reviewed Intra-OP anesthesia mangement and issues during anesthesia, Set expectations for post-procedure period and Allowed opportunity for questions and acknowledgement of understanding      Vitals: (Last set prior to Anesthesia Care Transfer)    CRNA VITALS  3/18/2020 1233 - 3/18/2020 1333      3/18/2020             Pulse:  97    SpO2:  100 %    Resp Rate (set):  10                Electronically Signed By: ALEX Gillespie CRNA  March 18, 2020  1:38 PM

## 2020-03-18 NOTE — OP NOTE
Surgeon: Jasper Becker MD  1st Assistant: Beti Bran PA-C, The physicians assistant was medically necessary for their expertise in camera management, suctioning, suturing, and retraction.  PREOPERATIVE DIAGNOSIS: acute on chronic cholecystitis.   POSTOPERATIVE DIAGNOSES: Same with hydrops and intrahepatic gallbladder  PROCEDURE: Laparoscopic cholecystectomy (D3 due to intrahepatic and scared down nature of the gallbladder)  ANESTHESIA: General.   ESTIMATED BLOOD LOSS: Less than 50 mL.   OPERATIVE PROCEDURE: After induction of general endotracheal anesthesia, [facility] abdomen was prepped and draped in the usual sterile fashion. With the Wilfrid technique in an periumbilical location, the abdomen was entered and pneumoperitoneum was established. Three additional 5 mm trocars were placed along the right hypochondrium.  Adhesions of the omentum to the gallbladder were taken down.  The gallbladder was very inflamed and thickened and significantly intrahepatic.  The gallbladder was decompressed in order to grasp it properly.  We then retracted the gallbladder cephalad and lateral.  We then try to dissect the triangle of Calot, disprove quite difficult exam due to inflammation and intrahepatic nature of the gallbladder.  At this point we start taking the gallbladder top-down and creating separation between the gallbladder and the liver safely.  Eventually we encounter what appeared to be the cystic artery leading solely into the gallbladder.  This was clipped.  We then surrounded the infundibulum of the gallbladder as best as possible.  We utilized a linear stapler to control some of the thickened peritoneum surrounding the infundibulum.  We then completely evacuated the gallstones from the gallbladder, from the inside of the gallbladder we then sutured the infundibulum closed with running 2-0 Vicryl suture.  No evidence of bile leak is identified.  The gallstones and and gallbladder were removed from the  patient's abdomen and sent to pathology.  The gallbladder fossa was explored no evidence of bleeding noted, no bile leak noted.  Evicel material was applied to the dissection bed and the infundibular ligation.  The abdomen was surveyed and no other pathology seen.  The epigastric and supraumbilical trochars which were 12 mm in size were closed with Bryan-Gilberto device using 0 Vicryl suture.  The remaining trocars were then removed under direct visualization without evidence of bleeding. Pneumoperitoneum was released.  Skin was approximated with 4-0 Monocryl. Steri-Strips and sterile dressing applied. No immediate complications.   TIFF YU MD

## 2020-03-18 NOTE — DISCHARGE INSTRUCTIONS
Today you received Toradol, an antiinflammatory medication similar to Ibuprofen.  You should not take other antiinflammatory medication, such as Ibuprofen, Motrin, Advil, Aleve, Naprosyn, etc until 7:00PM.       [facility]  Discharge Instructions: Post-Operative Laparoscopic Cholecystectomy    ACTIVITY    Expect to feel tired after your surgery.  This will gradually resolve.      Take frequent, short walks and increase your activity gradually.      Avoid strenuous physical activity or heavy lifting greater than 15-20 lbs. for 2-3 weeks.  You may climb stairs.    You may drive without restrictions when you are not using any prescription pain medication and feel comfortable in a car.    You may return to work/school when you are comfortable without any prescription pain medication.    WOUND CARE    You may remove your outer dressing or Band-Aids and shower 48 hours after the surgery.  Pat your incisions dry and leave them open to air.  Re-apply dressing (Band-Aids or gauze/tape) as needed for comfort or drainage.    You have steri-strips (looks like white tape) on your incision.  You may peel off the steri-strips 2 weeks after your surgery if they have not peeled off on their own.     Do not soak your incisions in a tub or pool for 2 weeks.     Do not apply any lotions, creams, or ointments to your incisions.    A ridge under your incisions is normal and will gradually resolve.    DIET    Start with liquids, then gradually resume your regular diet as tolerated.  Avoid heavy, spicy, and greasy meals for 2-3 days.    Drink plenty of fluids to stay hydrated.    It is not uncommon to experience some loose stools or diarrhea after surgery.  This is your body s way of adapting to the bile which will slowly drain into your intestine.  A low fat diet may help with this.  This should improve over 1-2 months.    PAIN    Expect some tenderness and discomfort at the incision sites.  Use the  prescribed pain medication at your discretion.  Expect gradual resolution of your pain over several days.    You may take ibuprofen with food (unless you have been told not to) instead of or in addition to your prescribed pain medication.  If you are taking Norco or Percocet, do not take any additional acetaminophen/APAP/Tylenol.    Do not drink alcohol or drive while you are taking pain medications.    You may apply ice to your incisions in 20 minute intervals as needed for the next 48 hours.  After that time, consider switching to heat if you prefer.    EXPECTATIONS    Pain medications can cause constipation.  Limit use when possible.  Take over the counter stool softener/stimulant, such as Colace or Senna, 1-2 times a day with plenty of water.  You may take a mild over the counter laxative, such as Miralax or a suppository, as needed.  You may discontinue these medications once you are having regular bowel movements and/or are no longer taking your narcotic pain medication.       You may have shoulder or upper back discomfort due to the gas used in surgery.  This is temporary and should resolve in 48-72 hours.  Short, frequent walks may help with this.    FOLLOW UP    Our office will contact you in approximately 2 weeks to check on your progress and answer any questions you may have.  If you are doing well, you will not need to return for a follow up appointment.  If any concerns are identified over the phone, we will help you make an appointment to see a provider.     If you have not received a phone call, have any questions or concerns, or would like to be seen, please call us at [phone] and ask to speak with our nurse.  We are located at [address] [place] [address].    CALL OUR OFFICE -[phone] IF YOU HAVE:     Chills or fever above 101 F.    Increased redness, warmth, or drainage at your incisions.    Significant bleeding.    Pain not relieved by your pain medication or  rest.    Increasing pain after the first 48 hours.    Any other concerns or questions.    Same Day Surgery Discharge Instructions for  Sedation and General Anesthesia       It's not unusual to feel dizzy, light-headed or faint for up to 24 hours after surgery or while taking pain medication.  If you have these symptoms: sit for a few minutes before standing and have someone assist you when you get up to walk or use the bathroom.      You should rest and relax for the next 24 hours. We recommend you make arrangements to have an adult stay with you for at least 24 hours after your discharge.  Avoid hazardous and strenuous activity.      DO NOT DRIVE any vehicle or operate mechanical equipment for 24 hours following the end of your surgery.  Even though you may feel normal, your reactions may be affected by the medication you have received.      Do not drink alcoholic beverages for 24 hours following surgery.       Slowly progress to your regular diet as you feel able. It's not unusual to feel nauseated and/or vomit after receiving anesthesia.  If you develop these symptoms, drink clear liquids (apple juice, ginger ale, broth, 7-up, etc. ) until you feel better.  If your nausea and vomiting persists for 24 hours, please notify your surgeon.        All narcotic pain medications, along with inactivity and anesthesia, can cause constipation. Drinking plenty of liquids and increasing fiber intake will help.      For any questions of a medical nature, call your surgeon.      Do not make important decisions for 24 hours.      If you had general anesthesia, you may have a sore throat for a couple of days related to the breathing tube used during surgery.  You may use Cepacol lozenges to help with this discomfort.  If it worsens or if you develop a fever, contact your surgeon.       If you feel your pain is not well managed with the pain medications prescribed by your surgeon, please contact your surgeon's office to let them  know so they can address your concerns.           Revised July 2018

## 2020-03-18 NOTE — ANESTHESIA PREPROCEDURE EVALUATION
Anesthesia Pre-Procedure Evaluation    Patient: Mónica Aly   MRN: [id number] : 1995          Preoperative Diagnosis: Cholelithiasis [K80.20]    Procedure(s):  LAPAROSCOPIC CHOLECYSTECTOMY    Past Medical History:   Diagnosis Date     Bell palsy 2014     Cholecystolithiasis      Chronic cough      Diabetes (H)      Gallstones      Hypertension      Obese      Past Surgical History:   Procedure Laterality Date     C NONSPECIFIC PROCEDURE      Tonsillectomy     ENT SURGERY      tonsillectomy     ENT SURGERY      wisdom teeth extraction       Anesthesia Evaluation     . Pt has had prior anesthetic.     No history of anesthetic complications          ROS/MED HX    ENT/Pulmonary: Comment: Cough on/off since January. - neg pulmonary ROS    (-) sleep apnea   Neurologic:  - neg neurologic ROS     Cardiovascular:  - neg cardiovascular ROS   (+) hypertension----. : . . . :. .       METS/Exercise Tolerance:     Hematologic:  - neg hematologic  ROS       Musculoskeletal:  - neg musculoskeletal ROS       GI/Hepatic:         Renal/Genitourinary:         Endo: Comment: BMI > 45 - neg endo ROS   (+) type II DM Obesity, .      Psychiatric:  - neg psychiatric ROS       Infectious Disease:         Malignancy:         Other:                          Physical Exam  Normal systems: cardiovascular and pulmonary    Airway   Mallampati: II    Dental     Cardiovascular   Rhythm and rate: regular and normal      Pulmonary    breath sounds clear to auscultation            Lab Results   Component Value Date    WBC 8.7 2020    HGB 12.4 2020    HCT 37.0 2020     2020     2020    POTASSIUM 2.8 (L) 2020    CHLORIDE 109 2020    CO2 26 2020    BUN 4 (L) 2020    CR 0.59 2020     (H) 2020    GAVIN 7.8 (L) 2020    PHOS 2.3 (L) 2019    MAG 2.4 (H) 2020    ALBUMIN 2.9 (L) 2020    PROTTOTAL 6.5 (L) 2020    ALT 18 2020     "AST 11 03/11/2020    ALKPHOS 59 03/11/2020    BILITOTAL 0.5 03/11/2020    LIPASE 62 (L) 03/10/2020    HCG Negative 03/18/2020    HCGS Negative 03/10/2020       Preop Vitals  BP Readings from Last 3 Encounters:   03/18/20 (!) 143/106   03/11/20 106/61   02/02/20 (!) 155/99    Pulse Readings from Last 3 Encounters:   05/24/19 90   08/11/15 103      Resp Readings from Last 3 Encounters:   03/18/20 18   03/11/20 16   02/02/20 20    SpO2 Readings from Last 3 Encounters:   03/18/20 99%   03/11/20 98%   02/02/20 100%      Temp Readings from Last 1 Encounters:   03/18/20 36.7  C (98.1  F) (Oral)    Ht Readings from Last 1 Encounters:   03/18/20 1.753 m (5' 9\")      Wt Readings from Last 1 Encounters:   03/18/20 147 kg (324 lb)    Estimated body mass index is 47.85 kg/m  as calculated from the following:    Height as of this encounter: 1.753 m (5' 9\").    Weight as of this encounter: 147 kg (324 lb).       Anesthesia Plan      History & Physical Review  History and physical reviewed and following examination; no interval change.    ASA Status:  3 .        Plan for General (ETT) with Intravenous induction. Maintenance will be Inhalation.    PONV prophylaxis:  Ondansetron (or other 5HT-3) and Dexamethasone or Solumedrol         Postoperative Care  Postoperative pain management:  Multi-modal analgesia and IV analgesics.      Consents  Anesthetic plan, risks, benefits and alternatives discussed with:  Patient..                 Evgeny Álvarez MD  "

## 2020-03-20 ENCOUNTER — TELEPHONE (OUTPATIENT)
Dept: SURGERY | Facility: CLINIC | Age: 25
End: 2020-03-20

## 2020-03-20 LAB — COPATH REPORT: NORMAL

## 2020-03-20 RX ORDER — OXYCODONE HYDROCHLORIDE 5 MG/1
5 TABLET ORAL EVERY 6 HOURS PRN
Qty: 30 TABLET | Refills: 0 | Status: SHIPPED | OUTPATIENT
Start: 2020-03-20 | End: 2022-11-15

## 2020-03-20 RX ORDER — IBUPROFEN 600 MG/1
600 TABLET, FILM COATED ORAL EVERY 6 HOURS PRN
Qty: 45 TABLET | Refills: 1 | Status: SHIPPED | OUTPATIENT
Start: 2020-03-20

## 2020-03-20 NOTE — TELEPHONE ENCOUNTER
Procedure: Laparoscopic cholecystectomy   Date: 3/18/2020  Surgeon: Eugenio    Patient calling requesting pain medication. She has been alternating oxycodone and tylenol, but reports she is almost out of oxycodone, and tylenol alone is not relieving the pain.      Patient seems tearful on phone call.    She has not been taking ibuprofen.     Recommended that she start taking 600 mg of ibuprofen every 4-6 hours to help with swelling.    No bowel movement, but passing flatus.    Spoke with Dr. Becker, who will send rx to her pharmacy.    She knows to call over the weekend with worsening symptoms to discuss with on call surgeon.    Radha Palacios, YAMILE-BSN

## 2020-04-06 ENCOUNTER — TELEPHONE (OUTPATIENT)
Dept: SURGERY | Facility: CLINIC | Age: 25
End: 2020-04-06

## 2020-04-06 NOTE — TELEPHONE ENCOUNTER
[facility]  Post op call note  April 6, 2020       Mónica Aly was called for an update regarding her recovery.  There was no answer and a message was left encouraging her to call us back with any questions, concerns, or to provide an update.        Beti Bran PA-C  [facility]  [phone]

## 2020-11-16 ENCOUNTER — VIRTUAL VISIT (OUTPATIENT)
Dept: FAMILY MEDICINE | Facility: OTHER | Age: 25
End: 2020-11-16

## 2020-11-16 NOTE — PROGRESS NOTES
"Date: 2020 13:27:49  Clinician: Ozzy Lange  Clinician NPI: [id number]  Patient: Mónica Aly  Patient : 1995  Patient Address:  [address] [address], [place] [address]  Patient Phone: [phone]  Visit Protocol: URI  Patient Summary:  Mónica is a 25 year old ( : 1995 ) female who initiated a [facility] Visit for COVID-19 (Coronavirus) evaluation and screening. When asked the question \"Please sign me up to receive news, health information and promotions from [facility].\", Mónica responded \"No\".    Mónica states her symptoms started 1-2 days ago.   Her symptoms consist of malaise, a sore throat, wheezing, and a cough.   Symptom details     Cough: Mónica coughs every 5-10 minutes and her cough is more bothersome at night. Phlegm does not come into her throat when she coughs. She does not believe her cough is caused by post-nasal drip.     Sore throat: Mónica reports having mild throat pain (1-3 on a 10 point pain scale), does not have exudate on her tonsils, and can swallow liquids. She is not sure if the lymph nodes in her neck are enlarged. A rash has not appeared on the skin since the sore throat started.     Wheezing: Mónica has not ever been diagnosed with asthma. Additional wheezing details as reported by the patient (free text): It hasn't affected my daily activity. I only ever notice it when I am in silence.        Mónica denies having vomiting, rhinitis, facial pain or pressure, myalgias, chills, teeth pain, ageusia, diarrhea, ear pain, headache, fever, nasal congestion, nausea, and anosmia. She also denies taking antibiotic medication in the past month and having recent facial or sinus surgery in the past 60 days. She is not experiencing dyspnea.   Precipitating events  Within the past week, Mónica has not been exposed to someone with strep throat. She has not recently been exposed to someone with influenza. Mónica has been in close contact with the following high risk individuals: " people with asthma, heart disease or diabetes.   Pertinent COVID-19 (Coronavirus) information  Mónica does not work or volunteer as healthcare worker or a . In the past 14 days, Mónica has not worked or volunteered at a healthcare facility or group living setting.   In the past 14 days, she also has not lived in a congregate living setting.   Mónica has not had a close contact with a laboratory-confirmed COVID-19 patient within 14 days of symptom onset.    Since December 2019, Mónica has not been tested for COVID-19 and has had upper respiratory infection (URI) or influenza-like illness.      Date(s) of previous URI or influenza-like illness (free-text): 1/20/2020-2/18/2020     Symptoms Mónica experienced during previous URI or influenza-like illness as reported by the patient (free-text): Severe cough, difficulty breathing, fever, nausea        Pertinent medical history  Mónica typically gets a yeast infection when she takes antibiotics. She has used fluconazole (Diflucan) to treat previous yeast infections. 1 dose of fluconazole (Diflucan) has typically been sufficient for symptoms to resolve in the past.   Mónica needs a return to work/school note.   Weight: 330 lbs   Mónica does not smoke or use smokeless tobacco.   She denies pregnancy and denies breastfeeding. She has menstruated in the past month.   Additional information as reported by the patient (free text): Type 2 diabetic well controlled. High blood pressure. Obesity. Low vit D levels. Low potassium level   Weight: 330 lbs    MEDICATIONS: vitamin D3-vitamin K-berberine-hops oral, potassium chloride oral, lisinopril oral, amlodipine besylate (bulk), ALLERGIES: NKDA  Clinician Response:  Dear Mónica,   Your symptoms show that you may have coronavirus (COVID-19). This illness can cause fever, cough and trouble breathing. Many people get a mild case and get better on their own. Some people can get very sick.  What should I do?  We  "would like to test you for this virus.   1. Please call [phone] to schedule your visit. Explain that you were referred by [facility] to have a COVID-19 test. Be ready to share your [facility] visit ID number.  * If you need to schedule in [facility] please call [phone] or for [employer] employees please call [phone].  * If you need to schedule in the [place] area please call [phone]. [place] employees call [phone].  The following will serve as your written order for this COVID Test, ordered by me, for the indication of suspected COVID [Z20.828]: The test will be ordered in [employer], our electronic health record, after you are scheduled. It will show as ordered and authorized by Nic Nieves MD.  Order: COVID-19 (Coronavirus) PCR for SYMPTOMATIC testing from [facility].   2. When it's time for your COVID test:  Stay at least 6 feet away from others. (If someone will drive you to your test, stay in the backseat, as far away from the  as you can.)   Cover your mouth and nose with a mask, tissue or washcloth.  Go straight to the testing site. Don't make any stops on the way there or back.      3.Starting now: Stay home and away from others (self-isolate) until:   You've had no fever---and no medicine that reduces fever---for one full day (24 hours). And...   Your other symptoms have gotten better. For example, your cough or breathing has improved. And...   At least 10 days have passed since your symptoms started.       During this time, don't leave the house except for testing or medical care.   Stay in your own room, even for meals. Use your own bathroom if you can.   Stay away from others in your home. No hugging, kissing or shaking hands. No visitors.  Don't go to work, school or anywhere else.    Clean \"high touch\" surfaces often (doorknobs, counters, handles, etc.). Use a household cleaning spray or wipes. You'll find a full list of  on the EPA website: " www.epa.gov/pesticide-registration/list-n-disinfectants-use-against-sars-cov-2.   Cover your mouth and nose with a mask, tissue or washcloth to avoid spreading germs.  Wash your hands and face often. Use soap and water.  Caregivers in these groups are at risk for severe illness due to COVID-19:  o People 65 years and older  o People who live in a nursing home or long-term care facility  o People with chronic disease (lung, heart, cancer, diabetes, kidney, liver, immunologic)  o People who have a weakened immune system, including those who:   Are in cancer treatment  Take medicine that weakens the immune system, such as corticosteroids  Had a bone marrow or organ transplant  Have an immune deficiency  Have poorly controlled HIV or AIDS  Are obese (body mass index of 40 or higher)  Smoke regularly   o Caregivers should wear gloves while washing dishes, handling laundry and cleaning bedrooms and bathrooms.  o Use caution when washing and drying laundry: Don't shake dirty laundry, and use the warmest water setting that you can.  o For more tips, go to www.cdc.gov/coronavirus/2019-ncov/downloads/10Things.pdf.    4.Sign up for [employer]. We know it's scary to hear that you might have COVID-19. We want to track your symptoms to make sure you're okay over the next 2 weeks. Please look for an email from [employer]---this is a free, online program that we'll use to keep in touch. To sign up, follow the link in the email. Learn more at [email or web address]  How can I take care of myself?   Get lots of rest. Drink extra fluids (unless a doctor has told you not to).   Take Tylenol (acetaminophen) for fever or pain. If you have liver or kidney problems, ask your family doctor if it's okay to take Tylenol.   Adults can take either:    650 mg (two 325 mg pills) every 4 to 6 hours, or...   1,000 mg (two 500 mg pills) every 8 hours as needed.    Note: Don't take more than 3,000 mg in one day. Acetaminophen is found  in many medicines (both prescribed and over-the-counter medicines). Read all labels to be sure you don't take too much.   For children, check the Tylenol bottle for the right dose. The dose is based on the child's age or weight.    If you have other health problems (like cancer, heart failure, an organ transplant or severe kidney disease): Call your specialty clinic if you don't feel better in the next 2 days.       Know when to call 911. Emergency warning signs include:    Trouble breathing or shortness of breath Pain or pressure in the chest that doesn't go away Feeling confused like you haven't felt before, or not being able to wake up Bluish-colored lips or face.  Where can I get more information?    [employer] -- About COVID-19: [email or web address]   CDC -- What to Do If You're Sick: www.cdc.gov/coronavirus/2019-ncov/about/steps-when-sick.html   [facility] -- Ending Home Isolation: www.cdc.gov/coronavirus/2019-ncov/hcp/disposition-in-home-patients.html   CDC -- Caring for Someone: www.cdc.gov/coronavirus/2019-ncov/if-you-are-sick/care-for-someone.html   [facility] -- Interim Guidance for Hospital Discharge to Home: www.[facility].[place]/diseases/coronavirus/hcp/hospdischarge.pdf   [facility] clinical trials (COVID-19 research studies): clinicalaffairs.[facility]-clinical-trials    Below are the COVID-19 hotlines at the [facility] ([facility]). Interpreters are available.    For health questions: Call 425-828-8606 or 1-982.958.7104 (7 a.m. to 7 p.m.) For questions about schools and childcare: Call 118-334-3540 or 1-532.138.5528 (7 a.m. to 7 p.m.)    Diagnosis: Contact with and (suspected) exposure to other viral communicable diseases  Diagnosis ICD: Z20.828

## 2020-11-18 DIAGNOSIS — Z20.822 SUSPECTED COVID-19 VIRUS INFECTION: Primary | ICD-10-CM

## 2020-11-18 DIAGNOSIS — Z20.822 SUSPECTED COVID-19 VIRUS INFECTION: ICD-10-CM

## 2020-11-18 DIAGNOSIS — Z20.822 SUSPECTED 2019 NOVEL CORONAVIRUS INFECTION: Primary | ICD-10-CM

## 2020-11-18 PROCEDURE — U0003 INFECTIOUS AGENT DETECTION BY NUCLEIC ACID (DNA OR RNA); SEVERE ACUTE RESPIRATORY SYNDROME CORONAVIRUS 2 (SARS-COV-2) (CORONAVIRUS DISEASE [COVID-19]), AMPLIFIED PROBE TECHNIQUE, MAKING USE OF HIGH THROUGHPUT TECHNOLOGIES AS DESCRIBED BY CMS-2020-01-R: HCPCS | Performed by: FAMILY MEDICINE

## 2020-11-19 LAB
SARS-COV-2 RNA SPEC QL NAA+PROBE: NOT DETECTED
SPECIMEN SOURCE: NORMAL

## 2020-11-22 ENCOUNTER — HEALTH MAINTENANCE LETTER (OUTPATIENT)
Age: 25
End: 2020-11-22

## 2021-04-10 ENCOUNTER — HEALTH MAINTENANCE LETTER (OUTPATIENT)
Age: 26
End: 2021-04-10

## 2021-07-25 ENCOUNTER — HEALTH MAINTENANCE LETTER (OUTPATIENT)
Age: 26
End: 2021-07-25

## 2021-09-19 ENCOUNTER — HEALTH MAINTENANCE LETTER (OUTPATIENT)
Age: 26
End: 2021-09-19

## 2021-11-14 ENCOUNTER — HEALTH MAINTENANCE LETTER (OUTPATIENT)
Age: 26
End: 2021-11-14

## 2021-11-15 LAB
ALBUMIN SERPL-MCNC: 3.5 G/DL (ref 3.4–5)
ALP SERPL-CCNC: 85 U/L (ref 40–150)
ALT SERPL W P-5'-P-CCNC: 22 U/L (ref 0–50)
ANION GAP SERPL CALCULATED.3IONS-SCNC: 7 MMOL/L (ref 3–14)
AST SERPL W P-5'-P-CCNC: 16 U/L (ref 0–45)
BASOPHILS # BLD AUTO: 0 10E3/UL (ref 0–0.2)
BASOPHILS NFR BLD AUTO: 0 %
BILIRUB SERPL-MCNC: 0.2 MG/DL (ref 0.2–1.3)
BUN SERPL-MCNC: 9 MG/DL (ref 7–30)
CALCIUM SERPL-MCNC: 8.7 MG/DL (ref 8.5–10.1)
CHLORIDE BLD-SCNC: 103 MMOL/L (ref 94–109)
CO2 SERPL-SCNC: 28 MMOL/L (ref 20–32)
CREAT SERPL-MCNC: 0.66 MG/DL (ref 0.52–1.04)
EOSINOPHIL # BLD AUTO: 0.2 10E3/UL (ref 0–0.7)
EOSINOPHIL NFR BLD AUTO: 2 %
ERYTHROCYTE [DISTWIDTH] IN BLOOD BY AUTOMATED COUNT: 12.9 % (ref 10–15)
GFR SERPL CREATININE-BSD FRML MDRD: >90 ML/MIN/1.73M2
GLUCOSE BLD-MCNC: 136 MG/DL (ref 70–99)
GLUCOSE BLDC GLUCOMTR-MCNC: 132 MG/DL (ref 70–99)
HCT VFR BLD AUTO: 40.4 % (ref 35–47)
HGB BLD-MCNC: 13.7 G/DL (ref 11.7–15.7)
HOLD SPECIMEN: NORMAL
IMM GRANULOCYTES # BLD: 0 10E3/UL
IMM GRANULOCYTES NFR BLD: 0 %
LYMPHOCYTES # BLD AUTO: 2.5 10E3/UL (ref 0.8–5.3)
LYMPHOCYTES NFR BLD AUTO: 34 %
MCH RBC QN AUTO: 28.1 PG (ref 26.5–33)
MCHC RBC AUTO-ENTMCNC: 33.9 G/DL (ref 31.5–36.5)
MCV RBC AUTO: 83 FL (ref 78–100)
MONOCYTES # BLD AUTO: 0.4 10E3/UL (ref 0–1.3)
MONOCYTES NFR BLD AUTO: 6 %
NEUTROPHILS # BLD AUTO: 4.2 10E3/UL (ref 1.6–8.3)
NEUTROPHILS NFR BLD AUTO: 58 %
NRBC # BLD AUTO: 0 10E3/UL
NRBC BLD AUTO-RTO: 0 /100
PLATELET # BLD AUTO: 322 10E3/UL (ref 150–450)
POTASSIUM BLD-SCNC: 2.7 MMOL/L (ref 3.4–5.3)
PROT SERPL-MCNC: 7.6 G/DL (ref 6.8–8.8)
RBC # BLD AUTO: 4.87 10E6/UL (ref 3.8–5.2)
SODIUM SERPL-SCNC: 138 MMOL/L (ref 133–144)
WBC # BLD AUTO: 7.4 10E3/UL (ref 4–11)

## 2021-11-15 PROCEDURE — 80053 COMPREHEN METABOLIC PANEL: CPT | Performed by: EMERGENCY MEDICINE

## 2021-11-15 PROCEDURE — 83735 ASSAY OF MAGNESIUM: CPT | Performed by: EMERGENCY MEDICINE

## 2021-11-15 PROCEDURE — 36415 COLL VENOUS BLD VENIPUNCTURE: CPT | Performed by: EMERGENCY MEDICINE

## 2021-11-15 PROCEDURE — 85025 COMPLETE CBC W/AUTO DIFF WBC: CPT | Performed by: EMERGENCY MEDICINE

## 2021-11-15 PROCEDURE — 84703 CHORIONIC GONADOTROPIN ASSAY: CPT | Performed by: EMERGENCY MEDICINE

## 2021-11-15 PROCEDURE — 83036 HEMOGLOBIN GLYCOSYLATED A1C: CPT | Performed by: EMERGENCY MEDICINE

## 2021-11-15 PROCEDURE — 99285 EMERGENCY DEPT VISIT HI MDM: CPT | Mod: 25

## 2021-11-15 PROCEDURE — 93005 ELECTROCARDIOGRAM TRACING: CPT

## 2021-11-16 ENCOUNTER — APPOINTMENT (OUTPATIENT)
Dept: CT IMAGING | Facility: CLINIC | Age: 26
End: 2021-11-16
Attending: EMERGENCY MEDICINE
Payer: COMMERCIAL

## 2021-11-16 ENCOUNTER — HOSPITAL ENCOUNTER (EMERGENCY)
Facility: CLINIC | Age: 26
Discharge: HOME | End: 2021-11-16
Attending: EMERGENCY MEDICINE | Admitting: EMERGENCY MEDICINE
Payer: COMMERCIAL

## 2021-11-16 VITALS
OXYGEN SATURATION: 100 % | RESPIRATION RATE: 16 BRPM | HEART RATE: 100 BPM | SYSTOLIC BLOOD PRESSURE: 150 MMHG | TEMPERATURE: 98.4 F | DIASTOLIC BLOOD PRESSURE: 100 MMHG

## 2021-11-16 DIAGNOSIS — E11.9 TYPE 2 DIABETES MELLITUS WITHOUT COMPLICATION, UNSPECIFIED WHETHER LONG TERM INSULIN USE (H): ICD-10-CM

## 2021-11-16 DIAGNOSIS — Z91.148 NONCOMPLIANCE WITH MEDICATION REGIMEN: ICD-10-CM

## 2021-11-16 DIAGNOSIS — R51.9 ACUTE NONINTRACTABLE HEADACHE, UNSPECIFIED HEADACHE TYPE: ICD-10-CM

## 2021-11-16 DIAGNOSIS — E87.6 HYPOKALEMIA: ICD-10-CM

## 2021-11-16 DIAGNOSIS — I10 HYPERTENSION, UNSPECIFIED TYPE: ICD-10-CM

## 2021-11-16 LAB
ATRIAL RATE - MUSE: 99 BPM
DIASTOLIC BLOOD PRESSURE - MUSE: NORMAL MMHG
HBA1C MFR BLD: 5.5 % (ref 0–5.6)
HCG SERPL QL: NEGATIVE
INTERPRETATION ECG - MUSE: NORMAL
MAGNESIUM SERPL-MCNC: 1.9 MG/DL (ref 1.6–2.3)
P AXIS - MUSE: 28 DEGREES
POTASSIUM BLD-SCNC: 5 MMOL/L (ref 3.4–5.3)
PR INTERVAL - MUSE: 142 MS
QRS DURATION - MUSE: 88 MS
QT - MUSE: 350 MS
QTC - MUSE: 449 MS
R AXIS - MUSE: -9 DEGREES
SYSTOLIC BLOOD PRESSURE - MUSE: NORMAL MMHG
T AXIS - MUSE: 27 DEGREES
VENTRICULAR RATE- MUSE: 99 BPM

## 2021-11-16 PROCEDURE — 250N000013 HC RX MED GY IP 250 OP 250 PS 637: Performed by: EMERGENCY MEDICINE

## 2021-11-16 PROCEDURE — 36415 COLL VENOUS BLD VENIPUNCTURE: CPT | Performed by: EMERGENCY MEDICINE

## 2021-11-16 PROCEDURE — 250N000011 HC RX IP 250 OP 636: Performed by: EMERGENCY MEDICINE

## 2021-11-16 PROCEDURE — 70450 CT HEAD/BRAIN W/O DYE: CPT

## 2021-11-16 PROCEDURE — 84132 ASSAY OF SERUM POTASSIUM: CPT | Performed by: EMERGENCY MEDICINE

## 2021-11-16 PROCEDURE — 96375 TX/PRO/DX INJ NEW DRUG ADDON: CPT

## 2021-11-16 PROCEDURE — 96361 HYDRATE IV INFUSION ADD-ON: CPT

## 2021-11-16 PROCEDURE — 258N000003 HC RX IP 258 OP 636: Performed by: EMERGENCY MEDICINE

## 2021-11-16 PROCEDURE — 96374 THER/PROPH/DIAG INJ IV PUSH: CPT

## 2021-11-16 RX ORDER — POTASSIUM CHLORIDE 1.5 G/1.58G
40 POWDER, FOR SOLUTION ORAL ONCE
Status: COMPLETED | OUTPATIENT
Start: 2021-11-16 | End: 2021-11-16

## 2021-11-16 RX ORDER — POTASSIUM CHLORIDE 1500 MG/1
40 TABLET, EXTENDED RELEASE ORAL ONCE
Status: DISCONTINUED | OUTPATIENT
Start: 2021-11-16 | End: 2021-11-16

## 2021-11-16 RX ORDER — DIPHENHYDRAMINE HYDROCHLORIDE 50 MG/ML
25 INJECTION INTRAMUSCULAR; INTRAVENOUS ONCE
Status: COMPLETED | OUTPATIENT
Start: 2021-11-16 | End: 2021-11-16

## 2021-11-16 RX ORDER — METOCLOPRAMIDE HYDROCHLORIDE 5 MG/ML
10 INJECTION INTRAMUSCULAR; INTRAVENOUS ONCE
Status: COMPLETED | OUTPATIENT
Start: 2021-11-16 | End: 2021-11-16

## 2021-11-16 RX ORDER — HYDRALAZINE HYDROCHLORIDE 20 MG/ML
5 INJECTION INTRAMUSCULAR; INTRAVENOUS ONCE
Status: COMPLETED | OUTPATIENT
Start: 2021-11-16 | End: 2021-11-16

## 2021-11-16 RX ORDER — SODIUM CHLORIDE 9 MG/ML
INJECTION, SOLUTION INTRAVENOUS CONTINUOUS
Status: DISCONTINUED | OUTPATIENT
Start: 2021-11-16 | End: 2021-11-16 | Stop reason: HOSPADM

## 2021-11-16 RX ORDER — POTASSIUM CHLORIDE 1.5 G/1.58G
20 POWDER, FOR SOLUTION ORAL 2 TIMES DAILY
Qty: 14 PACKET | Refills: 0 | Status: SHIPPED | OUTPATIENT
Start: 2021-11-16 | End: 2021-11-23

## 2021-11-16 RX ADMIN — POTASSIUM CHLORIDE 40 MEQ: 1.5 POWDER, FOR SOLUTION ORAL at 01:27

## 2021-11-16 RX ADMIN — POTASSIUM CHLORIDE 40 MEQ: 1.5 POWDER, FOR SOLUTION ORAL at 02:49

## 2021-11-16 RX ADMIN — SODIUM CHLORIDE 1000 ML: 9 INJECTION, SOLUTION INTRAVENOUS at 01:23

## 2021-11-16 RX ADMIN — METOCLOPRAMIDE 10 MG: 5 INJECTION, SOLUTION INTRAMUSCULAR; INTRAVENOUS at 01:25

## 2021-11-16 RX ADMIN — DIPHENHYDRAMINE HYDROCHLORIDE 25 MG: 50 INJECTION, SOLUTION INTRAMUSCULAR; INTRAVENOUS at 01:26

## 2021-11-16 RX ADMIN — HYDRALAZINE HYDROCHLORIDE 5 MG: 20 INJECTION INTRAMUSCULAR; INTRAVENOUS at 01:30

## 2021-11-16 NOTE — ED NOTES
Patient refused to have another lab draw she is willing to be discharged without a recheck of potassium. She will discharge directions and follow up with primary doctor.

## 2021-11-16 NOTE — ED PROVIDER NOTES
History     Chief Complaint:    Headache       HPI   Mónica Aly is a 26 year old female w/ a PMH sig for hypertension, type 2 diabetes, migraines who presents to the ED via self w/ a cc of severe nonradiating pulsating by temporal headaches onset approximately 3 to 4 months ago and worse in the last 3 to 4 days.  Patient reports her headaches are worse with light, alleviated at times with Tylenol and more severe than her typical headaches.  She reports intermittent blurry vision but denies focal weakness, vertigo.  She reports associated nausea and vomiting but denies abdominal pain, changes in urination, changes in bowel movements, fevers, chills, chest pain.  The patient reports that she has been noncompliant with her medications over the last multiple months including her diabetes and her antihypertensives.  She reports taking her lisinopril and amlodipine tonight prior to arrival in the emergency department.    Allergies:  No Known Allergies      Allergies   Allergen Reactions     No Known Allergies        Medications:    potassium chloride (KLOR-CON) 20 MEQ packet  acetaminophen (TYLENOL) 325 MG tablet  alcohol swab prep pads  amLODIPine (NORVASC) 10 MG tablet  blood glucose (NO BRAND SPECIFIED) test strip  blood glucose calibration (NO BRAND SPECIFIED) solution  blood glucose monitoring (NO BRAND SPECIFIED) meter device kit  clindamycin (CLEOCIN-T) 1 % external lotion  dapagliflozin (FARXIGA) 5 MG TABS tablet  exenatide ER (BYDUREON) 2 MG pen  ibuprofen (ADVIL/MOTRIN) 600 MG tablet  insulin aspart (NOVOLOG PEN) 100 UNIT/ML pen  insulin glargine (LANTUS VIAL) 100 UNIT/ML vial  insulin pen needle (31G X 8 MM) 31G X 8 MM miscellaneous  lisinopril (PRINIVIL/ZESTRIL) 40 MG tablet  LORazepam (ATIVAN) 0.5 MG tablet  metFORMIN (GLUCOPHAGE) 500 MG tablet  ondansetron (ZOFRAN-ODT) 4 MG ODT tab  oxyCODONE (ROXICODONE) 5 MG tablet  oxyCODONE (ROXICODONE) 5 MG tablet  potassium chloride ER (KLOR-CON M) 10 MEQ CR  tablet  SENNA-docusate sodium (SENNA S) 8.6-50 MG tablet  thin (NO BRAND SPECIFIED) lancets  vitamin D2 (ERGOCALCIFEROL) 99622 units (1250 mcg) capsule        Past Medical History:    Past Medical History:   Diagnosis Date     Bell palsy April 2014     Cholecystolithiasis      Chronic cough      Diabetes (H)      Gallstones      Hypertension      Obese        Patient Active Problem List    Diagnosis Date Noted     Cholelithiasis 03/12/2020     Priority: Medium     Added automatically from request for surgery [id number]       Nausea and vomiting 03/11/2020     Priority: Medium     Diabetic keto-acidosis (H) 05/21/2019     Priority: Medium     Bell palsy 04/21/2014     Priority: Medium     Allergic rhinitis 11/29/2004     Priority: Medium     Problem list name updated by automated process. Provider to review       Acute suppurative otitis media without spontaneous rupture of ear drum 01/31/2004     Priority: Medium     Problem list name updated by automated process. Provider to review          Past Surgical History:    Past Surgical History:   Procedure Laterality Date     ENT SURGERY      tonsillectomy     ENT SURGERY      wisdom teeth extraction     LAPAROSCOPIC CHOLECYSTECTOMY N/A 3/18/2020    Procedure: LAPAROSCOPIC CHOLECYSTECTOMY;  Surgeon: Jasper Becker MD;  Location: [facility] NONSPECIFIC PROCEDURE      Tonsillectomy        Family History:    No family history on file.    Social History:   reports that she has never smoked. She has never used smokeless tobacco. She reports that she does not drink alcohol and does not use drugs.    PCP: Ximena Og     Review of Systems  Full ROS completed and negative other than pertinent positives and negatives noted in HPI      Physical Exam     Patient Vitals for the past 24 hrs:   BP Temp Temp src Pulse Resp SpO2   11/16/21 0410 (!) 150/100 -- -- 100 -- --   11/16/21 0145 (!) 142/69 -- -- 93 -- --   11/16/21 0125 (!) 168/115 -- -- 102 -- --   11/15/21 2021 (!)  186/128 98.4  F (36.9  C) Temporal 87 16 100 %        Physical Exam    Constitutional: Well developed, mildly uncomfortable, nontox appearance  Head: Atraumatic.   Mouth/Throat: Moist mucosal membranes  Neck:  no stridor  Eyes: no scleral icterus, PERRL, EOMI  Cardiovascular: RRR, 2+ bilat radial pulses  Pulmonary/Chest: nml resp effort  Ext: Warm, well perfused, no edema  Neurological: A&O,  CNII-XII intact, nml finger to nose, 5/5 strength throughout upper and lower ext, symmetric; sensation grossly intact  Skin: Skin is warm and dry.   Psychiatric: Behavior is normal. Thought content normal.   Nursing note and vitals reviewed.        Emergency Department Course     ECG (1 : 29 : 57) 11/16/2021:  Normal sinus rhythm/atrial for LVH, no significant change in comparison to EKG dated 3/11/2020  Rate 99 bpm.   QT/QTc 350/449.   P-R-T axes 28 -92 7.   Interpreted at 0129  by Luiz Stubbs MD.     Imaging:    CT Head w/o Contrast   Final Result   IMPRESSION:   1.  Normal head CT.           Laboratory:    Results for orders placed or performed during the hospital encounter of 11/16/21   CT Head w/o Contrast     Status: None    Narrative    EXAM: CT HEAD W/O CONTRAST  LOCATION: [facility]  DATE/TIME: 11/16/2021 2:10 AM    INDICATION: Headache, intracranial hemorrhage suspected  COMPARISON: None.  TECHNIQUE: Routine CT Head without IV contrast. Multiplanar reformats. Dose reduction techniques were used.    FINDINGS:  INTRACRANIAL CONTENTS: No intracranial hemorrhage, extraaxial collection, or mass effect.  No CT evidence of acute infarct. Normal parenchymal attenuation. Normal ventricles and sulci.     VISUALIZED ORBITS/SINUSES/MASTOIDS: No intraorbital abnormality. No paranasal sinus mucosal disease. No middle ear or mastoid effusion.    BONES/SOFT TISSUES: No acute abnormality.      Impression    IMPRESSION:  1.  Normal head CT.   Glucose by meter     Status: Abnormal   Result Value Ref  Range    GLUCOSE BY METER POCT 132 (H) 70 - 99 mg/dL   Comprehensive metabolic panel     Status: Abnormal   Result Value Ref Range    Sodium 138 133 - 144 mmol/L    Potassium 2.7 (L) 3.4 - 5.3 mmol/L    Chloride 103 94 - 109 mmol/L    Carbon Dioxide (CO2) 28 20 - 32 mmol/L    Anion Gap 7 3 - 14 mmol/L    Urea Nitrogen 9 7 - 30 mg/dL    Creatinine 0.66 0.52 - 1.04 mg/dL    Calcium 8.7 8.5 - 10.1 mg/dL    Glucose 136 (H) 70 - 99 mg/dL    Alkaline Phosphatase 85 40 - 150 U/L    AST 16 0 - 45 U/L    ALT 22 0 - 50 U/L    Protein Total 7.6 6.8 - 8.8 g/dL    Albumin 3.5 3.4 - 5.0 g/dL    Bilirubin Total 0.2 0.2 - 1.3 mg/dL    GFR Estimate >90 >60 mL/min/1.73m2   Extra Blue Top Tube     Status: None   Result Value Ref Range    Hold Specimen JIC    Extra Red Top Tube     Status: None   Result Value Ref Range    Hold Specimen JIC    Extra Green Top (Lithium Heparin) Tube     Status: None   Result Value Ref Range    Hold Specimen JIC    Extra Purple Top Tube     Status: None   Result Value Ref Range    Hold Specimen JIC    CBC with platelets and differential     Status: None   Result Value Ref Range    WBC Count 7.4 4.0 - 11.0 10e3/uL    RBC Count 4.87 3.80 - 5.20 10e6/uL    Hemoglobin 13.7 11.7 - 15.7 g/dL    Hematocrit 40.4 35.0 - 47.0 %    MCV 83 78 - 100 fL    MCH 28.1 26.5 - 33.0 pg    MCHC 33.9 31.5 - 36.5 g/dL    RDW 12.9 10.0 - 15.0 %    Platelet Count 322 150 - 450 10e3/uL    % Neutrophils 58 %    % Lymphocytes 34 %    % Monocytes 6 %    % Eosinophils 2 %    % Basophils 0 %    % Immature Granulocytes 0 %    NRBCs per 100 WBC 0 <1 /100    Absolute Neutrophils 4.2 1.6 - 8.3 10e3/uL    Absolute Lymphocytes 2.5 0.8 - 5.3 10e3/uL    Absolute Monocytes 0.4 0.0 - 1.3 10e3/uL    Absolute Eosinophils 0.2 0.0 - 0.7 10e3/uL    Absolute Basophils 0.0 0.0 - 0.2 10e3/uL    Absolute Immature Granulocytes 0.0 <=0.0 10e3/uL    Absolute NRBCs 0.0 10e3/uL   Magnesium     Status: Normal   Result Value Ref Range    Magnesium 1.9 1.6 - 2.3  mg/dL   HCG QUALitative pregnancy (blood)     Status: Normal   Result Value Ref Range    hCG Serum Qualitative Negative Negative   Hemoglobin A1c     Status: Normal   Result Value Ref Range    Hemoglobin A1C 5.5 0.0 - 5.6 %   Potassium     Status: Normal   Result Value Ref Range    Potassium 5.0 3.4 - 5.3 mmol/L   EKG 12 lead     Status: None   Result Value Ref Range    Systolic Blood Pressure  mmHg    Diastolic Blood Pressure  mmHg    Ventricular Rate 99 BPM    Atrial Rate 99 BPM    HI Interval 142 ms    QRS Duration 88 ms     ms    QTc 449 ms    P Axis 28 degrees    R AXIS -9 degrees    T Axis 27 degrees    Interpretation ECG       Sinus rhythm  Voltage criteria for left ventricular hypertrophy  Nonspecific ST abnormality  Abnormal ECG  When compared with ECG of 11-MAR-2020 00:00,  No significant change was found  Confirmed by GENERATED REPORT, COMPUTER ([id number]),  Kori Lawrence ([id number]) on 11/16/2021 1:36:06 AM     [place] Draw     Status: None    Narrative    The following orders were created for panel order [place] Draw.  Procedure                               Abnormality         Status                     ---------                               -----------         ------                     Extra Blue Top Tube[[id number]]                              Final result               Extra Red Top Tube[[id number]]                               Final result               Extra Green Top (Lithium...[[id number]]                      Final result               Extra Purple Top Tube[[id number]]                            Final result                 Please view results for these tests on the individual orders.   CBC with platelets + differential     Status: None    Narrative    The following orders were created for panel order CBC with platelets + differential.  Procedure                               Abnormality         Status                     ---------                               -----------         ------                      CBC with platelets and d...[[id number]]                      Final result                 Please view results for these tests on the individual orders.         Procedures:  Procedures    Interventions:    Medications   0.9% sodium chloride BOLUS (0 mLs Intravenous Stopped 21)     Followed by   sodium chloride 0.9% infusion (has no administration in time range)   potassium chloride (KLOR-CON) Packet 40 mEq (40 mEq Oral Given 21)   metoclopramide (REGLAN) injection 10 mg (10 mg Intravenous Given 21)   diphenhydrAMINE (BENADRYL) injection 25 mg (25 mg Intravenous Given 21)   hydrALAZINE (APRESOLINE) injection 5 mg (5 mg Intravenous Given 21)   potassium chloride (KLOR-CON) Packet 40 mEq (40 mEq Oral Given 21)        Emergency Department Course:  Past medical records, nursing notes, and vitals reviewed.  I performed an exam of the patient and obtained history, as documented above.    Impression & Plan          CMS Diagnoses:  none  Covid-19  Mónica Aly was evaluated during a global COVID-19 pandemic, which necessitated consideration that the patient might be at risk for infection with the SARS-CoV-2 virus that causes COVID-19.   Applicable protocols for evaluation were followed during the patient's care.   COVID-19 was considered as part of the patient's evaluation.      Medical Decision Makin year old female presenting w/ headache, hypertension    Differential diagnosis includes as migrainosus, worse typical migraine, intracranial hemorrhage, medication noncompliance, hypertensive headache, electrolyte abnormality.  Labs significant for hypokalemia.  Imaging significant for no acute abnormality.  EKG demonstrated normal sinus rhythm without acute ischemic diabetes, normal intervals.  Potassium was repleted as noted above with 80 mEq.  Attempted repeat potassium check which unfortunately hemolyzed in lab.  Patient refused  further blood draws.  Given the patient is refusing follow-up potassium check, at this time feel she is safe for discharge.  She reports her headache has resolved.  She is encouraged to take all of her previously prescribed medications as previously instructed.  Her hypertension improved while in the emergency department.  She likely has a component of essential hypertension and secondary hypertension due to the pain she is experiencing with her headache.  Recommendations given regarding follow up with PCP and return to the emergency department as needed for new or worsening symptoms.  Pt counseled on all results, diagnosis and disposition.  They are understanding and agreeable to plan. Patient discharged in stable condition.        Diagnosis:    ICD-10-CM    1. Type 2 diabetes mellitus without complication, unspecified whether long term insulin use (H)  E11.9 Hemoglobin A1c     Hemoglobin A1c     Hemoglobin A1c   2. Hypokalemia  E87.6    3. Acute nonintractable headache, unspecified headache type  R51.9    4. Noncompliance with medication regimen  Z91.14    5. Hypertension, unspecified type  I10         Discharge Medications:  Discharge Medication List as of 11/16/2021  4:07 AM      START taking these medications    Details   potassium chloride (KLOR-CON) 20 MEQ packet Take 20 mEq by mouth 2 times daily for 7 days, Disp-14 packet, R-0, Local Print              11/16/2021   Luiz Stubbs MD Vaughn, Christopher E, MD  11/16/21 0415       Luiz Stubbs MD  11/16/21 0416

## 2021-11-16 NOTE — DISCHARGE INSTRUCTIONS
Take previously prescribed medications.    Please follow-up with your primary care doctor in 1 to 2 weeks.    Please check your blood sugar at home.    Please return to the emergency department as needed for new or worsening symptoms including severe and uncontrollable pain, severe confusion, fainting, vomiting and unable to keep anything down, any other concerning symptoms.      Discharge Instructions  Hypertension - High Blood Pressure    During you visit to the Emergency Department, your blood pressure was higher than the recommended blood pressure.  This may be related to stress, pain, medication or other temporary conditions. In these cases, your blood pressure may return to normal on its own. If you have a history of high blood pressure, you may need to have your provider adjust your medications. Sometimes, your high measurement here may indicate that you have developed high blood pressure that will stay high unless it is treated. As a general rule, high blood pressure causes problems over years rather than days, weeks, or months. So, while it is important to treat blood pressure, it is rarely important to treat blood pressure immediately. Occasionally we will begin a medication in the Emergency Department; more often we will recommend close follow-up for medications with a primary doctor/clinic.    Generally, every Emergency Department visit should have a follow-up clinic visit with either a primary or a specialty clinic/provider. Please follow-up as instructed by your emergency provider today.    Return to the Emergency Department if you start to have:  A severe headache.  Chest pain.  Shortness of breath.  Weakness or numbness that affects one part of the body.  Confusion.  Vision changes.  Significant swelling of legs and/or eyes.  A reaction to any medication started in the Emergency Department.    What can I do to help myself?  Avoid alcohol.  Take any blood pressure medicine that you are  prescribed.  Get a good night s sleep.  Lower your salt intake.  Exercise.  Lose weight.  Manage stress.  See your doctor regularly    If blood pressure medication was started in the Emergency Department:  The medicine may not have an immediate effect. The body and brain determine what blood pressure you have. The medicine s job is to retrain the body s  thermostat  to a lower blood pressure.  You will need to follow up with your provider to see how this medicine is working for you.  If you were given a prescription for medicine here today, be sure to read all of the information (including the package insert) that comes with your prescription.  This will include important information about the medicine, its side effects, and any warnings that you need to know about.  The pharmacist who fills the prescription can provide more information and answer questions you may have about the medicine.  If you have questions or concerns that the pharmacist cannot address, please call or return to the Emergency Department.   Remember that you can always come back to the Emergency Department if you are not able to see your regular provider in the amount of time listed above, if you get any new symptoms, or if there is anything that worries you.      Discharge Instructions  Headache    You were seen today for a headache. Headaches may be caused by many different things such as muscle tension, sinus inflammation, anxiety and stress, having too little sleep, too much alcohol, some medical conditions or injury. You may have a migraine, which is caused by changes in the blood vessels in your head.  At this time your provider does not find that your headache is a sign of anything dangerous or life-threatening.  However, sometimes the signs of serious illness do not show up right away.      Generally, every Emergency Department visit should have a follow-up clinic visit with either a primary or a specialty clinic/provider. Please  follow-up as instructed by your emergency provider today.    Return to the Emergency Department if:  You get a new fever of 100.4 F or higher.  Your headache gets much worse.  You get a stiff neck with your headache.  You get a new headache that is significantly different or worse than headaches you have had before.  You are vomiting (throwing up) and cannot keep food or water down.  You have blurry or double vision or other problems with your eyes.  You have a new weakness on one side of your body.  You have difficulty with balance which is new.  You or your family thinks you are confused.  You have a seizure.    What can I do to help myself?  Pain medications - You may take a pain medication such as Tylenol  (acetaminophen), Advil , Motrin  (ibuprofen) or Aleve  (naproxen).  Take a pain reliever as soon as you notice symptoms.  Starting medications as soon as you start to have symptoms may lessen the amount of pain you have.  Relaxing in a quiet, dark room may help.  Get enough sleep and eat meals regularly.  You may need to watch for certain foods or other things which may trigger your headaches.  Keeping a journal of your headaches and possible triggers may help you and your primary provider to identify things which you should avoid which may be causing your headaches.  If you were given a prescription for medicine here today, be sure to read all of the information (including the package insert) that comes with your prescription.  This will include important information about the medicine, its side effects, and any warnings that you need to know about.  The pharmacist who fills the prescription can provide more information and answer questions you may have about the medicine.  If you have questions or concerns that the pharmacist cannot address, please call or return to the Emergency Department.   Remember that you can always come back to the Emergency Department if you are not able to see your regular provider  in the amount of time listed above, if you get any new symptoms, or if there is anything that worries you.

## 2021-11-16 NOTE — LETTER
November 16, 2021      To Whom It May Concern:      Mónica Aly was seen in our Emergency Department today, 11/16/21.  I expect her condition to improve over the next 2 days.  She may return to work when improved.    Sincerely,        Luiz Stubbs MD

## 2021-11-16 NOTE — ED TRIAGE NOTES
Headaches for a couple months.  Hx of HTN and diabetes and admits she is not compliant with her medications.  For the last few days she has had a severe migraine with light sensitivity.

## 2022-01-09 ENCOUNTER — HEALTH MAINTENANCE LETTER (OUTPATIENT)
Age: 27
End: 2022-01-09

## 2022-03-06 ENCOUNTER — HEALTH MAINTENANCE LETTER (OUTPATIENT)
Age: 27
End: 2022-03-06

## 2022-04-14 ENCOUNTER — HOSPITAL ENCOUNTER (EMERGENCY)
Facility: CLINIC | Age: 27
Discharge: HOME | End: 2022-04-14
Attending: EMERGENCY MEDICINE | Admitting: EMERGENCY MEDICINE
Payer: COMMERCIAL

## 2022-04-14 ENCOUNTER — APPOINTMENT (OUTPATIENT)
Dept: MRI IMAGING | Facility: CLINIC | Age: 27
End: 2022-04-14
Attending: EMERGENCY MEDICINE
Payer: COMMERCIAL

## 2022-04-14 VITALS
DIASTOLIC BLOOD PRESSURE: 83 MMHG | BODY MASS INDEX: 46.49 KG/M2 | RESPIRATION RATE: 15 BRPM | HEIGHT: 70 IN | HEART RATE: 88 BPM | SYSTOLIC BLOOD PRESSURE: 143 MMHG | TEMPERATURE: 98.3 F | OXYGEN SATURATION: 98 %

## 2022-04-14 DIAGNOSIS — R93.89 ABNORMAL MRI: ICD-10-CM

## 2022-04-14 DIAGNOSIS — R51.9 NONINTRACTABLE HEADACHE, UNSPECIFIED CHRONICITY PATTERN, UNSPECIFIED HEADACHE TYPE: ICD-10-CM

## 2022-04-14 DIAGNOSIS — I10 HYPERTENSION, UNSPECIFIED TYPE: ICD-10-CM

## 2022-04-14 LAB
ALBUMIN SERPL-MCNC: 3.3 G/DL (ref 3.4–5)
ALP SERPL-CCNC: 72 U/L (ref 40–150)
ALT SERPL W P-5'-P-CCNC: 22 U/L (ref 0–50)
ANION GAP SERPL CALCULATED.3IONS-SCNC: 4 MMOL/L (ref 3–14)
AST SERPL W P-5'-P-CCNC: 18 U/L (ref 0–45)
ATRIAL RATE - MUSE: 96 BPM
BASOPHILS # BLD AUTO: 0 10E3/UL (ref 0–0.2)
BASOPHILS NFR BLD AUTO: 0 %
BILIRUB SERPL-MCNC: 0.2 MG/DL (ref 0.2–1.3)
BUN SERPL-MCNC: 7 MG/DL (ref 7–30)
CALCIUM SERPL-MCNC: 8 MG/DL (ref 8.5–10.1)
CHLORIDE BLD-SCNC: 104 MMOL/L (ref 94–109)
CO2 SERPL-SCNC: 28 MMOL/L (ref 20–32)
CREAT SERPL-MCNC: 0.62 MG/DL (ref 0.52–1.04)
DIASTOLIC BLOOD PRESSURE - MUSE: NORMAL MMHG
EOSINOPHIL # BLD AUTO: 0.2 10E3/UL (ref 0–0.7)
EOSINOPHIL NFR BLD AUTO: 2 %
ERYTHROCYTE [DISTWIDTH] IN BLOOD BY AUTOMATED COUNT: 12.8 % (ref 10–15)
GFR SERPL CREATININE-BSD FRML MDRD: >90 ML/MIN/1.73M2
GLUCOSE BLD-MCNC: 130 MG/DL (ref 70–99)
GLUCOSE BLDC GLUCOMTR-MCNC: 143 MG/DL (ref 70–99)
HCG SER QL IA.RAPID: NEGATIVE
HCT VFR BLD AUTO: 40.1 % (ref 35–47)
HGB BLD-MCNC: 13.3 G/DL (ref 11.7–15.7)
IMM GRANULOCYTES # BLD: 0 10E3/UL
IMM GRANULOCYTES NFR BLD: 0 %
INTERPRETATION ECG - MUSE: NORMAL
LYMPHOCYTES # BLD AUTO: 2.1 10E3/UL (ref 0.8–5.3)
LYMPHOCYTES NFR BLD AUTO: 30 %
MCH RBC QN AUTO: 28.3 PG (ref 26.5–33)
MCHC RBC AUTO-ENTMCNC: 33.2 G/DL (ref 31.5–36.5)
MCV RBC AUTO: 85 FL (ref 78–100)
MONOCYTES # BLD AUTO: 0.4 10E3/UL (ref 0–1.3)
MONOCYTES NFR BLD AUTO: 6 %
NEUTROPHILS # BLD AUTO: 4.3 10E3/UL (ref 1.6–8.3)
NEUTROPHILS NFR BLD AUTO: 62 %
NRBC # BLD AUTO: 0 10E3/UL
NRBC BLD AUTO-RTO: 0 /100
P AXIS - MUSE: 40 DEGREES
PLAT MORPH BLD: NORMAL
PLATELET # BLD AUTO: 268 10E3/UL (ref 150–450)
POTASSIUM BLD-SCNC: 3 MMOL/L (ref 3.4–5.3)
PR INTERVAL - MUSE: 158 MS
PROT SERPL-MCNC: 7 G/DL (ref 6.8–8.8)
QRS DURATION - MUSE: 84 MS
QT - MUSE: 388 MS
QTC - MUSE: 490 MS
R AXIS - MUSE: 7 DEGREES
RBC # BLD AUTO: 4.7 10E6/UL (ref 3.8–5.2)
RBC MORPH BLD: NORMAL
SODIUM SERPL-SCNC: 136 MMOL/L (ref 133–144)
SYSTOLIC BLOOD PRESSURE - MUSE: NORMAL MMHG
T AXIS - MUSE: 33 DEGREES
TROPONIN I SERPL HS-MCNC: 7 NG/L
VENTRICULAR RATE- MUSE: 96 BPM
WBC # BLD AUTO: 7.1 10E3/UL (ref 4–11)

## 2022-04-14 PROCEDURE — 258N000003 HC RX IP 258 OP 636: Performed by: EMERGENCY MEDICINE

## 2022-04-14 PROCEDURE — 255N000002 HC RX 255 OP 636: Performed by: EMERGENCY MEDICINE

## 2022-04-14 PROCEDURE — 93005 ELECTROCARDIOGRAM TRACING: CPT

## 2022-04-14 PROCEDURE — 85025 COMPLETE CBC W/AUTO DIFF WBC: CPT | Performed by: EMERGENCY MEDICINE

## 2022-04-14 PROCEDURE — A9585 GADOBUTROL INJECTION: HCPCS | Performed by: EMERGENCY MEDICINE

## 2022-04-14 PROCEDURE — 84702 CHORIONIC GONADOTROPIN TEST: CPT

## 2022-04-14 PROCEDURE — 80053 COMPREHEN METABOLIC PANEL: CPT | Performed by: EMERGENCY MEDICINE

## 2022-04-14 PROCEDURE — 96374 THER/PROPH/DIAG INJ IV PUSH: CPT | Mod: 59

## 2022-04-14 PROCEDURE — 250N000013 HC RX MED GY IP 250 OP 250 PS 637: Performed by: EMERGENCY MEDICINE

## 2022-04-14 PROCEDURE — 36415 COLL VENOUS BLD VENIPUNCTURE: CPT | Performed by: EMERGENCY MEDICINE

## 2022-04-14 PROCEDURE — 70553 MRI BRAIN STEM W/O & W/DYE: CPT

## 2022-04-14 PROCEDURE — 99285 EMERGENCY DEPT VISIT HI MDM: CPT | Mod: 25

## 2022-04-14 PROCEDURE — 96375 TX/PRO/DX INJ NEW DRUG ADDON: CPT

## 2022-04-14 PROCEDURE — 250N000011 HC RX IP 250 OP 636: Performed by: EMERGENCY MEDICINE

## 2022-04-14 PROCEDURE — 96361 HYDRATE IV INFUSION ADD-ON: CPT

## 2022-04-14 PROCEDURE — 84484 ASSAY OF TROPONIN QUANT: CPT | Performed by: EMERGENCY MEDICINE

## 2022-04-14 RX ORDER — DIPHENHYDRAMINE HYDROCHLORIDE 50 MG/ML
25 INJECTION INTRAMUSCULAR; INTRAVENOUS ONCE
Status: COMPLETED | OUTPATIENT
Start: 2022-04-14 | End: 2022-04-14

## 2022-04-14 RX ORDER — POTASSIUM CHLORIDE 1.5 G/1.58G
40 POWDER, FOR SOLUTION ORAL ONCE
Status: COMPLETED | OUTPATIENT
Start: 2022-04-14 | End: 2022-04-14

## 2022-04-14 RX ORDER — KETOROLAC TROMETHAMINE 15 MG/ML
15 INJECTION, SOLUTION INTRAMUSCULAR; INTRAVENOUS ONCE
Status: COMPLETED | OUTPATIENT
Start: 2022-04-14 | End: 2022-04-14

## 2022-04-14 RX ORDER — GADOBUTROL 604.72 MG/ML
14 INJECTION INTRAVENOUS ONCE
Status: COMPLETED | OUTPATIENT
Start: 2022-04-14 | End: 2022-04-14

## 2022-04-14 RX ORDER — METOCLOPRAMIDE HYDROCHLORIDE 5 MG/ML
5 INJECTION INTRAMUSCULAR; INTRAVENOUS ONCE
Status: COMPLETED | OUTPATIENT
Start: 2022-04-14 | End: 2022-04-14

## 2022-04-14 RX ADMIN — DIPHENHYDRAMINE HYDROCHLORIDE 25 MG: 50 INJECTION, SOLUTION INTRAMUSCULAR; INTRAVENOUS at 21:00

## 2022-04-14 RX ADMIN — POTASSIUM CHLORIDE 40 MEQ: 1.5 POWDER, FOR SOLUTION ORAL at 22:39

## 2022-04-14 RX ADMIN — METOCLOPRAMIDE 5 MG: 5 INJECTION, SOLUTION INTRAMUSCULAR; INTRAVENOUS at 21:00

## 2022-04-14 RX ADMIN — SODIUM CHLORIDE 1000 ML: 9 INJECTION, SOLUTION INTRAVENOUS at 20:00

## 2022-04-14 RX ADMIN — GADOBUTROL 14 ML: 604.72 INJECTION INTRAVENOUS at 21:48

## 2022-04-14 RX ADMIN — KETOROLAC TROMETHAMINE 15 MG: 15 INJECTION, SOLUTION INTRAMUSCULAR; INTRAVENOUS at 20:59

## 2022-04-14 ASSESSMENT — VISUAL ACUITY
OD: 20/50;WITHOUT CORRECTIVE LENSES
OS: 20/25;WITHOUT CORRECTIVE LENSES

## 2022-04-14 NOTE — ED TRIAGE NOTES
Pt has hypertension, was off meds for a couple months. Pt. Started back up yesterday per [facility] recommendation. Pt. Today has headache, blurred vision and continues to have high blood pressure, flushed and hot feeling.

## 2022-04-15 NOTE — ED PROVIDER NOTES
History   Chief Complaint:  Hypertension and Headache    The history is provided by the patient.   History supplemented by electronic chart review, including Care Everywhere.     Mónica Aly is a 26 year old female with history of type 2 diabetes, DKA, hypertension and Bell palsy who presents with hypertension and headache. Patient reports that she has a history of hypertension and had been not taking medications for this for a few months. States that she was seen at the [facility] ED yesterday, after having a blood pressure reading of 192/136 at her OB/GYN earlier yesterday, where she was told to restart 10 mg amlodipine and 40 mg lisinopril. Notes that after arriving home from the ED yesterday she developed a headache, while lying in bed. Her headache has not resolved since yesterday and it is not improved much with Tylenol. She has headaches intermittently, every few weeks for quite some time now, but her headache today is somewhat more intense.  She adds that while she was working today, she had onset of binocular visual disturbance, hard to describe, not focal field deficit.  No eye pain.      Patient reports that she took both her amlodipine and lisinopril this morning. She is not taking medications for her diabetes and is not taking her potassium. No history of stroke and she has not had a seizure since she was young. No other physical concerns. She does not have a blood pressure cuff at home.  She chose to not take her blood pressure medication of the last few months because she was hoping to get by without them, but volunteers that she also did not make any lifestyle changes to help her blood pressure decrease.    Review of Systems   All other systems reviewed and are negative.    Allergies:  No Known Allergies    Medications:  Amlodipine 10 mg - resumed yesterday  Lisinopril 40 mg - resumed yesterday     Past Medical History:     Type 2 diabetes  Bell palsy  Obese  Hypertension  DKA    Past Surgical  "History:    Tonsillectomy  [place] teeth extraction  Cholecystectomy     Social History:  Patient presents alone  Presents via private vehicle  Patient works as an   PCP: Ximena Og  Lives with her family    Physical Exam     Patient Vitals for the past 24 hrs:   BP Temp Temp src Pulse Resp SpO2 Height   04/14/22 2242 (!) 143/83 -- -- 88 15 98 % --   04/14/22 2200 (!) 150/91 -- -- 99 15 97 % --   04/14/22 2030 (!) 151/90 -- -- 98 13 98 % --   04/14/22 2000 (!) 195/126 -- -- -- -- -- --   04/14/22 1940 (!) 176/113 -- -- -- -- -- --   04/14/22 1752 (!) 201/124 98.3  F (36.8  C) Temporal 114 20 98 % 1.778 m (5' 10\")     Physical Exam  General: Nontoxic-appearing woman sitting upright in room 4  HENT: mucous membranes moist, OP clear, TMs clear  Eyes: pupils normal without nystagmus, no photophobia, no scleral injection, VA 20/50 R, 20/25 L  CV: regular rate as above, regular rhythm, no murmur audible, normal radial pulses, no LE edema  Resp: normal effort, speaks in full phrases, no stridor, no cough observed  GI: abdomen soft and nontender  MSK: no bony tenderness to face, skull, or cervical spine  Skin: appropriately warm and dry, no petechiae, no vesicles  Neuro: awake, alert, clear speech, fully oriented, barely perceptible left facial droop (chronic),  normal, strength and sensation intact in all extr, no nuchal rigidity, ambulatory without ataxia  Psych: cooperative, pleasant, no evidence of halluciantions      Emergency Department Course   ECG  ECG obtained at 1936, ECG read at 1942  NSR  Possible left atrial enlargement   Rate 96 bpm. OR interval 158 ms. QRS duration 84 ms. QT/QTc 388/490 ms. P-R-T axes 40 7 33.     Imaging:  MR Brain w/o & w Contrast   Final Result   IMPRESSION:   1.  No acute findings.   2.  Slightly prominent optic nerve sheaths bilaterally, a finding that may be indicative of intracranial hypertension/pseudotumor cerebri in the appropriate clinical setting.        Report " per radiology.    Laboratory:  Labs Ordered and Resulted from Time of ED Arrival to Time of ED Departure   COMPREHENSIVE METABOLIC PANEL - Abnormal       Result Value    Sodium 136      Potassium 3.0 (*)     Chloride 104      Carbon Dioxide (CO2) 28      Anion Gap 4      Urea Nitrogen 7      Creatinine 0.62      Calcium 8.0 (*)     Glucose 130 (*)     Alkaline Phosphatase 72      AST 18      ALT 22      Protein Total 7.0      Albumin 3.3 (*)     Bilirubin Total 0.2      GFR Estimate >90     GLUCOSE BY METER - Abnormal    GLUCOSE BY METER POCT 143 (*)    TROPONIN I - Normal    Troponin I High Sensitivity 7     ISTAT HCG QUALITATIVE PREGNANCY POCT - Normal    HCG Qualitative POCT Negative     CBC WITH PLATELETS AND DIFFERENTIAL    WBC Count 7.1      RBC Count 4.70      Hemoglobin 13.3      Hematocrit 40.1      MCV 85      MCH 28.3      MCHC 33.2      RDW 12.8      Platelet Count 268      % Neutrophils 62      % Lymphocytes 30      % Monocytes 6      % Eosinophils 2      % Basophils 0      % Immature Granulocytes 0      NRBCs per 100 WBC 0      Absolute Neutrophils 4.3      Absolute Lymphocytes 2.1      Absolute Monocytes 0.4      Absolute Eosinophils 0.2      Absolute Basophils 0.0      Absolute Immature Granulocytes 0.0      Absolute NRBCs 0.0     RBC AND PLATELET MORPHOLOGY    Platelet Assessment        Value: Automated Count Confirmed. Platelet morphology is normal.    RBC Morphology Confirmed RBC Indices     GLUCOSE MONITOR NURSING POCT     Emergency Department Course:  Reviewed:  I reviewed nursing notes, vitals, past medical history and Care Everywhere    Assessments/Consults:  1930 I obtained history and examined the patient.   2046  Patient rechecked and updated. Her most recent blood pressure has improved.   2215 I rechecked the patient and updated her on her MRI findings.     Interventions:  2000 NS, 1 L, IV  2059 Toradol, 15 mg, IV  2100 Benadryl, 25 mg, IV           Reglan, 5 mg, IV    Disposition:  The  patient was discharged to home.     Impression & Plan   Medical Decision Making:  She does not check her blood pressure regularly, and therefore the chronicity of her elevated blood pressure readings is unconfirmed.  She has a history of headaches over quite some time, which certainly may represent a primary headache disorder, though I felt that work-up was indicated in light of her multiple recent visits, to identify or rule out evidence of endorgan damage from her elevated blood pressure readings.  Blood pressure did improve significantly with time and without any specific antihypertensive intervention.  MRI imaging does not show any evidence of acute ischemia, hemorrhage, tumor or other structural intracranial cause beyond possible optic nerve abnormality which may represent elevated intracranial pressure, which was specifically discussed with her along with encouragement to pursue outpatient follow-up with neurology to consider lumbar puncture and further test.  I do not think that emergent spinal tap is indicated, nor initiation of Diamox, especially given that her habitus is such that a reliable opening pressure is not likely to be obtainable with a blind lumbar puncture.  Her symptoms, including visual symptoms, improved with treatments provided and she readily agreed to discharge home and outpatient follow-up with referral formation provided.  She should return here for sudden worsening at any hour.    This note was completed in part using Dragon voice recognition software. Although reviewed after completion, some word and grammatical errors may occur.    Diagnosis:    ICD-10-CM    1. Hypertension, unspecified type  I10    2. Nonintractable headache, unspecified chronicity pattern, unspecified headache type  R51.9    3. Abnormal MRI  R93.89     possible intracranial HTN     Scribe Disclosure:  Cristofer FISHMAN, am serving as a scribe at 7:21 PM on 4/14/2022 to document services personally performed by  Amrik Enrique MD based on my observations and the provider's statements to me.     This note was completed in part using Dragon voice recognition software. Although reviewed after completion, some word and grammatical errors may occur.            Amrik Enrique MD  04/15/22 0042

## 2022-05-17 PROCEDURE — 99284 EMERGENCY DEPT VISIT MOD MDM: CPT | Mod: 25

## 2022-05-17 PROCEDURE — 93005 ELECTROCARDIOGRAM TRACING: CPT

## 2022-05-17 PROCEDURE — 96374 THER/PROPH/DIAG INJ IV PUSH: CPT

## 2022-05-18 ENCOUNTER — HOSPITAL ENCOUNTER (EMERGENCY)
Facility: CLINIC | Age: 27
Discharge: HOME | End: 2022-05-18
Attending: EMERGENCY MEDICINE | Admitting: EMERGENCY MEDICINE
Payer: COMMERCIAL

## 2022-05-18 VITALS
DIASTOLIC BLOOD PRESSURE: 94 MMHG | HEART RATE: 78 BPM | WEIGHT: 293 LBS | TEMPERATURE: 97.6 F | RESPIRATION RATE: 18 BRPM | SYSTOLIC BLOOD PRESSURE: 160 MMHG | BODY MASS INDEX: 43.4 KG/M2 | OXYGEN SATURATION: 99 % | HEIGHT: 69 IN

## 2022-05-18 DIAGNOSIS — I10 HYPERTENSION, UNSPECIFIED TYPE: ICD-10-CM

## 2022-05-18 DIAGNOSIS — E87.6 HYPOKALEMIA: ICD-10-CM

## 2022-05-18 DIAGNOSIS — R60.9 EDEMA, UNSPECIFIED TYPE: ICD-10-CM

## 2022-05-18 LAB
ANION GAP SERPL CALCULATED.3IONS-SCNC: 6 MMOL/L (ref 3–14)
ATRIAL RATE - MUSE: 93 BPM
BASOPHILS # BLD AUTO: 0 10E3/UL (ref 0–0.2)
BASOPHILS NFR BLD AUTO: 0 %
BUN SERPL-MCNC: 10 MG/DL (ref 7–30)
CALCIUM SERPL-MCNC: 8.4 MG/DL (ref 8.5–10.1)
CHLORIDE BLD-SCNC: 105 MMOL/L (ref 94–109)
CO2 SERPL-SCNC: 27 MMOL/L (ref 20–32)
CREAT SERPL-MCNC: 0.75 MG/DL (ref 0.52–1.04)
D DIMER PPP FEU-MCNC: 0.34 UG/ML FEU (ref 0–0.5)
DIASTOLIC BLOOD PRESSURE - MUSE: NORMAL MMHG
EOSINOPHIL # BLD AUTO: 0.1 10E3/UL (ref 0–0.7)
EOSINOPHIL NFR BLD AUTO: 2 %
ERYTHROCYTE [DISTWIDTH] IN BLOOD BY AUTOMATED COUNT: 12.7 % (ref 10–15)
GFR SERPL CREATININE-BSD FRML MDRD: >90 ML/MIN/1.73M2
GLUCOSE BLD-MCNC: 124 MG/DL (ref 70–99)
HCG SERPL QL: NEGATIVE
HCT VFR BLD AUTO: 37.8 % (ref 35–47)
HGB BLD-MCNC: 12.6 G/DL (ref 11.7–15.7)
IMM GRANULOCYTES # BLD: 0 10E3/UL
IMM GRANULOCYTES NFR BLD: 0 %
INTERPRETATION ECG - MUSE: NORMAL
LYMPHOCYTES # BLD AUTO: 2.7 10E3/UL (ref 0.8–5.3)
LYMPHOCYTES NFR BLD AUTO: 38 %
MCH RBC QN AUTO: 28.4 PG (ref 26.5–33)
MCHC RBC AUTO-ENTMCNC: 33.3 G/DL (ref 31.5–36.5)
MCV RBC AUTO: 85 FL (ref 78–100)
MONOCYTES # BLD AUTO: 0.4 10E3/UL (ref 0–1.3)
MONOCYTES NFR BLD AUTO: 6 %
NEUTROPHILS # BLD AUTO: 3.8 10E3/UL (ref 1.6–8.3)
NEUTROPHILS NFR BLD AUTO: 54 %
NRBC # BLD AUTO: 0 10E3/UL
NRBC BLD AUTO-RTO: 0 /100
NT-PROBNP SERPL-MCNC: 22 PG/ML (ref 0–450)
P AXIS - MUSE: 49 DEGREES
PLATELET # BLD AUTO: 329 10E3/UL (ref 150–450)
POTASSIUM BLD-SCNC: 3 MMOL/L (ref 3.4–5.3)
PR INTERVAL - MUSE: 146 MS
QRS DURATION - MUSE: 84 MS
QT - MUSE: 362 MS
QTC - MUSE: 450 MS
R AXIS - MUSE: 46 DEGREES
RBC # BLD AUTO: 4.43 10E6/UL (ref 3.8–5.2)
SODIUM SERPL-SCNC: 138 MMOL/L (ref 133–144)
SYSTOLIC BLOOD PRESSURE - MUSE: NORMAL MMHG
T AXIS - MUSE: -1 DEGREES
VENTRICULAR RATE- MUSE: 93 BPM
WBC # BLD AUTO: 7 10E3/UL (ref 4–11)

## 2022-05-18 PROCEDURE — 85379 FIBRIN DEGRADATION QUANT: CPT | Performed by: EMERGENCY MEDICINE

## 2022-05-18 PROCEDURE — 84703 CHORIONIC GONADOTROPIN ASSAY: CPT | Performed by: EMERGENCY MEDICINE

## 2022-05-18 PROCEDURE — 36415 COLL VENOUS BLD VENIPUNCTURE: CPT | Performed by: EMERGENCY MEDICINE

## 2022-05-18 PROCEDURE — 85025 COMPLETE CBC W/AUTO DIFF WBC: CPT | Performed by: EMERGENCY MEDICINE

## 2022-05-18 PROCEDURE — 82435 ASSAY OF BLOOD CHLORIDE: CPT | Performed by: EMERGENCY MEDICINE

## 2022-05-18 PROCEDURE — 250N000011 HC RX IP 250 OP 636: Performed by: EMERGENCY MEDICINE

## 2022-05-18 PROCEDURE — 83880 ASSAY OF NATRIURETIC PEPTIDE: CPT | Performed by: EMERGENCY MEDICINE

## 2022-05-18 PROCEDURE — 250N000013 HC RX MED GY IP 250 OP 250 PS 637: Performed by: EMERGENCY MEDICINE

## 2022-05-18 RX ORDER — POTASSIUM CHLORIDE 1.5 G/1.58G
40 POWDER, FOR SOLUTION ORAL ONCE
Status: COMPLETED | OUTPATIENT
Start: 2022-05-18 | End: 2022-05-18

## 2022-05-18 RX ORDER — POTASSIUM CHLORIDE 1.5 G/1.58G
20 POWDER, FOR SOLUTION ORAL 2 TIMES DAILY
Qty: 40 PACKET | Refills: 0 | Status: SHIPPED | OUTPATIENT
Start: 2022-05-18 | End: 2022-06-07

## 2022-05-18 RX ORDER — FUROSEMIDE 10 MG/ML
20 SOLUTION ORAL DAILY
Qty: 40 ML | Refills: 0 | Status: SHIPPED | OUTPATIENT
Start: 2022-05-18 | End: 2022-11-15

## 2022-05-18 RX ORDER — FUROSEMIDE 10 MG/ML
20 INJECTION INTRAMUSCULAR; INTRAVENOUS ONCE
Status: COMPLETED | OUTPATIENT
Start: 2022-05-18 | End: 2022-05-18

## 2022-05-18 RX ADMIN — POTASSIUM CHLORIDE 40 MEQ: 1.5 POWDER, FOR SOLUTION ORAL at 03:48

## 2022-05-18 RX ADMIN — FUROSEMIDE 20 MG: 10 INJECTION, SOLUTION INTRAVENOUS at 03:48

## 2022-05-18 ASSESSMENT — ENCOUNTER SYMPTOMS
SHORTNESS OF BREATH: 0
ROS SKIN COMMENTS: EDEMA
UNEXPECTED WEIGHT CHANGE: 0

## 2022-05-18 NOTE — LETTER
May 18, 2022      To Whom It May Concern:      Mónica Aly was seen in our Emergency Department today, 05/18/22.  I expect her condition to improve over the next day.  She may return to work/school when improved.    Sincerely,        Kian OVALLE

## 2022-05-18 NOTE — ED PROVIDER NOTES
"  History     Chief Complaint:  Edema      HPI   Mónica Aly is a 26 year old female who has a history of DKA, DM II, and presents with edema. Patient started to having swelling in her feet starting on Monday. The swelling occurred suddenly with no trigger. The pain is described to be like a throbbing sensation. She denies chest pain, shortness of breath, noticed weight gain, or tenderness in calves. She denies personal or family history of blood clots.        Review of Systems   Constitutional: Negative for unexpected weight change.   Respiratory: Negative for shortness of breath.    Cardiovascular: Negative for chest pain.   Musculoskeletal:        No tenderness in calves   Skin:        edema   All other systems reviewed and are negative.      Allergies:  No Known Allergies      Medications:    amlodipine  blood glucose  test strip  blood glucose calibration  solution  blood glucose monitoring meter device kit  clindamycin   dapagliflozin   exenatide ER   insulin aspart   insulin glargine   insulin pen needle   lisinopril   Lorazepam   metformin   ondansetron  oxycodone  potassium chloride ER   senna-docusate sodium   Thin lancets   Spironolactone  trulicity    Past Medical History:    Bell palsy  Cholecystolithiasis  Chronic cough  Diabetes  Gallstones  Hypertension  Obese  DKA  Allergic rhinitis  Acute suppurative otitis media without spontaneous rupture of eardrum     Past Surgical History:    Tonsillectomy  [place] teeth extraction  Laparoscopic cholecystectomy    Social History:  Presents alone  , works from home    Physical Exam     Patient Vitals for the past 24 hrs:   BP Temp Temp src Pulse Resp SpO2 Height Weight   05/18/22 0206 (!) 160/94 -- -- 89 -- 100 % -- --   05/17/22 2342 (!) 180/96 97.6  F (36.4  C) Oral 99 22 99 % 1.753 m (5' 9\") (!) 154.2 kg (340 lb)       Physical Exam  General: Alert, interactive   Head:  Scalp is atraumatic  Eyes:  The pupils are equal, round, and reactive to " light    EOM's intact    No scleral icterus  ENT:      Nose:  The external nose is normal  Ears:  External ears are normal  Mouth/Throat: The oropharynx is normal    Mucus membranes are moist       Neck:  Normal range of motion.      There is no rigidity.    Trachea is in the midline         CV:  Regular rate and rhythm    No murmur, +1 edema both feet bilaterally, +2 DP pulses bilaterally    Resp:  Breath sounds are clear bilaterally    Non-labored, no retractions or accessory muscle use     MS:  Normal strength in all 4 extremities  Skin:  Warm and dry, No rash or lesions noted.   Neuro: Strength 5/5 x4.  Sensation intact  In all 4 extremities.       GCS: 15  Psych:  Awake. Alert.  Normal affect.      Appropriate interactions.      Emergency Department Course     ECG results from 05/18/22   EKG 12-lead, tracing only     Value    Systolic Blood Pressure     Diastolic Blood Pressure     Ventricular Rate 93    Atrial Rate 93    KY Interval 146    QRS Duration 84        QTc 450    P Axis 49    R AXIS 46    T Axis -1    Interpretation ECG      Sinus rhythm  Normal ECG  When compared with ECG of 14-APR-2022 19:36,  T wave inversion now evident in Inferior leads  Nonspecific T wave abnormality, worse in Lateral leads  Confirmed by GENERATED REPORT, COMPUTER ([id number]),  Nolberto Coburn ([id number]) on 5/18/2022 1:25:49 AM       Laboratory:  Labs Ordered and Resulted from Time of ED Arrival to Time of ED Departure   BASIC METABOLIC PANEL - Abnormal       Result Value    Sodium 138      Potassium 3.0 (*)     Chloride 105      Carbon Dioxide (CO2) 27      Anion Gap 6      Urea Nitrogen 10      Creatinine 0.75      Calcium 8.4 (*)     Glucose 124 (*)     GFR Estimate >90     NT PROBNP INPATIENT - Normal    N terminal Pro BNP Inpatient 22     D DIMER QUANTITATIVE - Normal    D-Dimer Quantitative 0.34     HCG QUALITATIVE PREGNANCY - Normal    hCG Serum Qualitative Negative     CBC WITH PLATELETS AND DIFFERENTIAL    WBC Count  7.0      RBC Count 4.43      Hemoglobin 12.6      Hematocrit 37.8      MCV 85      MCH 28.4      MCHC 33.3      RDW 12.7      Platelet Count 329      % Neutrophils 54      % Lymphocytes 38      % Monocytes 6      % Eosinophils 2      % Basophils 0      % Immature Granulocytes 0      NRBCs per 100 WBC 0      Absolute Neutrophils 3.8      Absolute Lymphocytes 2.7      Absolute Monocytes 0.4      Absolute Eosinophils 0.1      Absolute Basophils 0.0      Absolute Immature Granulocytes 0.0      Absolute NRBCs 0.0           Emergency Department Course:    Reviewed:  I reviewed nursing notes, vitals, past medical history, Care Everywhere and [facility]    Assessments:  0219 I obtained history and examined the patient as noted above.   0335 I rechecked the patient and explained findings.   0357 I rechecked the patient.    Interventions:  0348 Potassium chloride packet 40 mEq PO  0348 Furosemide 20 mg IV    Disposition:  The patient was discharged to home.     Impression & Plan      Medical Decision Making:  Following presentation history and physical examination were performed, the above work-up was undertaken.  Patient has no chest pain or shortness of breath suggest acute coronary syndrome, pulmonary embolism, congestive heart failure.  Laboratory work-up is reassuring other than hypokalemia which was replaced in the emergency department.  Given the edema I will prescribe a low-dose diuretic and have also provided potassium supplementation.  I recommended compression stockings and close follow-up with her primary care provider.  I think this likely represents dependent anemia but may be complicated by her hypertension and diabetes as well.  Patient is safe for discharge to home and follow-up with her primary care provider and will return here if new symptoms develop.    Diagnosis:    ICD-10-CM    1. Hypertension, unspecified type  I10    2. Edema, unspecified type  R60.9    3. Hypokalemia  E87.6        Discharge  Medications:  New Prescriptions    FUROSEMIDE (LASIX) 10 MG/ML SOLUTION    Take 2 mLs (20 mg) by mouth daily for 20 days    POTASSIUM CHLORIDE (KLOR-CON) 20 MEQ PACKET    Take 20 mEq by mouth 2 times daily for 20 days         Scribe Disclosure:  aKti FISHMAN, am serving as a scribe at 1:58 AM on 5/18/2022 to document services personally performed by Andrez Graves MD based on my observations and the provider's statements to me.      Andrez Graves MD  05/18/22 1524

## 2022-05-18 NOTE — ED TRIAGE NOTES
Patient complaints of bilateral foot, ankle swelling.  Swelling started yesterday.  Some pain to right foot.      Triage Assessment     Row Name 05/17/22 5080       Triage Assessment (Adult)    Airway WDL WDL       Respiratory WDL    Respiratory WDL WDL       Skin Circulation/Temperature WDL    Skin Circulation/Temperature WDL WDL       Cardiac WDL    Cardiac WDL WDL       Peripheral/Neurovascular WDL    Peripheral Neurovascular WDL WDL       Cognitive/Neuro/Behavioral WDL    Cognitive/Neuro/Behavioral WDL WDL

## 2022-06-26 ENCOUNTER — HEALTH MAINTENANCE LETTER (OUTPATIENT)
Age: 27
End: 2022-06-26

## 2022-08-30 ENCOUNTER — HOSPITAL ENCOUNTER (EMERGENCY)
Facility: CLINIC | Age: 27
Discharge: HOME | End: 2022-08-30
Attending: EMERGENCY MEDICINE | Admitting: EMERGENCY MEDICINE
Payer: COMMERCIAL

## 2022-08-30 VITALS
RESPIRATION RATE: 18 BRPM | OXYGEN SATURATION: 99 % | BODY MASS INDEX: 51.69 KG/M2 | SYSTOLIC BLOOD PRESSURE: 151 MMHG | HEART RATE: 93 BPM | DIASTOLIC BLOOD PRESSURE: 90 MMHG | WEIGHT: 293 LBS | TEMPERATURE: 97.8 F

## 2022-08-30 DIAGNOSIS — E87.6 HYPOKALEMIA: ICD-10-CM

## 2022-08-30 DIAGNOSIS — R73.9 HYPERGLYCEMIA: ICD-10-CM

## 2022-08-30 DIAGNOSIS — M79.89 LEG SWELLING: ICD-10-CM

## 2022-08-30 LAB
ALBUMIN SERPL-MCNC: 3.6 G/DL (ref 3.4–5)
ALP SERPL-CCNC: 85 U/L (ref 40–150)
ALT SERPL W P-5'-P-CCNC: 26 U/L (ref 0–50)
ANION GAP SERPL CALCULATED.3IONS-SCNC: 9 MMOL/L (ref 3–14)
AST SERPL W P-5'-P-CCNC: 17 U/L (ref 0–45)
ATRIAL RATE - MUSE: 86 BPM
BASOPHILS # BLD AUTO: 0 10E3/UL (ref 0–0.2)
BASOPHILS NFR BLD AUTO: 0 %
BILIRUB SERPL-MCNC: 0.3 MG/DL (ref 0.2–1.3)
BUN SERPL-MCNC: 10 MG/DL (ref 7–30)
CALCIUM SERPL-MCNC: 8.5 MG/DL (ref 8.5–10.1)
CHLORIDE BLD-SCNC: 102 MMOL/L (ref 94–109)
CO2 SERPL-SCNC: 26 MMOL/L (ref 20–32)
CREAT SERPL-MCNC: 0.6 MG/DL (ref 0.52–1.04)
D DIMER PPP FEU-MCNC: <0.27 UG/ML FEU (ref 0–0.5)
DIASTOLIC BLOOD PRESSURE - MUSE: NORMAL MMHG
EOSINOPHIL # BLD AUTO: 0.1 10E3/UL (ref 0–0.7)
EOSINOPHIL NFR BLD AUTO: 2 %
ERYTHROCYTE [DISTWIDTH] IN BLOOD BY AUTOMATED COUNT: 13 % (ref 10–15)
GFR SERPL CREATININE-BSD FRML MDRD: >90 ML/MIN/1.73M2
GLUCOSE BLD-MCNC: 193 MG/DL (ref 70–99)
HCG SERPL QL: NEGATIVE
HCT VFR BLD AUTO: 39.4 % (ref 35–47)
HGB BLD-MCNC: 13.1 G/DL (ref 11.7–15.7)
HOLD SPECIMEN: NORMAL
IMM GRANULOCYTES # BLD: 0.1 10E3/UL
IMM GRANULOCYTES NFR BLD: 1 %
INTERPRETATION ECG - MUSE: NORMAL
LYMPHOCYTES # BLD AUTO: 2.4 10E3/UL (ref 0.8–5.3)
LYMPHOCYTES NFR BLD AUTO: 30 %
MCH RBC QN AUTO: 28.5 PG (ref 26.5–33)
MCHC RBC AUTO-ENTMCNC: 33.2 G/DL (ref 31.5–36.5)
MCV RBC AUTO: 86 FL (ref 78–100)
MONOCYTES # BLD AUTO: 0.5 10E3/UL (ref 0–1.3)
MONOCYTES NFR BLD AUTO: 7 %
NEUTROPHILS # BLD AUTO: 4.7 10E3/UL (ref 1.6–8.3)
NEUTROPHILS NFR BLD AUTO: 60 %
NRBC # BLD AUTO: 0 10E3/UL
NRBC BLD AUTO-RTO: 0 /100
NT-PROBNP SERPL-MCNC: 30 PG/ML (ref 0–450)
P AXIS - MUSE: 24 DEGREES
PLATELET # BLD AUTO: 368 10E3/UL (ref 150–450)
POTASSIUM BLD-SCNC: 2.9 MMOL/L (ref 3.4–5.3)
PR INTERVAL - MUSE: 152 MS
PROT SERPL-MCNC: 7.4 G/DL (ref 6.8–8.8)
QRS DURATION - MUSE: 94 MS
QT - MUSE: 418 MS
QTC - MUSE: 500 MS
R AXIS - MUSE: -2 DEGREES
RBC # BLD AUTO: 4.59 10E6/UL (ref 3.8–5.2)
SODIUM SERPL-SCNC: 137 MMOL/L (ref 133–144)
SYSTOLIC BLOOD PRESSURE - MUSE: NORMAL MMHG
T AXIS - MUSE: 13 DEGREES
VENTRICULAR RATE- MUSE: 86 BPM
WBC # BLD AUTO: 7.9 10E3/UL (ref 4–11)

## 2022-08-30 PROCEDURE — 84703 CHORIONIC GONADOTROPIN ASSAY: CPT | Performed by: EMERGENCY MEDICINE

## 2022-08-30 PROCEDURE — 85025 COMPLETE CBC W/AUTO DIFF WBC: CPT | Performed by: EMERGENCY MEDICINE

## 2022-08-30 PROCEDURE — 250N000013 HC RX MED GY IP 250 OP 250 PS 637: Performed by: EMERGENCY MEDICINE

## 2022-08-30 PROCEDURE — 93005 ELECTROCARDIOGRAM TRACING: CPT

## 2022-08-30 PROCEDURE — 85379 FIBRIN DEGRADATION QUANT: CPT | Performed by: EMERGENCY MEDICINE

## 2022-08-30 PROCEDURE — 80053 COMPREHEN METABOLIC PANEL: CPT | Performed by: EMERGENCY MEDICINE

## 2022-08-30 PROCEDURE — 83880 ASSAY OF NATRIURETIC PEPTIDE: CPT | Performed by: EMERGENCY MEDICINE

## 2022-08-30 PROCEDURE — 99284 EMERGENCY DEPT VISIT MOD MDM: CPT

## 2022-08-30 PROCEDURE — 36415 COLL VENOUS BLD VENIPUNCTURE: CPT | Performed by: EMERGENCY MEDICINE

## 2022-08-30 RX ORDER — POTASSIUM CHLORIDE 1500 MG/1
20 TABLET, EXTENDED RELEASE ORAL 2 TIMES DAILY
Qty: 6 TABLET | Refills: 0 | Status: SHIPPED | OUTPATIENT
Start: 2022-08-30 | End: 2022-09-02

## 2022-08-30 RX ORDER — POTASSIUM CHLORIDE 1.5 G/1.58G
40 POWDER, FOR SOLUTION ORAL ONCE
Status: COMPLETED | OUTPATIENT
Start: 2022-08-30 | End: 2022-08-30

## 2022-08-30 RX ADMIN — POTASSIUM CHLORIDE 40 MEQ: 1.5 POWDER, FOR SOLUTION ORAL at 03:47

## 2022-08-30 ASSESSMENT — ACTIVITIES OF DAILY LIVING (ADL)
ADLS_ACUITY_SCORE: 33
ADLS_ACUITY_SCORE: 35

## 2022-08-30 NOTE — ED PROVIDER NOTES
History   Chief Complaint:  Leg Swelling     HPI  History supplemented by electronic chart review    Mónica Aly is a 27 year old female with history of type II diabetes mellitus not currently on DM medications and hypertension who presents with bilateral leg swelling. The patient reports that she returned from a brief trip to [place] four days ago, and she has since had recurrent bilateral feet swelling with recent development of tingling in her hands and feet.  No shortness of breath or chest symptoms.  She says that she has had this before with travelling, but it typically resolves after a day or so.  She was seen here a couple months ago for a similar issue, and it improved with a 20 day course of Lasix. She stopped taking this after the swelling resolved, but she started taking it again three days ago after this swelling began. She has never been told to wear compression socks. The patient is concerned as she is diabetic and has hypertension; mentions specific concern for blood clots. She has not been checking her blood sugars recently, and she was told by her endocrinologist that she could stop taking insulin and all DM medications, but she is overdue for a follow up appointment.     Review of Systems   All other systems reviewed and are negative.    Allergies:  No known drug allergies     Medications:  Furosemide  Lisinopril   Ondansetron   Oxycodone   Senna docusate  Amlodipine   Spironolactone  Dapagliflozin  Exenatide  Lorazepam    Past Medical History:     Type II diabetes mellitus  Hypertension  Vitamin D deficiency  Menorrhagia  Anisometropia  Obstructive sleep apnea  Secondary hyperparathyroidism  Hypocalcemia  Bell's palsy  Cholelithiasis      Past Surgical History:    Tonsillectomy  Cholecystectomy  Dental extraction     Social History:  The patient presents to the ED alone  Lives in [place].    Physical Exam     Patient Vitals for the past 24 hrs:   BP Temp Temp src Pulse Resp SpO2 Weight    08/30/22 0513 -- -- -- 93 18 99 % --   08/30/22 0045 (!) 151/90 97.8  F (36.6  C) Temporal 96 16 99 % --   08/30/22 0042 -- -- -- -- -- -- (!) 158.8 kg (350 lb)     Physical Exam  General: Nontoxic-appearing woman sitting upright in room 8  HENT: wearing mask, no major facial deformity noted  CV: rate as above, regular rhythm, mild symmetric lower extremity edema, no JVD, palpable symmetric foot pulses, compartments soft in BLE  Resp: normal effort, speaks in full phrases, no stridor, no cough observed  GI: abdomen soft and nontender, no guarding  MSK: no bony tenderness to lower extremities  Skin: appropriately warm and dry, no erythema, no ecchymosis  Neuro: alert, clear speech, oriented   Psych: cooperative    Emergency Department Course   ECG  ECG taken at 0332, ECG read at 0354  Normal sinus rhythm; voltage criteria for left ventricular hypertrophy; prolonged QT   New prolonged QT as compared to prior, dated 5/18/22.  Rate 86 bpm. OH interval 152 ms. QRS duration 94 ms. QT/QTc 418/500 ms. P-R-T axes 24 -2 13.      Laboratory:  Labs Ordered and Resulted from Time of ED Arrival to Time of ED Departure   COMPREHENSIVE METABOLIC PANEL - Abnormal       Result Value    Sodium 137      Potassium 2.9 (*)     Chloride 102      Carbon Dioxide (CO2) 26      Anion Gap 9      Urea Nitrogen 10      Creatinine 0.60      Calcium 8.5      Glucose 193 (*)     Alkaline Phosphatase 85      AST 17      ALT 26      Protein Total 7.4      Albumin 3.6      Bilirubin Total 0.3      GFR Estimate >90     NT PROBNP INPATIENT - Normal    N terminal Pro BNP Inpatient 30     D DIMER QUANTITATIVE - Normal    D-Dimer Quantitative <0.27     HCG QUALITATIVE PREGNANCY - Normal    hCG Serum Qualitative Negative     CBC WITH PLATELETS AND DIFFERENTIAL    WBC Count 7.9      RBC Count 4.59      Hemoglobin 13.1      Hematocrit 39.4      MCV 86      MCH 28.5      MCHC 33.2      RDW 13.0      Platelet Count 368      % Neutrophils 60      % Lymphocytes  30      % Monocytes 7      % Eosinophils 2      % Basophils 0      % Immature Granulocytes 1      NRBCs per 100 WBC 0      Absolute Neutrophils 4.7      Absolute Lymphocytes 2.4      Absolute Monocytes 0.5      Absolute Eosinophils 0.1      Absolute Basophils 0.0      Absolute Immature Granulocytes 0.1      Absolute NRBCs 0.0         Emergency Department Course:    Reviewed:  I reviewed nursing notes, vitals, past medical history and Care Everywhere    Assessments:  0312 I obtained history and examined the patient as noted above.   0456 I rechecked the patient and explained findings.     Interventions:  Medications   potassium chloride (KLOR-CON) Packet 40 mEq (40 mEq Oral Given 8/30/22 0347)     Disposition:  The patient was discharged to home.     Impression & Plan     Medical Decision Making:  She presents with recurrent bilateral leg swelling, which he has had intermittently for some time, and was evaluated for the possibility of DVT, CHF exacerbation, major renal impairment and other possibly dangerous causes, though fortunately this is benign.  She does have modest hypokalemia, perhaps due to recent Lasix use.  Discussed with her that using the last day or 2 of her Lasix is not unreasonable, but I do not think this is a primary cardiac process, and given her hypokalemia I do not think that extending a Lasix prescription is indicated at this time.  Compression stockings may be of some benefit, along with elevation.  She would benefit from weight loss as well which may improve not only her leg swelling but also overall health.  No evidence of infection such as septic joint or cellulitis.  No evidence of arterial compromise, with good foot pulses present bilaterally.  She is neurovascularly intact.  The diagnostic uncertainty as well as reassurances of today's evaluation were discussed with this very pleasant patient who agrees with outpatient management and follow-up as advised, returning to care for sudden  worsening at any hour.    Diagnosis:    ICD-10-CM    1. Leg swelling  M79.89    2. Hypokalemia  E87.6    3. Hyperglycemia  R73.9        Discharge Medications:  Discharge Medication List as of 8/30/2022  5:06 AM      START taking these medications    Details   !! potassium chloride ER (KLOR-CON M) 20 MEQ CR tablet Take 1 tablet (20 mEq) by mouth 2 times daily for 3 days, Disp-6 tablet, R-0, Local Print       !! - Potential duplicate medications found. Please discuss with provider.          Scribe Disclosure:  I, Aye Caballero, am serving as a scribe at 3:09 AM on 8/30/2022 to document services personally performed by Amrik Enrique MD based on my observations and the provider's statements to me.            Amrik Enrique MD  08/30/22 0704

## 2022-08-30 NOTE — ED TRIAGE NOTES
Recent flight from [place], pt reports swelling to bilateral feet since Friday - pt normally takes lasix for this and it resolves, but not this time. Pt has taken lasix daily for past three days with no relief. Pt states elevating her feet had helped, but as soon as she started walking on it again, the swelling came back.      Triage Assessment     Row Name 08/30/22 0043       Respiratory WDL    Respiratory WDL WDL       Cardiac WDL    Cardiac WDL WDL       Cognitive/Neuro/Behavioral WDL    Cognitive/Neuro/Behavioral WDL WDL

## 2022-11-15 ENCOUNTER — VIRTUAL VISIT (OUTPATIENT)
Dept: ENDOCRINOLOGY | Facility: CLINIC | Age: 27
End: 2022-11-15
Payer: COMMERCIAL

## 2022-11-15 VITALS — HEIGHT: 68 IN | WEIGHT: 293 LBS | BODY MASS INDEX: 44.41 KG/M2

## 2022-11-15 DIAGNOSIS — G47.33 OBSTRUCTIVE SLEEP APNEA: Primary | ICD-10-CM

## 2022-11-15 DIAGNOSIS — E66.01 CLASS 3 SEVERE OBESITY WITH SERIOUS COMORBIDITY AND BODY MASS INDEX (BMI) OF 50.0 TO 59.9 IN ADULT, UNSPECIFIED OBESITY TYPE (H): ICD-10-CM

## 2022-11-15 DIAGNOSIS — E66.813 CLASS 3 SEVERE OBESITY WITH SERIOUS COMORBIDITY AND BODY MASS INDEX (BMI) OF 50.0 TO 59.9 IN ADULT, UNSPECIFIED OBESITY TYPE (H): ICD-10-CM

## 2022-11-15 DIAGNOSIS — E11.9 CONTROLLED TYPE 2 DIABETES MELLITUS WITHOUT COMPLICATION, WITHOUT LONG-TERM CURRENT USE OF INSULIN (H): ICD-10-CM

## 2022-11-15 PROBLEM — R11.2 NAUSEA AND VOMITING: Status: RESOLVED | Noted: 2020-03-11 | Resolved: 2022-11-15

## 2022-11-15 PROBLEM — I10 HYPERTENSION: Status: ACTIVE | Noted: 2018-08-15

## 2022-11-15 PROCEDURE — 99205 OFFICE O/P NEW HI 60 MIN: CPT | Mod: 95

## 2022-11-15 RX ORDER — SPIRONOLACTONE 25 MG/1
25 TABLET ORAL
COMMUNITY
Start: 2022-05-12 | End: 2023-05-12

## 2022-11-15 RX ORDER — SEMAGLUTIDE 1.34 MG/ML
0.5 INJECTION, SOLUTION SUBCUTANEOUS
COMMUNITY
Start: 2022-10-26

## 2022-11-15 ASSESSMENT — PAIN SCALES - GENERAL: PAINLEVEL: NO PAIN (0)

## 2022-11-15 NOTE — ASSESSMENT & PLAN NOTE
Currently well controlled on Ozempic 0.5mg. Getting CGM next week to start checking blood sugar daily. Followed by endocrine. Last A1C 6.8 on 8/2022.

## 2022-11-15 NOTE — Clinical Note
PHIL, jyothi, Dr. Mohan. No concerns.  Laura/Manolo - she has [employer]. I discussed with her she might need to complete 6 months of dietitian visits prior to surgery date. When she reached out to her insurance they made no mention. Do you mind reaching out to her to clarify? (Unsure who would best help with this, sorry!)   Tesha - please send clearance/LOS, psych eval, maurisio info. Thank you!

## 2022-11-15 NOTE — ASSESSMENT & PLAN NOTE
Onset obesity in childhood. Weight gain increased through college.     Comorbidities of HTN, SARANYA, and DMII. Previously was noncompliant with medication. Has been compliant with no concerns for the past 6 months.     Currently taking ozempic for type II diabetes. Is currently at 0.5mg with no concerns. Denies side effects. Followed by endocrine. Plan to increase to 2.0mg.

## 2022-11-15 NOTE — LETTER
"11/15/2022       RE: Mónica Aly  [address]  [place] [address]     Dear Colleague,    Thank you for referring your patient, Mónica Aly, to the [facility] [place] at [facility]. Please see a copy of my visit note below.    Mónica Aly is a 27 year old who is being evaluated via a billable video visit.      How would you like to obtain your AVS? MyChart  If the video visit is dropped, the invitation should be resent by: Text to cell phone: [phone]  Will anyone else be joining your video visit? No  If patient encounters technical issues they should call [phone]    During this virtual visit the patient is located in [place], patient verifies this as the location during the entirety of this visit.     Video-Visit Details  Video Start Time: 10:00AM    Type of service:  Video Visit    Video End Time:10:58AM    Originating Location (pt. Location): Home  Distant Location (provider location):  Off-site  Platform used for Video Visit: IPLogic    70 minutes spent on the date of the encounter doing chart review, history and exam, documentation and further activities per the note    New Bariatric Surgery Consultation Note    November 15, 2022    RE: Mónica Aly  MR#: [id number]  : 1995      Referring provider: No flowsheet data found.    Chief Complaint/Reason for visit: evaluation for possible weight loss surgery    Dear Ximena Og (General),    I had the pleasure of seeing your patient, Mónica Aly, to evaluate her obesity and consider her for possible weight loss surgery. As you know, Mónica Aly is 27 year old.  She has a height of 5' 8\", a weight of 350 lbs 0 oz, and calculated Body mass index is 53.22 kg/m .    Assessment & Plan  Problem List Items Addressed This Visit        Respiratory    Obstructive sleep apnea - Primary    Relevant Orders    Adult Sleep Eval & Management "  Referral       Digestive    Class 3 severe obesity with serious comorbidity and body mass index (BMI) of 50.0 to 59.9 in adult (H)     Onset obesity in childhood. Weight gain increased through college.     Comorbidities of HTN, SARANYA, and DMII. Previously was noncompliant with medication. Has been compliant with no concerns for the past 6 months.     Currently taking ozempic for type II diabetes. Is currently at 0.5mg with no concerns. Denies side effects. Followed by endocrine. Plan to increase to 2.0mg.          Relevant Medications    OZEMPIC, 0.25 OR 0.5 MG/DOSE, 2 MG/1.5ML SOPN pen    Other Relevant Orders    CBC with platelets    Comprehensive metabolic panel    Hemoglobin A1c    Lipid panel reflex to direct LDL Fasting    Parathyroid Hormone Intact    Vitamin D Deficiency    Med Therapy Management Referral    Adult Sleep Eval & Management  Referral       Endocrine    Diabetes type 2, controlled (H)     Currently well controlled on Ozempic 0.5mg. Getting CGM next week to start checking blood sugar daily. Followed by endocrine. Last A1C 6.8 on 8/2022.          Relevant Medications    OZEMPIC, 0.25 OR 0.5 MG/DOSE, 2 MG/1.5ML SOPN pen    Other Relevant Orders    Med Therapy Management Referral      1. Labs ordered   2. Continue Ozempic 0.5mg. Increase per endocrine  3. Schedule Psych Eval   4. Letter of Support/Clearance:   - Sleep - referral placed  - Endocrine   - PCP   5. Follow up with dietitian at least 3 times, and then monthly until surgery. Reached out to And[facility] to confirm if 6 months needed per insurance.   6. Follow up with Dr. Mohan in 1-2 months  7. Follow up with Gladys Clifton in 3 months   8. MTM referral placed to be followed up in 1 month       HISTORY OF PRESENT ILLNESS:  Weight Loss History Reviewed with Patient 11/14/2022   How long have you been overweight? Since early childhood   What is the most that you have ever weighed? 350   What is the most weight you have lost? 40  "  I have tried the following methods to lose weight Watching portions or calories, Exercise, Atkins type diet (low carb/high protein), Pre packaged meals ex: Nutrisystem, Prescription Medications   I have tried the following weight loss medications? (Check all that apply) Xenical/Orlistat/Imtiaz, Phentermine/Adipex-p/Suprenza, Metformin   Have you ever had weight loss surgery? No     Onset of obesity since childhood. Increased weight through college. Gradual weight gain.    Has tried to lose weight previously withmedications, diet and exercise. Has not felt that any of life changes are sustainable or would not see progress. Lost 40lbs after started DMII meds and restrictive diet. Weight gain started to affect health since 2019 - diagnosed with DMII and HTN. Feels that weight is \"wearing\" on body. Feels like she has exhausted all options. Is concerned with how it is now affecting health.     Weight today is 350lbs - highest in life.     Eating 2 meals a day, 1-2 snacks. Will get full, but struggle to stay full. Eating out 2-3x week. She will buy her own food and meals. Lives at home, but mom is working more so eating out more.   Breakfast - skips   Lunch - turkey sandwhich or eating out - sub or chipotle   Dinner - order in, pasta, rice, - largest meal of day   Snacks - savory, chips, crackers, string cheese, popcorn, pretzels.     Activety - has had increased swelling in ankles and feet. Trying to walk more - 2-3xweek.        AOMs:   Phentermine - had some weight loss. No longer on.    Imtiaz - wasn't on long enough or consistent enough   Ozempic - currently has been taking it for 1 month. Has seen some hunger control     CO-MORBIDITIES OF OBESITY INCLUDE:     11/14/2022   I have the following health issues associated with obesity: Type II Diabetes, High Blood Pressure, High Cholesterol, Sleep Apnea   DMII - On ozempic. Not currently checking BS. Getting a nichole next week.     HTN - Currently well controlled with " medication. Has struggled with compliance in the past, but not currently.     SARANYA - does not have CPAP. Does not have sleep provider    HA - worse with menstrual cycle. Managed with tylenol and ibuprophen.     History of bleeding or clotting disorder? No  Immunologic or biologic med? No    PAST MEDICAL HISTORY:  Past Medical History:   Diagnosis Date     Bell palsy April 2014     Cholecystolithiasis      Chronic cough      Diabetes (H)      Gallstones      Hypertension      Nausea and vomiting 3/11/2020     Obese    Bell palsy - residual facial drooping     PAST SURGICAL HISTORY:  Past Surgical History:   Procedure Laterality Date     ENT SURGERY      tonsillectomy     ENT SURGERY      wisdom teeth extraction     LAPAROSCOPIC CHOLECYSTECTOMY N/A 3/18/2020    Procedure: LAPAROSCOPIC CHOLECYSTECTOMY;  Surgeon: Jasper Becker MD;  Location:  OR     [facility] NONSPECIFIC PROCEDURE      Tonsillectomy   Cholysyectomy - 2020     FAMILY HISTORY:   No family history on file.    SOCIAL HISTORY:   Social History Questions Reviewed With Patient 11/14/2022   Which best describes your employment status (select all that apply) I work full-time   Which best describes your marital status: single   Do you have children? No   Who do you have in your support network that can be available to help you for the first 2 weeks after surgery? Mom, sister, brohter   Who can you count on for support throughout your weight loss surgery journey? Mom, sister, brother   Can you afford 3 meals a day?  Yes   Can you afford 50-60 dollars a month for vitamins? Yes    - works 12 hour days. Works from home, mostly desk work.    HABITS:     11/14/2022   How often do you drink alcohol? Never   Have you ever used any of the following nicotine products? No   Have you or are you currently using street drugs or prescription strength medication for which you do not have a prescription for? No   Do you have a history of chemical dependency (alcohol or  drug abuse)? No     Currently taking narcotic/opioids No    PSYCHOLOGICAL HISTORY:   Psychological History Reviewed With Patient 11/14/2022   Have you ever attempted suicide? Never.   Have you had thoughts of suicide in the past year? No   Have you ever been hospitalized for mental illness or a suicide attempt? Never.   Do you have a history of chronic pain? No   Have you ever been diagnosed with fibromyalgia? No   Are you currently seeing a therapist or counselor?  No   Are you currently seeing a psychiatrist? No       ROS:     11/14/2022   Skin:  None of the above   HEENT: Headaches   Musculoskeletal: Swelling of legs   Cardiovascular: Shortness of breath with activity   Pulmonary: Snoring, People have told me I stop breathing while asleep, Experience morning headaches   Gastrointestinal: None of the above   Genitourinary: None of the above   Hematological: None of the above   Neurological: Migraine headaches   Female only: None of the above       EATING BEHAVIORS:     11/14/2022   Have you or anyone else thought that you had an eating disorder? No   Do you currently binge eat (eat a large amount of food in a short time)? Yes   Are you an emotional eater? No   Do you get up to eat after falling asleep? No       EXERCISE:     11/14/2022   How often do you exercise? Less than 1 time per week   What is the duration of your exercise (in minutes)? 30 Minutes   What types of exercise do you do? walking, climbing stairs at work   What keeps you from being more active?  I should be more active but I just have not gotten around to it, Shortness of breath, Too tired       MEDICATIONS:  Current Outpatient Medications   Medication Sig Dispense Refill     acetaminophen (TYLENOL) 325 MG tablet Take 325-650 mg by mouth every 6 hours as needed for headaches       alcohol swab prep pads Use to swab area of injection/hola as directed. 100 each 3     amLODIPine (NORVASC) 10 MG tablet Take 10 mg by mouth At Bedtime        blood  glucose (NO BRAND SPECIFIED) test strip Use to test blood sugar 4 times daily or as directed. To accompany: Blood Glucose Monitor Brands: per insurance. 100 strip 6     blood glucose calibration (NO BRAND SPECIFIED) solution To accompany: Blood Glucose Monitor Brands: per insurance. 1 Bottle 3     blood glucose monitoring (NO BRAND SPECIFIED) meter device kit Use to test blood sugar 4 times daily or as directed. Preferred blood glucose meter OR supplies to accompany: Blood Glucose Monitor Brands: per insurance. 1 kit 0     ibuprofen (ADVIL/MOTRIN) 600 MG tablet Take 1 tablet (600 mg) by mouth every 6 hours as needed for moderate pain 45 tablet 1     lisinopril (PRINIVIL/ZESTRIL) 40 MG tablet Take 1 tablet (40 mg) by mouth daily 30 tablet 0     LORazepam (ATIVAN) 0.5 MG tablet Take 0.5 mg by mouth every 6 hours as needed for anxiety       OZEMPIC, 0.25 OR 0.5 MG/DOSE, 2 MG/1.5ML SOPN pen Inject 0.5 mg Subcutaneous every 7 days       potassium chloride ER (KLOR-CON M) 10 MEQ CR tablet Take 10 mEq by mouth 2 times daily       spironolactone (ALDACTONE) 25 MG tablet Take 25 mg by mouth       thin (NO BRAND SPECIFIED) lancets Use with lanceting device. To accompany: Blood Glucose Monitor Brands: per insurance. 100 each 6       ALLERGIES:  Allergies   Allergen Reactions     No Known Allergies              Anti-obesity medication ROS:    HEENT  Hx of glaucoma: No    Cardiovascular  CAD:No  HTN:Yes    Gastrointestinal  GERD:No  Constipation:No  Liver Dz:No  H/O Pancreatitis:No    Psychiatric  Bipolar: No  Anxiety:No  Depression:No  History of alcohol/drug abuse: No  Hx of eating disorder:No    Endocrine  Personal or family hx of MTC or MEN2:No  Diabetes/prediabetes: Yes    Neurologic:  Hx of seizures: Yes when 5 years old due to fever  Hx of migraines: Yes  Memory Impairment: No      History of kidney stones: No  Kidney disease: No  Current birth control: No not currently sexually active         Objective    Vitals -  Patient Reported  Pain Score: No Pain (0)      Vitals:  No vitals were obtained today due to virtual visit.    Physical Exam     GENERAL: Healthy, alert and no distress  EYES: Eyes grossly normal to inspection.  No discharge or erythema, or obvious scleral/conjunctival abnormalities.  RESP: No audible wheeze, cough, or visible cyanosis.  No visible retractions or increased work of breathing.    SKIN: Visible skin clear. No significant rash, abnormal pigmentation or lesions.  NEURO: Cranial nerves grossly intact.  Mentation and speech appropriate for age.  PSYCH: Mentation appears normal, affect normal/bright, judgement and insight intact, normal speech and appearance well-groomed.      In summary, Mónica Aly has Class III obesity with a body mass index of Body mass index is 53.22 kg/m . kg/m2 and the comorbidities stated above. She completed an informational seminar and is a possible candidate for the laparoscopic gastric sleeve.  She will have to complete the following pre-requisites:    Received weight loss goal of 35 lb prior to surgery.  Achieve clearance from dietitian to see surgeon.  Have preoperative laboratory tests drawn.  Psychological Evaluation with MMPI and clearance for weight loss surgery.  Letter of clearance from the following PCP, sleep, endocrine    Today in the office we discussed gastric sleeve surgery. Preoperative, perioperative, and postoperative processes, management, and follow up were addressed.  Risks and benefits were outlined including the risk of death, staple line leak (1-2%), PE, DVT, ulcer, worsening GERD, N/V, stricture, hernia, wound infection, weight regain, and vitamin deficiencies. I emphasized exercise and activity along with appropriate food choice as the main foundation for weight loss with surgery providing surgical reinforcement of this.  All questions were answered.  A goal sheet and support group handout were given to the patient.        If you have not already  watched our online seminar please go to [email or web address]    Weight loss requirement: 35lbs prior to surgery. Will have final weight check 2-3 weeks prior to surgery at anesthesia or nurse pre-op teaching visit.    -Need current weight confirmation at primary clinic or weight management clinic Yes    Bariatric labs ordered, call for a lab only appointment at any [facility] lab. To find a lab location near you, please call [phone]. Please let us know if orders need to be faxed to a non [facility] lab.    Schedule bariatric psych eval as soon as possible.  List of psychologists will be sent to you via [employer] or given to you in clinic.     Call Manolo Medina at [phone] to discuss insurance coverage for bariatric surgery.  Please check with your insurance regarding bariatric surgery coverage also. Manolo can also help you with scheduling psych eval if you are having difficulties.    The following clearance letters are needed: Letter templates will be sent to you via [employer] or given to you in clinic. Providers can submit through electronic medical record or fax to [phone].  - Letter of support from primary care provider.   - Endocrine   - Sleep     Smoking cessation and nicotine test needed: No    Birth control after surgery discussed. Patient instructed that 2 forms of birth control required after surgery and to avoid pregnancy for at least 18 months after surgery: Not currently on birth control and not currently sexually active, will reevaluate if this changes.     NEXT VISITS: A  should reach out to you to schedule the following appointments.  If they do not reach you please call [phone] to schedule the following appointments:    -See dietitian in 1 month and monthly for 3 months    -See Lauren Bloch [facility] pharmacist in 1 month to follow up on weight loss medications    -See Gladys Clifton in 3 months to follow up on pre-op weight loss and weight loss medications    -See  Dr Mohan in 1 month for bariatric surgeon visit. Discuss bariatric surgery.       Once the patient has completed the requirements in their task list and there are no further recommendations, the pt will be allowed to see the surgeon of their choice for consultation on the laparoscopic gastric sleeve surgery. Patient verbalizes understanding of the process to surgery and expectations for the postoperative period including the need for lifelong lifestyle changes, vitamin supplementation, and laboratory monitoring.    Sincerely,     KENNEY FRAGA PA-C

## 2022-11-15 NOTE — PATIENT INSTRUCTIONS
"Thank you for allowing us the privilege of caring for you. We hope we provided you with the excellent service you deserve.   Please let us know if there is anything else we can do for you so that we can be sure you are completely satisfied with your care experience.    To ensure the quality of our services you may be receiving a patient satisfaction survey from an independent patient satisfaction monitoring company.    The greatest compliment you can give is a \"Likely to Recommend\"    Your visit was with KENNEY FRAGA PA-C today.    Instructions per today's visit:     Heriberto Aly, it was great to visit with you today.  Here is a review of our visit.  If our clinic scheduler is not able to reach you please call [phone] to schedule your next appointments.    If you have not already watched our online seminar please go to [email or web address]    Weight loss requirement: 35lbs prior to surgery. Will have final weight check 2-3 weeks prior to surgery at anesthesia or nurse pre-op teaching visit.    -Need current weight confirmation at primary clinic or weight management clinic Yes    Bariatric labs ordered, call for a lab only appointment at any [facility] lab. To find a lab location near you, please call [phone]. Please let us know if orders need to be faxed to a non [facility] lab.    Schedule bariatric psych eval as soon as possible.  List of psychologists will be sent to you via stickapps or given to you in clinic.     Call Manolo Medina at [phone] to discuss insurance coverage for bariatric surgery.  Please check with your insurance regarding bariatric surgery coverage also. Manolo can also help you with scheduling psych eval if you are having difficulties.    The following clearance letters are needed: Letter templates will be sent to you via stickapps or given to you in clinic. Providers can submit through electronic medical record or fax to [phone].  - Letter of support " from primary care provider.   - Endocrine   - Sleep     Smoking cessation and nicotine test needed: No    Birth control after surgery discussed. Patient instructed that 2 forms of birth control required after surgery and to avoid pregnancy for at least 18 months after surgery    NEXT VISITS: A  should reach out to you to schedule the following appointments.  If they do not reach you please call [phone] to schedule the following appointments:    -See dietitian in 1 month and monthly for 3 months    -See Lauren Bloch MTM pharmacist in 1 month to follow up on weight loss medications    -See Gladys Clifton in 3 months to follow up on pre-op weight loss and weight loss medications    -See Dr Mohan in 1 month for bariatric surgeon visit. Discuss bariatric surgery.             Information about Video Visits with [employer] [employer]: video visit information  _________________________________________________________________________________________________________________________________________________________  If you are asked by your clinic team to have your blood pressure checked:  [facility] do offer several locations for blood pressure checks. Please follow the below link to schedule an appointment. Scheduling an appointment at the pharmacy for a blood pressure check is now preferred.    Appointment Plus ([email or web address])  _________________________________________________________________________________________________________________________________________________________  Important contact and scheduling information:  Please call our contact center at [phone] to schedule your next appointments.  To find a lab location near you, please call [phone].  For any nursing questions or concerns call Destinee Todd LPN at [phone] or Toyin Luna RN at [phone]  Please call during clinic hours Monday through Friday 8:00a - 4:00p if you have questions or you can contact us via iLumenhart at  anytime and we will reply during clinic hours.    Lab results will be communicated through My Chart or letter (if My Chart not used). Please call the clinic if you have not received communication after 1 week or if you have any questions.?  Clinic Fax: [phone]    _________________________________________________________________________________________________________________________________________________________  Meal Replacement Products:    Here is the link to our new e-store where you can purchase our meal replacement products    [employer] E-Store  Shustir/store    The one week starter kit is a great way to sample a variety of products and see what works for you.    If you want more information about the product go to: [email or web address].Net Orange    If you are an employee or [facility] or [employer] please contact your care team for a 10% estore discount    Free Shipping for orders over $75     Benefits of meal replacements products:    Portion and calorie control  Improved nutrition  Structured eating  Simplified food choices  Avoid contact with trigger foods  _________________________________________________________________________________________________________________________________________________________  Interested in working with a health ?  Health coaches work with you to improve your overall health and wellbeing.  They look at the whole person, and may involve discussion of different areas of life, including, but not limited to the four pillars of health (sleep, exercise, nutrition, and stress management). Discuss with your care team if you would like to start working a health .  Health Coaching-3 Pack: Schedule by calling [phone]    $99 for three health coaching visits    Visits may be done in person or via phone    Coaching is a partnership between the  and the client; Coaches do not prescribe or diagnose    Coaching  helps inspire the client to reach his/her personal goals   _________________________________________________________________________________________________________________________________________________________  24 Week Healthy Lifestyle Plan:    Our mission in the 24-week Healthy Lifestyle Plan is to provide you with individualized care by giving you the tools, education and support you need to lose weight and maintain a healthy lifestyle. In your 24-week journey, you ll be supported by a dedicated weight loss team that includes registered dietitians, medical weight management providers, health coaches, and nurses -- all with special expertise in weight loss -- to help you every step of the way.     Monthly meetings with your registered dietician or medical weight management provider help to review your progress, update your care plan, and make any adjustments needed to ensure success. Between these visits, weekly and bi-weekly health  visits will help you focus on the four pillars of weight loss -- stress, sleep, nutrition, and exercise -- and how you can best adapt each to achieve sustainable weight loss results.    In addition, you will be given exclusive access to online wellbeing classes through [employer].  Your initial visit will be with a medical weight management provider who will help to understand your weight loss goals and ensure this program is the right fit for you. Please let our team know if you are interested in the 24 week plan by sending a message to your care team or calling [phone] to schedule.  _________________________________________________________________________________________________________________________________________________________    COMPREHENSIVE WEIGHT MANAGEMENT PROGRAM  VIRTUAL SUPPORT GROUPS    For Support Group Information:      We offer support groups for patients who are working on weight loss and considering, preparing for or have had weight loss surgery.    "There is no cost for this opportunity.  You are invited to attend the?Virtual Support Groups?provided by any of the following locations:    [facility] via Microsoft Teams with Marlys Tay RN  2.   [place] via Plink Search with Da Gregg, PhD, LP  3.   [place] via Plink Search with Cristina Horta RN  4.   [facility] via oDesk Teams with Cristina Palacios [facility]    The following Support Group information can also be found on our website:  [email or web address]    [facility] Weight Loss Surgery Support Group    [facility] Weight Loss Surgery Support Group  The support group is a patient-lead forum that meets monthly to share experiences, encouragement and education. It is open to those who have had weight loss surgery, are scheduled for surgery, and those who are considering surgery.   WHEN: This group meets on the 3rd Wednesday of each month from 5:00PM - 6:00PM virtually using Microsoft Teams.   FACILITATOR: Led by Marlys Tay, ELOISE, LD, RN, the program's Clinical Coordinator.   TO REGISTER: Please contact the clinic via HRBoss or call the nurse line directly at [phone] to inform our staff that you would like an invite sent to you and to let us know the email you would like the invite sent to. Prior to the meeting, a link with directions on how to join the meeting will be sent to you.    2022 Meetings  Patience 15: \"Let's Talk\" a time for the group to share.  July 20: \"Let's Talk\" a time for the group to share.  August 17: \"Let's Talk\" a time for the group to share.  September 21: \"Let's Talk\" a time for the group to share.  October 19: Guest Speaker: Dr Kulwinder Perez MD Pulmonologist and Sleep Medicine Physician, \"Getting a Good Night's Sleep\".  November 16: \"Let's Talk\" a time for the group to share.  December 21: \"Let's Talk\" a time for the group to share.    [facility] - " "[place] Support Groups    Connections: Bariatric Care Support Group?  This is open to all [facility] (and those external to this program) pre- and post- operative bariatric surgery patients as well as their support system.   WHEN: This group meets the 2nd Tuesday of each month from 6:30 PM - 8:00 PM virtually using Microsoft Teams.   FACILITATOR: Led by Da Gregg, Ph.D who is a Licensed Psychologist with the [facility] Comprehensive Weight Management Program.   TO REGISTER: Please send an email to Da Gregg, Ph.D., LP at?[email or web address]?if you would like an invitation to the group and to learn about using Microsoft Teams.    2022 Meetings June 14: Camila Flores, ELOISE, LD at [facility], \"Nutritional Labeling\"  July 12 August 2 (Please Note Date Change)  September 13 October 11 November 8 December 13    Connections: Post-Operative Bariatric Surgery Support Group  This is a support group for [facility] bariatric patients (and those external to [facility]) who have had bariatric surgery and are at least 3 months post-surgery.  WHEN: This support group meets the 4th Wednesday of the month from 11:00 AM - 12:00 PM virtually using Microsoft Teams.   FACILITATOR: Led by Certified Bariatric Nurse, Cristina Horta RN.   TO REGISTER: Please send an email to Cristina at [email or web address] if you would like an invitation to the group and to learn about using Microsoft Teams.    2022 Meetings June 22 July 27 August 24 September 28 October 26 November 23 December 28      [facility] Healthy Lifestyle Virtual Support Group    Healthy Lifestyle Virtual Support Group?  This is 60 minutes of small group guided discussion, support and resources. All are welcome who want a healthy lifestyle.  WHEN: This group meets monthly on a Friday from 12:30 PM - 1:30 PM virtually using Microsoft Teams.   FACILITATOR: Led by National Board " "Certified Health and , Cristina Palacios [facility].   TO REGISTER: Please send an email to Cristina at?[email or web address] to receive monthly invites to the group or if you have any questions about having a health .  Prior to the meeting, a link with directions on how to join the meeting will be sent to you.    2022 Meetings  June 24: Cristina Palacios [facility], \"Setting Limits and Boundaries\".  Jul 29: Open Forum  August 26: Guest Speaker: Mari Bean Registered Dietitian  September 30: Open Forum  October 28th: Guest Speaker: Emani Amato [facility], Health , \"Gratitude Practices\".  November 18: Guest Speaker: Chanda Paredes RD Registered Dietitian, \"Navigating How to Eat around the Holidays\".  December 16: Guest Speaker: Jackie Panda, [facility], \"Changing Your Relationship with Movement\".    ____________________________________________________________________________________________________________________________________________________________________________  [place] [facility] Get Moving Program  Our team of physical therapists is trained to help you understand and take control of your condition. They will perform a thorough evaluation to determine your ability for activity and develop a customized plan to fit your goals and physical ability.  Scheduling: Unsure if the Get Moving program is right for you? Discuss the program with your medical provider or diabetes educator. You can also call us at [phone] to ask questions or schedule an appointment.   [facility] Get Moving Program  ____________________________________________________________________________________________________________________________________________________________________________  [facility] Diabetes Prevention Program (DPP)  If you have prediabetes and Medicare please contact us via MyChart to learn more about the Diabetes Prevention Program (DPP)  Program Details:  [facility] offers the year-long " Diabetes Prevention Program (DPP). The program helps you to make lifestyle changes that prevent or delay type 2 diabetes by supporting healthy eating, increased physical activity, stress reduction and use of coping skills.   On average, previous [facility] DPP cohorts have lost and maintained at least 5% of their starting weight throughout the program and averaged more than 150 minutes of physical activity per week.  Participants meet weekly for one-hour group sessions over sixteen weeks, every other week for the next 8 weeks, and monthly for the last six months.   A year-long maintenance program is also available for participants who complete the first year.   Location & Cost:   During the COVID-19 Public Health Emergency, the program is offered virtually. When in-person classes can resume, they will be held at [facility].  For people with Medicare, the program is covered in full. A self-pay option will also be available for those with non-Medicare insurance plans.   _________________________________________________________________________________________________________________________________________________________  Bluetooth Scale:    We hope to provide you with high quality virtual healthcare visits while social distancing for COVID-19 is necessary, as well as in the future when virtual visits may be more convenient for you.     Our technology team made it possible for Bluetooth scales to send weight measurements to our electronic medical record. This allows weights from you weighing at home to securely flow into the medical record, which will improve telephone and virtual visits.   Additionally, studies have shown that adults actually lose more weight when their weights are automatically sent to someone else, and also that this process is not stressful for those adults.    Below is a link for purchasing the scale, with a discount code for our patients. You may call your  insurance company to see if they will reimburse you for the cost of the scale, as a piece of durable medical equipment. The scales only go up to a weight of 400 pounds. This is an issue and we are working with the developer on increasing this. We found no scales that go over 400lb that have blue-tooth for connecting to [employer]agent.    Scale to purchase: the [employer]  Body  Scale: [email or web address]    Discount Code: We have a discount code for our patients to bring the cost down to $50, Discount code is: [place]_Scale_20%off  _______________________________________________________________________________________________________________________________________________________________________________    To work with a Behavioral Health Psychologist:    Call to schedule:    Torres Kim - [phone]  Jackie Wang - [phone]  Christainne Dos Santos - [phone]  Lourdes Quigley - [phone]   Beatriz Azul PhD (cannot accept Medicare) [phone]        Thank you,   [facility] Comprehensive Weight Management Team

## 2022-11-15 NOTE — PROGRESS NOTES
"Mónica Aly is a 27 year old who is being evaluated via a billable video visit.      How would you like to obtain your AVS? MyChart  If the video visit is dropped, the invitation should be resent by: Text to cell phone: [phone]  Will anyone else be joining your video visit? No  If patient encounters technical issues they should call [phone]    During this virtual visit the patient is located in [place], patient verifies this as the location during the entirety of this visit.     Video-Visit Details  Video Start Time: 10:00AM    Type of service:  Video Visit    Video End Time:10:58AM    Originating Location (pt. Location): Home  Distant Location (provider location):  Off-site  Platform used for Video Visit: QBE    70 minutes spent on the date of the encounter doing chart review, history and exam, documentation and further activities per the note    New Bariatric Surgery Consultation Note    November 15, 2022    RE: Mónica Aly  MR#: [id number]  : 1995      Referring provider: No flowsheet data found.    Chief Complaint/Reason for visit: evaluation for possible weight loss surgery    Dear Ximena Og (General),    I had the pleasure of seeing your patient, Mónica Aly, to evaluate her obesity and consider her for possible weight loss surgery. As you know, Mónica Aly is 27 year old.  She has a height of 5' 8\", a weight of 350 lbs 0 oz, and calculated Body mass index is 53.22 kg/m .    Assessment & Plan   Problem List Items Addressed This Visit        Respiratory    Obstructive sleep apnea - Primary    Relevant Orders    Adult Sleep Eval & Management  Referral       Digestive    Class 3 severe obesity with serious comorbidity and body mass index (BMI) of 50.0 to 59.9 in adult (H)     Onset obesity in childhood. Weight gain increased through college.     Comorbidities of HTN, SARANYA, and DMII. Previously was noncompliant with medication. Has been compliant with no concerns " for the past 6 months.     Currently taking ozempic for type II diabetes. Is currently at 0.5mg with no concerns. Denies side effects. Followed by endocrine. Plan to increase to 2.0mg.          Relevant Medications    OZEMPIC, 0.25 OR 0.5 MG/DOSE, 2 MG/1.5ML SOPN pen    Other Relevant Orders    CBC with platelets    Comprehensive metabolic panel    Hemoglobin A1c    Lipid panel reflex to direct LDL Fasting    Parathyroid Hormone Intact    Vitamin D Deficiency    Med Therapy Management Referral    Adult Sleep Eval & Management  Referral       Endocrine    Diabetes type 2, controlled (H)     Currently well controlled on Ozempic 0.5mg. Getting CGM next week to start checking blood sugar daily. Followed by endocrine. Last A1C 6.8 on 8/2022.          Relevant Medications    OZEMPIC, 0.25 OR 0.5 MG/DOSE, 2 MG/1.5ML SOPN pen    Other Relevant Orders    Med Therapy Management Referral      1. Labs ordered   2. Continue Ozempic 0.5mg. Increase per endocrine  3. Schedule Psych Eval   4. Letter of Support/Clearance:   - Sleep - referral placed  - Endocrine   - PCP   5. Follow up with dietitian at least 3 times, and then monthly until surgery. Reached out to [facility] to confirm if 6 months needed per insurance.   6. Follow up with Dr. Mohan in 1-2 months  7. Follow up with Gladys Clifton in 3 months   8. MTM referral placed to be followed up in 1 month       HISTORY OF PRESENT ILLNESS:  Weight Loss History Reviewed with Patient 11/14/2022   How long have you been overweight? Since early childhood   What is the most that you have ever weighed? 350   What is the most weight you have lost? 40   I have tried the following methods to lose weight Watching portions or calories, Exercise, Atkins type diet (low carb/high protein), Pre packaged meals ex: Nutrisystem, Prescription Medications   I have tried the following weight loss medications? (Check all that apply) Xenical/Orlistat/Imtiaz, Phentermine/Adipex-p/Suprenza, Metformin  "  Have you ever had weight loss surgery? No     Onset of obesity since childhood. Increased weight through college. Gradual weight gain.    Has tried to lose weight previously withmedications, diet and exercise. Has not felt that any of life changes are sustainable or would not see progress. Lost 40lbs after started DMII meds and restrictive diet. Weight gain started to affect health since 2019 - diagnosed with DMII and HTN. Feels that weight is \"wearing\" on body. Feels like she has exhausted all options. Is concerned with how it is now affecting health.     Weight today is 350lbs - highest in life.     Eating 2 meals a day, 1-2 snacks. Will get full, but struggle to stay full. Eating out 2-3x week. She will buy her own food and meals. Lives at home, but mom is working more so eating out more.   Breakfast - skips   Lunch - turkey sandwhich or eating out - sub or chipotle   Dinner - order in, pasta, rice, - largest meal of day   Snacks - savory, chips, crackers, string cheese, popcorn, pretzels.     Activety - has had increased swelling in ankles and feet. Trying to walk more - 2-3xweek.        AOMs:   Phentermine - had some weight loss. No longer on.    Imtiaz - wasn't on long enough or consistent enough   Ozempic - currently has been taking it for 1 month. Has seen some hunger control     CO-MORBIDITIES OF OBESITY INCLUDE:     11/14/2022   I have the following health issues associated with obesity: Type II Diabetes, High Blood Pressure, High Cholesterol, Sleep Apnea   DMII - On ozempic. Not currently checking BS. Getting a nichole next week.     HTN - Currently well controlled with medication. Has struggled with compliance in the past, but not currently.     SARANYA - does not have CPAP. Does not have sleep provider    HA - worse with menstrual cycle. Managed with tylenol and ibuprophen.     History of bleeding or clotting disorder? No  Immunologic or biologic med? No    PAST MEDICAL HISTORY:  Past Medical History: "   Diagnosis Date     Bell palsy April 2014     Cholecystolithiasis      Chronic cough      Diabetes (H)      Gallstones      Hypertension      Nausea and vomiting 3/11/2020     Obese    Bell palsy - residual facial drooping     PAST SURGICAL HISTORY:  Past Surgical History:   Procedure Laterality Date     ENT SURGERY      tonsillectomy     ENT SURGERY      wisdom teeth extraction     LAPAROSCOPIC CHOLECYSTECTOMY N/A 3/18/2020    Procedure: LAPAROSCOPIC CHOLECYSTECTOMY;  Surgeon: Jasper Becker MD;  Location:  OR     [facility] NONSPECIFIC PROCEDURE      Tonsillectomy   Cholysyectomy - 2020     FAMILY HISTORY:   No family history on file.    SOCIAL HISTORY:   Social History Questions Reviewed With Patient 11/14/2022   Which best describes your employment status (select all that apply) I work full-time   Which best describes your marital status: single   Do you have children? No   Who do you have in your support network that can be available to help you for the first 2 weeks after surgery? Mom, sister, brohter   Who can you count on for support throughout your weight loss surgery journey? Mom, sister, brother   Can you afford 3 meals a day?  Yes   Can you afford 50-60 dollars a month for vitamins? Yes    - works 12 hour days. Works from home, mostly desk work.    HABITS:     11/14/2022   How often do you drink alcohol? Never   Have you ever used any of the following nicotine products? No   Have you or are you currently using street drugs or prescription strength medication for which you do not have a prescription for? No   Do you have a history of chemical dependency (alcohol or drug abuse)? No     Currently taking narcotic/opioids No    PSYCHOLOGICAL HISTORY:   Psychological History Reviewed With Patient 11/14/2022   Have you ever attempted suicide? Never.   Have you had thoughts of suicide in the past year? No   Have you ever been hospitalized for mental illness or a suicide attempt? Never.   Do you have a  history of chronic pain? No   Have you ever been diagnosed with fibromyalgia? No   Are you currently seeing a therapist or counselor?  No   Are you currently seeing a psychiatrist? No       ROS:     11/14/2022   Skin:  None of the above   HEENT: Headaches   Musculoskeletal: Swelling of legs   Cardiovascular: Shortness of breath with activity   Pulmonary: Snoring, People have told me I stop breathing while asleep, Experience morning headaches   Gastrointestinal: None of the above   Genitourinary: None of the above   Hematological: None of the above   Neurological: Migraine headaches   Female only: None of the above       EATING BEHAVIORS:     11/14/2022   Have you or anyone else thought that you had an eating disorder? No   Do you currently binge eat (eat a large amount of food in a short time)? Yes   Are you an emotional eater? No   Do you get up to eat after falling asleep? No       EXERCISE:     11/14/2022   How often do you exercise? Less than 1 time per week   What is the duration of your exercise (in minutes)? 30 Minutes   What types of exercise do you do? walking, climbing stairs at work   What keeps you from being more active?  I should be more active but I just have not gotten around to it, Shortness of breath, Too tired       MEDICATIONS:  Current Outpatient Medications   Medication Sig Dispense Refill     acetaminophen (TYLENOL) 325 MG tablet Take 325-650 mg by mouth every 6 hours as needed for headaches       alcohol swab prep pads Use to swab area of injection/hola as directed. 100 each 3     amLODIPine (NORVASC) 10 MG tablet Take 10 mg by mouth At Bedtime        blood glucose (NO BRAND SPECIFIED) test strip Use to test blood sugar 4 times daily or as directed. To accompany: Blood Glucose Monitor Brands: per insurance. 100 strip 6     blood glucose calibration (NO BRAND SPECIFIED) solution To accompany: Blood Glucose Monitor Brands: per insurance. 1 Bottle 3     blood glucose monitoring (NO BRAND  SPECIFIED) meter device kit Use to test blood sugar 4 times daily or as directed. Preferred blood glucose meter OR supplies to accompany: Blood Glucose Monitor Brands: per insurance. 1 kit 0     ibuprofen (ADVIL/MOTRIN) 600 MG tablet Take 1 tablet (600 mg) by mouth every 6 hours as needed for moderate pain 45 tablet 1     lisinopril (PRINIVIL/ZESTRIL) 40 MG tablet Take 1 tablet (40 mg) by mouth daily 30 tablet 0     LORazepam (ATIVAN) 0.5 MG tablet Take 0.5 mg by mouth every 6 hours as needed for anxiety       OZEMPIC, 0.25 OR 0.5 MG/DOSE, 2 MG/1.5ML SOPN pen Inject 0.5 mg Subcutaneous every 7 days       potassium chloride ER (KLOR-CON M) 10 MEQ CR tablet Take 10 mEq by mouth 2 times daily       spironolactone (ALDACTONE) 25 MG tablet Take 25 mg by mouth       thin (NO BRAND SPECIFIED) lancets Use with lanceting device. To accompany: Blood Glucose Monitor Brands: per insurance. 100 each 6       ALLERGIES:  Allergies   Allergen Reactions     No Known Allergies              Anti-obesity medication ROS:    HEENT  Hx of glaucoma: No    Cardiovascular  CAD:No  HTN:Yes    Gastrointestinal  GERD:No  Constipation:No  Liver Dz:No  H/O Pancreatitis:No    Psychiatric  Bipolar: No  Anxiety:No  Depression:No  History of alcohol/drug abuse: No  Hx of eating disorder:No    Endocrine  Personal or family hx of MTC or MEN2:No  Diabetes/prediabetes: Yes    Neurologic:  Hx of seizures: Yes when 5 years old due to fever  Hx of migraines: Yes  Memory Impairment: No      History of kidney stones: No  Kidney disease: No  Current birth control: No not currently sexually active         Objective    Vitals - Patient Reported  Pain Score: No Pain (0)      Vitals:  No vitals were obtained today due to virtual visit.    Physical Exam     GENERAL: Healthy, alert and no distress  EYES: Eyes grossly normal to inspection.  No discharge or erythema, or obvious scleral/conjunctival abnormalities.  RESP: No audible wheeze, cough, or visible cyanosis.   No visible retractions or increased work of breathing.    SKIN: Visible skin clear. No significant rash, abnormal pigmentation or lesions.  NEURO: Cranial nerves grossly intact.  Mentation and speech appropriate for age.  PSYCH: Mentation appears normal, affect normal/bright, judgement and insight intact, normal speech and appearance well-groomed.      In summary, Mónica Aly has Class III obesity with a body mass index of Body mass index is 53.22 kg/m . kg/m2 and the comorbidities stated above. She completed an informational seminar and is a possible candidate for the laparoscopic gastric sleeve.  She will have to complete the following pre-requisites:    Received weight loss goal of 35 lb prior to surgery.  Achieve clearance from dietitian to see surgeon.  Have preoperative laboratory tests drawn.  Psychological Evaluation with MMPI and clearance for weight loss surgery.  Letter of clearance from the following PCP, sleep, endocrine    Today in the office we discussed gastric sleeve surgery. Preoperative, perioperative, and postoperative processes, management, and follow up were addressed.  Risks and benefits were outlined including the risk of death, staple line leak (1-2%), PE, DVT, ulcer, worsening GERD, N/V, stricture, hernia, wound infection, weight regain, and vitamin deficiencies. I emphasized exercise and activity along with appropriate food choice as the main foundation for weight loss with surgery providing surgical reinforcement of this.  All questions were answered.  A goal sheet and support group handout were given to the patient.        If you have not already watched our online seminar please go to [email or web address]    Weight loss requirement: 35lbs prior to surgery. Will have final weight check 2-3 weeks prior to surgery at anesthesia or nurse pre-op teaching visit.    -Need current weight confirmation at primary clinic or weight management clinic Yes    Bariatric labs ordered,  call for a lab only appointment at any [facility] lab. To find a lab location near you, please call [phone]. Please let us know if orders need to be faxed to a non [facility] lab.    Schedule bariatric psych eval as soon as possible.  List of psychologists will be sent to you via [employer] or given to you in clinic.     Call Manolo Medina at [phone] to discuss insurance coverage for bariatric surgery.  Please check with your insurance regarding bariatric surgery coverage also. Manolo can also help you with scheduling psych eval if you are having difficulties.    The following clearance letters are needed: Letter templates will be sent to you via [employer] or given to you in clinic. Providers can submit through electronic medical record or fax to [phone].  - Letter of support from primary care provider.   - Endocrine   - Sleep     Smoking cessation and nicotine test needed: No    Birth control after surgery discussed. Patient instructed that 2 forms of birth control required after surgery and to avoid pregnancy for at least 18 months after surgery: Not currently on birth control and not currently sexually active, will reevaluate if this changes.     NEXT VISITS: A  should reach out to you to schedule the following appointments.  If they do not reach you please call [phone] to schedule the following appointments:    -See dietitian in 1 month and monthly for 3 months    -See Lauren Bloch [facility] pharmacist in 1 month to follow up on weight loss medications    -See Gladys Clifton in 3 months to follow up on pre-op weight loss and weight loss medications    -See Dr Mohan in 1 month for bariatric surgeon visit. Discuss bariatric surgery.       Once the patient has completed the requirements in their task list and there are no further recommendations, the pt will be allowed to see the surgeon of their choice for consultation on the laparoscopic gastric sleeve surgery. Patient verbalizes  understanding of the process to surgery and expectations for the postoperative period including the need for lifelong lifestyle changes, vitamin supplementation, and laboratory monitoring.    Sincerely,     KENNEY FRAGA PA-C

## 2022-11-15 NOTE — NURSING NOTE
"(   Chief Complaint   Patient presents with     Consult    )    ( Weight: (!) 350 lb (pt reported) )  ( Height: 5' 8\" )  ( BMI (Calculated): 53.22 )  (   )  (   )  (   )  (   )  (   )  (   )    (   )  (   )  (   )  (   )  (   )  (   )  (   )    (   Patient Active Problem List   Diagnosis     Acute suppurative otitis media without spontaneous rupture of ear drum     Allergic rhinitis     Bell palsy     Diabetic keto-acidosis (H)     Nausea and vomiting     Cholelithiasis     Diabetes type 2, controlled (H)     Hypertension     Obstructive sleep apnea    )  (   Current Outpatient Medications   Medication Sig Dispense Refill     acetaminophen (TYLENOL) 325 MG tablet Take 325-650 mg by mouth every 6 hours as needed for headaches       alcohol swab prep pads Use to swab area of injection/hola as directed. 100 each 3     amLODIPine (NORVASC) 10 MG tablet Take 10 mg by mouth At Bedtime        blood glucose (NO BRAND SPECIFIED) test strip Use to test blood sugar 4 times daily or as directed. To accompany: Blood Glucose Monitor Brands: per insurance. 100 strip 6     blood glucose calibration (NO BRAND SPECIFIED) solution To accompany: Blood Glucose Monitor Brands: per insurance. 1 Bottle 3     blood glucose monitoring (NO BRAND SPECIFIED) meter device kit Use to test blood sugar 4 times daily or as directed. Preferred blood glucose meter OR supplies to accompany: Blood Glucose Monitor Brands: per insurance. 1 kit 0     clindamycin (CLEOCIN T) 1 % external lotion Apply topically 2 times daily       dapagliflozin (FARXIGA) 5 MG TABS tablet Take 5 mg by mouth daily       exenatide ER (BYDUREON) 2 MG pen Inject 2 mg Subcutaneous every 7 days       ibuprofen (ADVIL/MOTRIN) 600 MG tablet Take 1 tablet (600 mg) by mouth every 6 hours as needed for moderate pain 45 tablet 1     insulin aspart (NOVOLOG PEN) 100 UNIT/ML pen Please use 1 unit of Novolog for every 10 grams of carbohydrate , Also cover your blood sugars as per sliding " scale. 15 mL 1     insulin glargine (LANTUS VIAL) 100 UNIT/ML vial Inject 40 Units Subcutaneous At Bedtime Has been weaning her dose back as instructed by her Dr.        insulin pen needle (31G X 8 MM) 31G X 8 MM miscellaneous 1 Box of 100 insulin pen needles to be dispensed with every insulin pen prescription 100 each 0     lisinopril (PRINIVIL/ZESTRIL) 40 MG tablet Take 1 tablet (40 mg) by mouth daily 30 tablet 0     LORazepam (ATIVAN) 0.5 MG tablet Take 0.5 mg by mouth every 6 hours as needed for anxiety       metFORMIN (GLUCOPHAGE) 500 MG tablet Take 500 mg by mouth 2 times daily (with meals)       ondansetron (ZOFRAN-ODT) 4 MG ODT tab Take 1 tablet (4 mg) by mouth every 6 hours as needed for nausea or vomiting 15 tablet 0     oxyCODONE (ROXICODONE) 5 MG tablet Take 1 tablet (5 mg) by mouth every 6 hours as needed for pain 30 tablet 0     oxyCODONE (ROXICODONE) 5 MG tablet Take 1-2 tablets (5-10 mg) by mouth every 4 hours as needed for moderate to severe pain 10 tablet 0     potassium chloride ER (KLOR-CON M) 10 MEQ CR tablet Take 10 mEq by mouth 2 times daily       SENNA-docusate sodium (SENNA S) 8.6-50 MG tablet Take 1-2 tablets by mouth 2 times daily as needed (constipation) 20 tablet 0     thin (NO BRAND SPECIFIED) lancets Use with lanceting device. To accompany: Blood Glucose Monitor Brands: per insurance. 100 each 6     vitamin D2 (ERGOCALCIFEROL) 06132 units (1250 mcg) capsule Take 50,000 Units by mouth once a week On Thursdays       furosemide (LASIX) 10 MG/ML solution Take 2 mLs (20 mg) by mouth daily for 20 days 40 mL 0    )  ( Diabetes Eval:    )    ( Pain Eval:  No Pain (0) )    ( Wound Eval:       )    (   History   Smoking Status     Never   Smokeless Tobacco     Never    )    ( Signed By:  Salinas Krueger, EMT; November 15, 2022; 9:19 AM )

## 2022-11-17 ENCOUNTER — CARE COORDINATION (OUTPATIENT)
Dept: ENDOCRINOLOGY | Facility: CLINIC | Age: 27
End: 2022-11-17

## 2022-11-17 ENCOUNTER — VIRTUAL VISIT (OUTPATIENT)
Dept: ENDOCRINOLOGY | Facility: CLINIC | Age: 27
End: 2022-11-17
Payer: COMMERCIAL

## 2022-11-17 ENCOUNTER — TELEPHONE (OUTPATIENT)
Dept: ENDOCRINOLOGY | Facility: CLINIC | Age: 27
End: 2022-11-17

## 2022-11-17 DIAGNOSIS — E66.9 OBESITY: ICD-10-CM

## 2022-11-17 DIAGNOSIS — Z71.3 NUTRITIONAL COUNSELING: Primary | ICD-10-CM

## 2022-11-17 PROCEDURE — 97802 MEDICAL NUTRITION INDIV IN: CPT | Mod: 95 | Performed by: DIETITIAN, REGISTERED

## 2022-11-17 NOTE — PATIENT INSTRUCTIONS
Heriberto Sales,    Follow-up with RD on 12/23    Thank you,    Yenni Sandhu, RD, LD  If you would like to schedule or reschedule an appointment with the RD, please call [phone]    Nutrition Goals  Relating To Eating:  Breakfast: protein shake  Lunch: open faced sandwich  Snack: protein snack (sting cheese, hard boiled egg, 1/4 cup nuts)  Dinner: Plate Method  Eat slowly (20-30 minutes per meal), chewing foods well (25 chews per bite/applesauce consistency)    Relating to beverages:  Avoid drinking with meals and for 30 mins after  Drink 64+ ounces of fluid per day  Eliminate carbonation   Reduce caffeine prior to surgery     Relating to dietary supplements:  Start a multivitamin containing iron daily    Relating to activity:  Increase activity as able. Monitor steps/activity on Apple watch. Consider increasing baseline steps by 1000 steps/day weekly    Meal Replacement Shake Options:   *Protein Shake Criteria: no more than 210 Calories, at least 20 grams of protein, and less than 10 grams of sugar   [facility] smoothie (160 Calories, 20 g protein)   Premier Protein (160 Calories, 30 g protein)  Slim Fast Advanced Nutrition (180 Calories, 20 g protein)  Muscle Milk, lactose-free, 17 oz bottle (210 Calories, 30 g protein)  Integrated Supplements, no artificial sugars (110 Calories, 20 g protein)  Genepro, unflavored protein powder (60 Calories, 30 g protein)  Boost/Ensure Max (160 calories, 30 gm protein)   [facility] Core Power (170 calories, 26 gm protein)  Aldi's Elevation Protein Powder (180 calories, 30 gm protein)     Meal Replacement Bar Options:  [facility] Protein Shake (160 Calories, 15 g protein)  Quest Protein Bars (190 Calories, 20 g protein)  Built Bar (170 Calories, 15-20 g protein)  One Protein Bar (210 calories, 20 g protein)  [place]so Signature Protein Bar (Costco) (190 Calories, 21 g protein)  Pure Protein Bars (180 Calories, 21 g protein)    Low Calorie Frozen Meal:  Healthy Choice Power  "Bowls  Lean Cuisine  Smart Ones  Lyle Gomez    The Plate Method:  Http://www.[email or web address]    https://www.cdc.gov/diabetes/images/managing/Diabetes-Manage-Eat-Well-Plate-Graphic_600px.jpg    Protein Sources for Weight Loss  [email or web address]     Carbohydrates  [email or web address]     Mindful Eating  [email or web address]     Summary of Volumetrics Eating Plan  [email or web address]     Diet Guidelines after Weight Loss Surgery  [email or web address]     Seated Exercises for Arms and Legs (can be done before or after surgery)  [email or web address]    Tools for after surgery:  YesWeAd Dish Sets: bariatric meal size bowls and plates with built in measurement designs on the dishes    Bariatric Pal: https://store.deviantART.COLOURlovers/collections/bariatric-dinnerware    Books:  \"Fresh Start Bariatric Cookbook\" by Lisa Miramontes, MS, RDN, CD - Excellent cookbook with post-op recipes and many other resources to check out for ongoing support after surgery    \"Eating Well After Weight Loss Surgery\" by Ramonita Gomez and Dez Banda - Great cookbook with recipes for each diet stage after surgery and recommended portion sizes. Slightly more intensive cooking recipes.    \"Bariatric Mindset Success\" by Isaura Sunshine - book that helps with prevention of weight regain after surgery. Helps train your brain for long term success with weight loss after surgery.    Post-Bariatric Surgery Online Recipe Resources:  Online:  Simpler Recipes: https://www.Indiegogo.COLOURlovers/bariatric-surgery/recipes  https://www.Stunn.com/bariatric-recipes/  https://bariatricmealFosbury.com/bariatric-recipes/   Some recipes on this site have larger than 1 cup portion size pictures: http://www.[facility].org/weight-loss-surgery/nutrition/recipe-corner.html   https://www.foodcoach.me/25-bariatric-friendly-weeknight-meals/  Crockpot recipes: " "https://www.foodcoach.me/25-bariatric-friendly-crockpot-recipes/  https://Acrolinx.Function Space/recipes/   https://www.Privileged World Travel Club.Function Space/mbd-recipes  The World According to Eggface Blog: https://thejonathanvalentinaace.Ncube World.com/        COMPREHENSIVE WEIGHT MANAGEMENT PROGRAM  VIRTUAL SUPPORT GROUPS    For Support Group Information:      We offer support groups for patients who are working on weight loss and considering, preparing for or have had weight loss surgery.   There is no cost for this opportunity.  You are invited to attend the?Virtual Support Groups?provided by any of the following locations:    [facility] via Microsoft Teams with Marlys Tay RN  2.   [place] via meebee with Da Gregg, PhD, LP  3.   [place] via meebee with Cristina Horta RN  4.   [facility] via Microsoft Teams with Cristina Palacios [facility]    The following Support Group information can also be found on our website:  https://www.[facility].org/treatments/weight-loss-surgery-support-groups      [facility] Weight Loss Surgery Support Group    [facility] Weight Loss Surgery Support Group  The support group is a patient-lead forum that meets monthly to share experiences, encouragement and education. It is open to those who have had weight loss surgery, are scheduled for surgery, and those who are considering surgery.   WHEN: This group meets on the 3rd Wednesday of each month from 5:00PM - 6:00PM virtually using Microsoft Teams.   FACILITATOR: Led by Marlys Tay, RD, LD, RN, the program's Clinical Coordinator.   TO REGISTER: Please contact the clinic via [employer] or call the nurse line directly at [phone] to inform our staff that you would like an invite sent to you and to let us know the email you would like the invite sent to. Prior to the meeting, a link with directions on how to join the meeting will be sent to you.    2022 Meetings  January 19: \"Let's Talk\" " "a time for the group to share.  February 16: \"Let's Talk\" a time for the group to share.  March 16: Guest Speakers: Psychologists, Key Reardon, PhD,LP and Caitie Mccoy PsyD,  April 20: Guest Speaker: Health , Jackie Panda, CH,CHES, CPT  May 18: Guest Speaker: Dietitian, Joni Prasad, ELOISE, LP  Patience 15: \"Let's Talk\" a time for the group to share.  July 20: \"Let's Talk\" a time for the group to share.  August 17: TBA  September 21: TBA  October 19: Guest Speaker: Dr Kulwinder Perez MD Pulmonologist and Sleep Medicine Physician, \"Getting a Good Night's Sleep\".  November 16: TBA  December 21: TBA    [facility] Support Groups    Connections: Bariatric Care Support Group?  This is open to all [facility] (and those external to this program) pre- and post- operative bariatric surgery patients as well as their support system.   WHEN: This group meets the 2nd Tuesday of each month from 6:30 PM - 8:00 PM virtually using Microsoft Teams.   FACILITATOR: Led by Da Gregg, Ph.D who is a Licensed Psychologist with the [facility] Comprehensive Weight Management Program.   TO REGISTER: Please send an email to Da Gregg, Ph.D.,  at?[email or web address]?if you would like an invitation to the group and to learn about using Microsoft Teams.    2022 Meetings  January 11: Anjali Fletcher, PharmD, Pharmacy Resident at [facility], \"Medications and Bariatric Surgery\".  February 8: Open Forum  March 8  April 12  May 10  Patience 14    Connections: Post-Operative Bariatric Surgery Support Group  This is a support group for [facility] bariatric patients (and those external to [facility]) who have had bariatric surgery and are at least 3 months post-surgery.  WHEN: This support group meets the 4th Wednesday of the month from 11:00 AM - 12:00 PM virtually using Microsoft Teams.   FACILITATOR: Led by Certified Bariatric Nurse, Cristina Horta RN.   TO REGISTER: " Please send an email to Cristina at [email or web address] if you would like an invitation to the group and to learn about using Microsoft Teams.    2022 Meetings  January 26  February 23  March 23  April 27  May 25  Patience 22    [facility] Healthy Lifestyle Virtual Support Group    Healthy Lifestyle Virtual Support Group?  This is 60 minutes of small group guided discussion, support and resources. All are welcome who want a healthy lifestyle.  WHEN: This group meets monthly on a Friday from 12:30 PM - 1:30 PM virtually using Microsoft Teams.   FACILITATOR: Led by National Board Certified Health and , Cristina Palacios [facility].   TO REGISTER: Please send an email to Cristina at?[email or web address] to receive monthly invites to the group or if you have any questions about having a health .  Prior to the meeting, a link with directions on how to join the meeting will be sent to you.    2022 Meetings  MAY 20: OPEN FORUM  JUNE: 24:  Setting Limits and BoundariesCristina  July 29: OPEN FORUM  August 26: Special Guest Registered Dietician Mari Bean  Sept 30: OPEN FORUM  Oct 28, GraEmani Buckley National Board Certified Health   Nov 18: Navigating How to Eat Around the Holidays with ELOISE Paredes  Dec 16: Changing your relationship with movement with  Jackie National Board Certified Health

## 2022-11-17 NOTE — TELEPHONE ENCOUNTER
Attempted to call patient about scheduling some follow up appts. No answer, voice mail box full. Sent [employer].      Schedule     Dr. Mohan meet and greet 1-2 months Follow up with Gladys Clifton in 3 months RD follow up in 1 month     NEW CONSULT WLS CLASS. Offered the 1st and 3rd Tuesdays at 10:00am, check-in at 9:45am with Toyin Dykes RN

## 2022-11-17 NOTE — PROGRESS NOTES
"Mónica Aly is a 27 year old female who is being evaluated via a billable video visit.      The patient has been notified of following:     \"This video visit will be conducted via a call between you and your physician/provider. We have found that certain health care needs can be provided without the need for an in-person physical exam.  This service lets us provide the care you need with a video conversation.  If a prescription is necessary we can send it directly to your pharmacy.  If lab work is needed we can place an order for that and you can then stop by our lab to have the test done at a later time.    Video visits are billed at different rates depending on your insurance coverage.  Please reach out to your insurance provider with any questions.    If during the course of the call the physician/provider feels a video visit is not appropriate, you will not be charged for this service.\"    Patient has given verbal consent for Video visit? Yes  How would you like to obtain your AVS? MyChart  If you are dropped from the video visit, the video invite should be resent to: Text to cell phone: [phone]  Will anyone else be joining your video visit? No  {If patient encounters technical issues they should call [phone]      Video-Visit Details    Type of service:  Video Visit    Video Start Time: 7:10 AM  Video End Time: 7:54 AM    Originating Location (pt. Location): Home    Distant Location (provider location):  Offsite (providers home) [facility] [place]     Platform used for Video Visit: M9 Defense    During this virtual visit the patient is located in [place], patient verifies this as the location during the entirety of this visit.     New Bariatric Nutrition Consultation Note    Reason For Visit: Nutrition Assessment    Mónica Aly is a 27 year old presenting today for new bariatric nutrition consult.   Pt is interested in laparoscopic sleeve gastrectomy with Dr. Mohan " "expected surgery in TBD.  Patient is accompanied by self.  This is pt's first of 3 vs 6 required nutrition visits prior to surgery.     Pt referred by JANICE Baker on November 17, 2022.  CO-MORBIDITIES OF OBESITY INCLUDE:       11/14/2022   I have the following health issues associated with obesity: Type II Diabetes, High Blood Pressure, High Cholesterol, Sleep Apnea      DMII - Currently well controlled on Ozempic 0.5mg. Getting CGM next week to start checking blood sugar daily. Followed by endocrine. Last A1C 6.8 on 8/2022     S/p cholecystectomy in 2020    SUPPORT:  Support System Reviewed With Patient 11/14/2022   Who do you have in your support network that can be available to help you for the first 2 weeks after surgery? Mom, sister, brohter   Who can you count on for support throughout your weight loss surgery journey? Mom, sister, brother       ANTHROPOMETRICS:  Estimated body mass index is 53.22 kg/m  as calculated from the following:    Height as of 11/15/22: 1.727 m (5' 8\").    Weight as of 11/15/22: 158.8 kg (350 lb).    Required weight loss goal pre-op: 35 lbs from initial consult weight (goal weight 315 lbs or less before surgery)       11/14/2022   I have tried the following methods to lose weight Watching portions or calories, Exercise, Atkins type diet (low carb/high protein), Pre packaged meals ex: Nutrisystem, Prescription Medications       Weight Loss Questions Reviewed With Patient 11/14/2022   How long have you been overweight? Since early childhood       SUPPLEMENT INFORMATION:  None     NUTRITION HISTORY:  NKFA/intolerances.  Pt is Judaism and does not eat pork. Avoids seafood d/t preference.   Weight loss experiences: Fad dieting - Keto, low calorie - worked at first but not sustainable d/t extreme nature of changes for her.    Barriers to lifestyle change: All or nothing mentalitty  Typically not eating breakfast b/c not hungry. First meal around 11 am. Snack before dinner. Gets off around " 4 pm then running errands. Dinner after 8 pm. Feeling pretty hungry at this point and larger portions. Sometimes snack after dinner.   Beverages: coffee 3 times/week. Drinking water with meals (32-48 oz/day). Occ sparkling water.  Alcohol: none   Dining-out/take-out: Most often at dinner, and more recently - [employer], etc.   Food preferences: Italian/pasta; sandwiches. Like spinach, peppers, onions, corn, salads,  Pineapple, manago, peaches, strawberries, grapes.     Recall Diet Questions Reviewed With Patient 11/14/2022   Describe what you typically consume for breakfast (typical or most recent): None, maybe iced coffee; K-cup with sweet creamer; or Pesotum latte   Describe what you typically consume for lunch (typical or most recent): 11 am - turkey sandwich with popcorn/chips; take-out sandwich     PM snack: chips/crackers/popcorn    Describe what you typically consume for supper (typical or most recent): 8 pm pr alter - rice, pasta, other carb filled meal   Describe what you typically consume as snacks (typical or most recent): Popcorn, crackers, string cheese, chips   How many ounces of water, or other low calorie drinks, do you drink daily (8 oz=1 glass)? 32 oz   How many ounces of caffeine (coffee, tea, pop) do you drink daily (8 oz=1 glass)? 8 oz   How many ounces of carbonated (pop, beer, sparkling water) drinks do you drinky daily (8 oz=1 glass)? 0 oz   How many ounces of juice, pop, sweet tea, sports drinks, protein drinks, other sweetened drinks, do you drink daily (8 oz=1 glass)? 0 oz   How many ounces of milk do you drink daily (8 oz=1 glass) 0 oz   How often do you drink alcohol? Never       Eating Habits 11/14/2022   Do you have any dietary restrictions? No   Do you currently binge eat (eat a large amount of food in a short time)? Yes   Are you an emotional eater? No   Do you get up to eat after falling asleep? No   What foods do you crave? savory foods       Dining Out History Reviewed With Patient  11/14/2022   How often do you dine out? Around once a week.   Where do you dine out? (select all that apply) sit-down restaurants, fast food chains, take out   What types of food do you order when you dine out? rice, pasta, sandwiches, pizza         EXERCISE:  Minimal exercise, and sedentary job. Does try to get up and walk around as much as possible recently as she has noticed lower leg swelling. She has an Apple watch she can monitor activity on.     11/14/2022   How often do you exercise? Less than 1 time per week   What is the duration of your exercise (in minutes)? 30 Minutes   What types of exercise do you do? walking, climbing stairs at work   What keeps you from being more active?  I should be more active but I just have not gotten around to it, Shortness of breath, Too tired       ADDITIONAL INFORMATION:  Works as an , M-F long hours.       NUTRITION DIAGNOSIS:  Obesity r/t long history of positive energy balance aeb BMI >30 kg/m2.    INTERVENTION:  Intervention Provided/Education Provided on post-op diet guidelines, vitamins/minerals essential post-operatively, GI anatomy of bariatric surgeries, ways to help prepare for post-op diet guidelines pre-operatively, portion/calorie-control, mindful eating and sources of protein.  Patient demonstrates understanding.     Personal barriers to making and continuing required life changes have been identified, and strategies to overcome those barriers have been recommended AND family and social supports have been assessed and strategies to strengthen those supports have been recommended.    Provided pt with list of goals, RD contact information and resources listed below via S[employer]et.         Questions Reviewed With Patient 11/14/2022   How ready are you to make changes regarding your weight? Number 1 = Not ready at all to make changes up to 10 = very ready. 10   How confident are you that you can change? 1 = Not confident that you will be successful making  changes up to 10 = very confident. 10       Expected Engagement: good    GOALS:  Relating To Eating:    Breakfast: protein shake    Lunch: open faced sandwich    Snack: protein snack (sting cheese, hard boiled egg, 1/4 cup nuts)    Dinner: Plate Method    Eat slowly (20-30 minutes per meal), chewing foods well (25 chews per bite/applesauce consistency)    Relating to beverages:    Avoid drinking with meals and for 30 mins after    Drink 64+ ounces of fluid per day    Eliminate carbonation     Reduce caffeine prior to surgery     Relating to dietary supplements:    Start a multivitamin containing iron daily    Relating to activity:    Increase activity as able. Monitor steps/activity on Apple watch. Consider increasing baseline steps by 1000 steps/day weekly    Meal Replacement Shake Options:   *Protein Shake Criteria: no more than 210 Calories, at least 20 grams of protein, and less than 10 grams of sugar   [facility] smoothie (160 Calories, 20 g protein)   Premier Protein (160 Calories, 30 g protein)  Slim Fast Advanced Nutrition (180 Calories, 20 g protein)  Muscle Milk, lactose-free, 17 oz bottle (210 Calories, 30 g protein)  Integrated Supplements, no artificial sugars (110 Calories, 20 g protein)  Genepro, unflavored protein powder (60 Calories, 30 g protein)  Boost/Ensure Max (160 calories, 30 gm protein)   NetProspex Core Power (170 calories, 26 gm protein)  Aldi's Elevation Protein Powder (180 calories, 30 gm protein)     Meal Replacement Bar Options:  [facility] Protein Shake (160 Calories, 15 g protein)  Quest Protein Bars (190 Calories, 20 g protein)  Built Bar (170 Calories, 15-20 g protein)  One Protein Bar (210 calories, 20 g protein)  [facility] Signature Protein Bar ([employer]co) (190 Calories, 21 g protein)  Pure Protein Bars (180 Calories, 21 g protein)    Low Calorie Frozen Meal:  Healthy Choice Power Bowls  Lean Cuisine  Smart Ones  Lyle Castillo Delights    The Plate  "Method:  Http://www.[email or web address]    https://www.cdc.gov/diabetes/images/managing/Diabetes-Manage-Eat-Well-Plate-Graphic_600px.jpg    Protein Sources for Weight Loss  [email or web address]     Carbohydrates  [email or web address]     Mindful Eating  [email or web address]     Summary of Volumetrics Eating Plan  [email or web address]     Diet Guidelines after Weight Loss Surgery  [email or web address]     Seated Exercises for Arms and Legs (can be done before or after surgery)  [email or web address]://[email or web address]    Tools for after surgery:  [place] Dish Sets: bariatric meal size bowls and plates with built in measurement designs on the dishes    Bariatric Pal: https://store.Sanwu Internet Technology.Pull/collections/bariatric-dinnerware    Books:  \"Fresh Start Bariatric Cookbook\" by Lisa Miramontes, MS, RDN, CD - Excellent cookbook with post-op recipes and many other resources to check out for ongoing support after surgery    \"Eating Well After Weight Loss Surgery\" by Ramonita Gomez and Dez Banda - Great cookbook with recipes for each diet stage after surgery and recommended portion sizes. Slightly more intensive cooking recipes.    \"Bariatric Mindset Success\" by Isaura Sunshine - book that helps with prevention of weight regain after surgery. Helps train your brain for long term success with weight loss after surgery.    Post-Bariatric Surgery Online Recipe Resources:  Online:    Simpler Recipes: https://www.Connect Financial Software Solutions/bariatric-surgery/recipes    https://www.The Dolan Company.com/bariatric-recipes/    https://bariatricHome Inventory S[pecialists.com/bariatric-recipes/     Some recipes on this site have larger than 1 cup portion size pictures: http://www.[facility].org/weight-loss-surgery/nutrition/recipe-corner.html     https://www.foodSensum.me/25-bariatric-friendly-weeknight-meals/    Crockpot recipes: " https://www.foodcoach.me/25-bariatric-friendly-crockpot-recipes/    https://Akira Technologies.PayBox Payment Solutions/recipes/     https://www.SphynKx Therapeutics.PayBox Payment Solutions/mbd-recipes    The World According to EggConcard Blog: https://theworldaccordingtoeggface.Lightside Games.com/          Time spent with patient: 44 minutes.  Yenni Sandhu RD, LD

## 2022-11-17 NOTE — PROGRESS NOTES
Bariatric Task List updated.  Bariatric information/clearance letters sent to patient via [employer].    Bariatric Task List    Fax:  Please fax all paperwork to: [phone] -     Status:  Is patient a candidate for bariatric surgery?:  patient is a candidate for bariatric surgery -     Cleared to schedule surgeon consult?:  cleared to schedule surgeon consult -     Status:  surgery evaluation in process -     Surgeon: Dr. Mohan -     Tentative surgery month/year: TBD -        Insurance: Insurance:  BCBS[place]BS [place] -      Contact insurance to discuss coverage: Needed -       Cigna: PCP Recommendation and Medical Clearance:    -     HP Referral:    -      Advanced beneficiary notification (ABN) for Medicare patients for RD visits   and surgery:   -      Weight history:   -     Other:    -        Patient Info: Initial Weight:  350 -     Date of Initial Weight/Height:  11/15/2022 -     Goal Weight (lbs):  315 -     Required Weight Loss:  35 -     Surgery Type:  sleeve gastrectomy -     Multidisciplinary Meeting:    -        Dietician Visits: Structured weight loss required by insurance?:    -     Dietician Visit 1:  Needed - 11/17/22, Yenni Patel [place]   Dietician Visit 2:  Needed - 12/23/22, Yenni Patel [place]   Dietician Visit 3:  Needed -     Dietician Visit 4:  Needed - Unsure?   Dietician Visit 5:  Needed - Unsure?   Dietician Visit 6:  Needed - Unsure?   Dietician Visit additional:    - Monthly until surgery - OK   Clearance from dietician to see surgeon?:    -     Dietician Notes:    -        Psychological Evaluation: Psych eval:  Needed - List and letter sent to patient, 11/17/22, [place]   Therapist letter of support:    -     Psychiatrist letter of support:    -     Establish care with therapist:    -     Complete eating disorder evaluation:    -     Letter of clearance from therapist/eating disorder program:    -     Other:    -        Lab Work: Complete Blood Count:  Needed -   "   Comprehensive Metabolic Panel:  Needed -     Vitamin D:  Needed -     PTH:  Needed -     Hgb A1c:  Needed -      Lipids: Needed -      TSH ([facility], [facility], [place]):   -       Ferritin:   -       Folate:   -       Testosterone, Total and Free:   -     Thiamine:   -     Vitamin A:   -     Vitamin B12:   -     Zinc:   -     C-peptide:   -     H. pylori:    -     MRSA (2 swabs, minimum 48 hours apart):   -     Nicotine Testing:    -     Recheck Vitamin D:   -     Other:    -        Consults/ Clearance Sleep Medicine:  Needed - Letter sent to patient, 11/17/22, [place]   Cardiac:    -     Pain:   -     Dental:    -     Endocrine:  Needed - Letter sent to patient, 11/17/22, [place]   Gastroenterology:    -     Vascular Medicine:    -     Hematology:    -     Medical Weight Management:   -     Physical Therapy/Exercise:    -     Nephrology:    -     Neurology:    -     Pulmonology:    -     Rheumatology:    -     Other:    -     Other:    -     Other:    -        Testing: UGI:    -     EGD:    -     Sleep Study:   -     Other:   -     Other:    -        PCP: Establish care with PCP:  Completed -     Follow up with PCP:    -     PCP letter of support:  Needed - Letter sent to patient, 11/17/22, [place]      Stopping Smoking/ Alcohol Use: Quit tobacco use (3 months smoke free)?:    -     Quit date:    -     Quit alcohol use:   -     Quit date:   -     Other:   -     Quit date:   -        Patient Education:  Information Session:  Needed -     Given \"Making your decision\" handout?:  Yes -     Given \"A Roadmap to you Weight Loss Surgery\" handout?: Yes -     Given \"Get Well Loop\" information?: Yes -     Given support group information?:    -     Attended support group?:  Needed -     Support plan in place?:    -     Research consents signed?:    -     Avoid NSAIDS/ Alternate Plan for Pain:   -        Additional Surgery Requirements: Review Coag plan:    -     HgA1c <8:    -     Inpatient pain consult:    -     Final nicotine screen:    -   "   Dental work complete:    -     Birth control plan:    -     Gallstone prevention plan (Actigall for 6 months postop):   -     Other:   -     Other:   -        Final Tasks:  Before surgery online class:  Needed -     Before surgery online class website link:  [email or web address]   After surgery online class:  Needed -     After surgery online class website link:  [email or web address]   Nurse visit per clinic:  Needed -     History and Physical per clinic:   -     Final labs per clinic: Needed -     Chest xray per clinic:   -     Electrocardiogram (ECG) per clinic:   -     Other:   -        Notes:   -

## 2022-11-17 NOTE — LETTER
"11/17/2022       RE: Mónica Aly  [address]  [place] [address]     Dear Colleague,    Thank you for referring your patient, Mónica Aly, to the [facility] [place] at [facility]. Please see a copy of my visit note below.    Mónica Aly is a 27 year old female who is being evaluated via a billable video visit.      The patient has been notified of following:     \"This video visit will be conducted via a call between you and your physician/provider. We have found that certain health care needs can be provided without the need for an in-person physical exam.  This service lets us provide the care you need with a video conversation.  If a prescription is necessary we can send it directly to your pharmacy.  If lab work is needed we can place an order for that and you can then stop by our lab to have the test done at a later time.    Video visits are billed at different rates depending on your insurance coverage.  Please reach out to your insurance provider with any questions.    If during the course of the call the physician/provider feels a video visit is not appropriate, you will not be charged for this service.\"    Patient has given verbal consent for Video visit? Yes  How would you like to obtain your AVS? MyChart  If you are dropped from the video visit, the video invite should be resent to: Text to cell phone: [phone]  Will anyone else be joining your video visit? No  {If patient encounters technical issues they should call [phone]      Video-Visit Details    Type of service:  Video Visit    Video Start Time: 7:10 AM  Video End Time: 7:54 AM    Originating Location (pt. Location): Home    Distant Location (provider location):  Offsite (providers home) [facility]     Platform used for Video Visit: Instaclustr    During this virtual visit the patient is located " "in [place], patient verifies this as the location during the entirety of this visit.     New Bariatric Nutrition Consultation Note    Reason For Visit: Nutrition Assessment    Mónica Aly is a 27 year old presenting today for new bariatric nutrition consult.   Pt is interested in laparoscopic sleeve gastrectomy with Dr. Mohan expected surgery in TBD.  Patient is accompanied by self.  This is pt's first of 3 vs 6 required nutrition visits prior to surgery.     Pt referred by JANICE Baker on November 17, 2022.  CO-MORBIDITIES OF OBESITY INCLUDE:       11/14/2022   I have the following health issues associated with obesity: Type II Diabetes, High Blood Pressure, High Cholesterol, Sleep Apnea      DMII - Currently well controlled on Ozempic 0.5mg. Getting CGM next week to start checking blood sugar daily. Followed by endocrine. Last A1C 6.8 on 8/2022     S/p cholecystectomy in 2020    SUPPORT:  Support System Reviewed With Patient 11/14/2022   Who do you have in your support network that can be available to help you for the first 2 weeks after surgery? Mom, sister, brohter   Who can you count on for support throughout your weight loss surgery journey? Mom, sister, brother       ANTHROPOMETRICS:  Estimated body mass index is 53.22 kg/m  as calculated from the following:    Height as of 11/15/22: 1.727 m (5' 8\").    Weight as of 11/15/22: 158.8 kg (350 lb).    Required weight loss goal pre-op: 35 lbs from initial consult weight (goal weight 315 lbs or less before surgery)       11/14/2022   I have tried the following methods to lose weight Watching portions or calories, Exercise, Atkins type diet (low carb/high protein), Pre packaged meals ex: Nutrisystem, Prescription Medications       Weight Loss Questions Reviewed With Patient 11/14/2022   How long have you been overweight? Since early childhood       SUPPLEMENT INFORMATION:  None     NUTRITION HISTORY:  NKFA/intolerances.  Pt is Christianity and does not eat pork. Avoids " seafood d/t preference.   Weight loss experiences: Fad dieting - Keto, low calorie - worked at first but not sustainable d/t extreme nature of changes for her.    Barriers to lifestyle change: All or nothing mentalitty  Typically not eating breakfast b/c not hungry. First meal around 11 am. Snack before dinner. Gets off around 4 pm then running errands. Dinner after 8 pm. Feeling pretty hungry at this point and larger portions. Sometimes snack after dinner.   Beverages: coffee 3 times/week. Drinking water with meals (32-48 oz/day). Occ sparkling water.  Alcohol: none   Dining-out/take-out: Most often at dinner, and more recently - [employer], etc.   Food preferences: Italian/pasta; sandwiches. Like spinach, peppers, onions, corn, salads,  Pineapple, manago, peaches, strawberries, grapes.     Recall Diet Questions Reviewed With Patient 11/14/2022   Describe what you typically consume for breakfast (typical or most recent): None, maybe iced coffee; K-cup with sweet creamer; or Salineno latte   Describe what you typically consume for lunch (typical or most recent): 11 am - turkey sandwich with popcorn/chips; take-out sandwich     PM snack: chips/crackers/popcorn    Describe what you typically consume for supper (typical or most recent): 8 pm pr alter - rice, pasta, other carb filled meal   Describe what you typically consume as snacks (typical or most recent): Popcorn, crackers, string cheese, chips   How many ounces of water, or other low calorie drinks, do you drink daily (8 oz=1 glass)? 32 oz   How many ounces of caffeine (coffee, tea, pop) do you drink daily (8 oz=1 glass)? 8 oz   How many ounces of carbonated (pop, beer, sparkling water) drinks do you drinky daily (8 oz=1 glass)? 0 oz   How many ounces of juice, pop, sweet tea, sports drinks, protein drinks, other sweetened drinks, do you drink daily (8 oz=1 glass)? 0 oz   How many ounces of milk do you drink daily (8 oz=1 glass) 0 oz   How often do you drink alcohol?  Never       Eating Habits 11/14/2022   Do you have any dietary restrictions? No   Do you currently binge eat (eat a large amount of food in a short time)? Yes   Are you an emotional eater? No   Do you get up to eat after falling asleep? No   What foods do you crave? savory foods       Dining Out History Reviewed With Patient 11/14/2022   How often do you dine out? Around once a week.   Where do you dine out? (select all that apply) sit-down restaurants, fast food chains, take out   What types of food do you order when you dine out? rice, pasta, sandwiches, pizza         EXERCISE:  Minimal exercise, and sedentary job. Does try to get up and walk around as much as possible recently as she has noticed lower leg swelling. She has an Apple watch she can monitor activity on.     11/14/2022   How often do you exercise? Less than 1 time per week   What is the duration of your exercise (in minutes)? 30 Minutes   What types of exercise do you do? walking, climbing stairs at work   What keeps you from being more active?  I should be more active but I just have not gotten around to it, Shortness of breath, Too tired       ADDITIONAL INFORMATION:  Works as an , M-F long hours.       NUTRITION DIAGNOSIS:  Obesity r/t long history of positive energy balance aeb BMI >30 kg/m2.    INTERVENTION:  Intervention Provided/Education Provided on post-op diet guidelines, vitamins/minerals essential post-operatively, GI anatomy of bariatric surgeries, ways to help prepare for post-op diet guidelines pre-operatively, portion/calorie-control, mindful eating and sources of protein.  Patient demonstrates understanding.     Personal barriers to making and continuing required life changes have been identified, and strategies to overcome those barriers have been recommended AND family and social supports have been assessed and strategies to strengthen those supports have been recommended.    Provided pt with list of goals, RD contact  information and resources listed below via [email or web address].         Questions Reviewed With Patient 11/14/2022   How ready are you to make changes regarding your weight? Number 1 = Not ready at all to make changes up to 10 = very ready. 10   How confident are you that you can change? 1 = Not confident that you will be successful making changes up to 10 = very confident. 10       Expected Engagement: good    GOALS:  Relating To Eating:    Breakfast: protein shake    Lunch: open faced sandwich    Snack: protein snack (sting cheese, hard boiled egg, 1/4 cup nuts)    Dinner: Plate Method    Eat slowly (20-30 minutes per meal), chewing foods well (25 chews per bite/applesauce consistency)    Relating to beverages:    Avoid drinking with meals and for 30 mins after    Drink 64+ ounces of fluid per day    Eliminate carbonation     Reduce caffeine prior to surgery     Relating to dietary supplements:    Start a multivitamin containing iron daily    Relating to activity:    Increase activity as able. Monitor steps/activity on Apple watch. Consider increasing baseline steps by 1000 steps/day weekly    Meal Replacement Shake Options:   *Protein Shake Criteria: no more than 210 Calories, at least 20 grams of protein, and less than 10 grams of sugar   [facility] smoothie (160 Calories, 20 g protein)   Premier Protein (160 Calories, 30 g protein)  Slim Fast Advanced Nutrition (180 Calories, 20 g protein)  Muscle Milk, lactose-free, 17 oz bottle (210 Calories, 30 g protein)  Integrated Supplements, no artificial sugars (110 Calories, 20 g protein)  Genepro, unflavored protein powder (60 Calories, 30 g protein)  Boost/Ensure Max (160 calories, 30 gm protein)   Fairlife Core Power (170 calories, 26 gm protein)  Aldi's Elevation Protein Powder (180 calories, 30 gm protein)     Meal Replacement Bar Options:  [facility] Protein Shake (160 Calories, 15 g protein)  Quest Protein Bars (190 Calories, 20 g protein)  Built Bar (170 Calories,  "15-20 g protein)  One Protein Bar (210 calories, 20 g protein)  De Anda Signature Protein Bar ([employer]) (190 Calories, 21 g protein)  Pure Protein Bars (180 Calories, 21 g protein)    Low Calorie Frozen Meal:  Healthy Choice Power Bowls  Lean Cuisine  Smart Ones  Lyle Nettlesdunia Gomez    The Plate Method:  Http://www.[email or web address]    https://www.cdc.gov/diabetes/images/managing/Diabetes-Manage-Eat-Well-Plate-Graphic_600px.jpg    Protein Sources for Weight Loss  [email or web address]     Carbohydrates  [email or web address]     Mindful Eating  [email or web address]     Summary of Volumetrics Eating Plan  [email or web address]     Diet Guidelines after Weight Loss Surgery  [email or web address]     Seated Exercises for Arms and Legs (can be done before or after surgery)  [email or web address]    Tools for after surgery:  Zookal Dish Sets: bariatric meal size bowls and plates with built in measurement designs on the dishes    Bariatric Pal: https://store.Victrixpal.com/collections/bariatric-dinnerware    Books:  \"Fresh Start Bariatric Cookbook\" by Lisa Miramontes, MS, RDN, CD - Excellent cookbook with post-op recipes and many other resources to check out for ongoing support after surgery    \"Eating Well After Weight Loss Surgery\" by Ramonita Gomez and Dez Banda - Great cookbook with recipes for each diet stage after surgery and recommended portion sizes. Slightly more intensive cooking recipes.    \"Bariatric Mindset Success\" by Isaura Sunshine - book that helps with prevention of weight regain after surgery. Helps train your brain for long term success with weight loss after surgery.    Post-Bariatric Surgery Online Recipe Resources:  Online:    Simpler Recipes: https://www.[employer].Syndiant/bariatric-surgery/recipes    https://www.[employer].com/bariatric-recipes/    https://bariatricJenkins & [employer].Syndiant/bariatric-recipes/     Some recipes on this site have larger " than 1 cup portion size pictures: http://www.[facility].org/weight-loss-surgery/nutrition/recipe-corner.html     https://www.eefoof.com.me/25-bariatric-friendly-weeknight-meals/    Crockpot recipes: https://www.eefoof.com.me/25-bariatric-friendly-crockpot-recipes/    https://[employer].DealCircle/recipes/     https://www.[employer].DealCircle/mbd-recipes    The World According to [employer] Blog: https://theworldaccordingtoeggface.[employer].DealCircle/          Time spent with patient: 44 minutes.  Yenni Sandhu RD, LD

## 2022-11-21 ENCOUNTER — HEALTH MAINTENANCE LETTER (OUTPATIENT)
Age: 27
End: 2022-11-21

## 2022-11-29 ENCOUNTER — TELEPHONE (OUTPATIENT)
Dept: ENDOCRINOLOGY | Facility: CLINIC | Age: 27
End: 2022-11-29

## 2023-02-06 ENCOUNTER — TELEPHONE (OUTPATIENT)
Dept: ENDOCRINOLOGY | Facility: CLINIC | Age: 28
End: 2023-02-06
Payer: COMMERCIAL

## 2023-02-06 NOTE — TELEPHONE ENCOUNTER
MTM appointment no showed, we made one more attempt to reschedule.     Routing back to referring provider and MTM pharmacist.           Scout Madhavi MTM coordinator    Pt is bcbs oos

## 2023-04-16 ENCOUNTER — HEALTH MAINTENANCE LETTER (OUTPATIENT)
Age: 28
End: 2023-04-16

## 2023-09-17 ENCOUNTER — HEALTH MAINTENANCE LETTER (OUTPATIENT)
Age: 28
End: 2023-09-17

## 2024-02-03 ENCOUNTER — HEALTH MAINTENANCE LETTER (OUTPATIENT)
Age: 29
End: 2024-02-03

## 2024-03-21 ENCOUNTER — APPOINTMENT (OUTPATIENT)
Dept: ULTRASOUND IMAGING | Facility: CLINIC | Age: 29
End: 2024-03-21
Attending: EMERGENCY MEDICINE
Payer: COMMERCIAL

## 2024-03-21 ENCOUNTER — HOSPITAL ENCOUNTER (EMERGENCY)
Facility: CLINIC | Age: 29
Discharge: HOME | End: 2024-03-21
Attending: EMERGENCY MEDICINE | Admitting: EMERGENCY MEDICINE
Payer: COMMERCIAL

## 2024-03-21 VITALS
DIASTOLIC BLOOD PRESSURE: 79 MMHG | TEMPERATURE: 97.7 F | SYSTOLIC BLOOD PRESSURE: 126 MMHG | OXYGEN SATURATION: 98 % | HEART RATE: 90 BPM | RESPIRATION RATE: 18 BRPM

## 2024-03-21 DIAGNOSIS — R93.89 ENDOMETRIAL THICKENING ON ULTRASOUND: ICD-10-CM

## 2024-03-21 DIAGNOSIS — R11.2 NAUSEA VOMITING AND DIARRHEA: Primary | ICD-10-CM

## 2024-03-21 DIAGNOSIS — R19.7 NAUSEA VOMITING AND DIARRHEA: Primary | ICD-10-CM

## 2024-03-21 DIAGNOSIS — E87.6 HYPOKALEMIA: ICD-10-CM

## 2024-03-21 DIAGNOSIS — K52.9 GASTROENTERITIS: ICD-10-CM

## 2024-03-21 LAB
ALBUMIN SERPL BCG-MCNC: 4.4 G/DL (ref 3.5–5.2)
ALBUMIN UR-MCNC: 10 MG/DL
ALP SERPL-CCNC: 63 U/L (ref 40–150)
ALT SERPL W P-5'-P-CCNC: 13 U/L (ref 0–50)
ANION GAP SERPL CALCULATED.3IONS-SCNC: 11 MMOL/L (ref 7–15)
APPEARANCE UR: ABNORMAL
AST SERPL W P-5'-P-CCNC: 18 U/L (ref 0–45)
BACTERIA #/AREA URNS HPF: ABNORMAL /HPF
BASOPHILS # BLD AUTO: 0 10E3/UL (ref 0–0.2)
BASOPHILS NFR BLD AUTO: 0 %
BILIRUB SERPL-MCNC: 0.5 MG/DL
BILIRUB UR QL STRIP: NEGATIVE
BUN SERPL-MCNC: 10.3 MG/DL (ref 6–20)
CALCIUM SERPL-MCNC: 8.8 MG/DL (ref 8.6–10)
CHLORIDE SERPL-SCNC: 100 MMOL/L (ref 98–107)
COLOR UR AUTO: ABNORMAL
CREAT SERPL-MCNC: 0.76 MG/DL (ref 0.51–0.95)
DEPRECATED HCO3 PLAS-SCNC: 22 MMOL/L (ref 22–29)
EGFRCR SERPLBLD CKD-EPI 2021: >90 ML/MIN/1.73M2
EOSINOPHIL # BLD AUTO: 0.1 10E3/UL (ref 0–0.7)
EOSINOPHIL NFR BLD AUTO: 2 %
ERYTHROCYTE [DISTWIDTH] IN BLOOD BY AUTOMATED COUNT: 12.7 % (ref 10–15)
GLUCOSE SERPL-MCNC: 97 MG/DL (ref 70–99)
GLUCOSE UR STRIP-MCNC: NEGATIVE MG/DL
HCG INTACT+B SERPL-ACNC: 1137 MIU/ML
HCG SERPL QL: POSITIVE
HCT VFR BLD AUTO: 38.1 % (ref 35–47)
HGB BLD-MCNC: 12.9 G/DL (ref 11.7–15.7)
HGB UR QL STRIP: ABNORMAL
IMM GRANULOCYTES # BLD: 0 10E3/UL
IMM GRANULOCYTES NFR BLD: 0 %
KETONES UR STRIP-MCNC: ABNORMAL MG/DL
LEUKOCYTE ESTERASE UR QL STRIP: ABNORMAL
LIPASE SERPL-CCNC: 15 U/L (ref 13–60)
LYMPHOCYTES # BLD AUTO: 1.5 10E3/UL (ref 0.8–5.3)
LYMPHOCYTES NFR BLD AUTO: 27 %
MAGNESIUM SERPL-MCNC: 1.8 MG/DL (ref 1.7–2.3)
MCH RBC QN AUTO: 29 PG (ref 26.5–33)
MCHC RBC AUTO-ENTMCNC: 33.9 G/DL (ref 31.5–36.5)
MCV RBC AUTO: 86 FL (ref 78–100)
MONOCYTES # BLD AUTO: 0.4 10E3/UL (ref 0–1.3)
MONOCYTES NFR BLD AUTO: 7 %
MUCOUS THREADS #/AREA URNS LPF: PRESENT /LPF
NEUTROPHILS # BLD AUTO: 3.4 10E3/UL (ref 1.6–8.3)
NEUTROPHILS NFR BLD AUTO: 64 %
NITRATE UR QL: NEGATIVE
NRBC # BLD AUTO: 0 10E3/UL
NRBC BLD AUTO-RTO: 0 /100
PH UR STRIP: 6 [PH] (ref 5–7)
PLATELET # BLD AUTO: 359 10E3/UL (ref 150–450)
POTASSIUM SERPL-SCNC: 2.9 MMOL/L (ref 3.4–5.3)
PROT SERPL-MCNC: 7.8 G/DL (ref 6.4–8.3)
RBC # BLD AUTO: 4.45 10E6/UL (ref 3.8–5.2)
RBC URINE: 1 /HPF
SODIUM SERPL-SCNC: 133 MMOL/L (ref 135–145)
SP GR UR STRIP: 1.01 (ref 1–1.03)
SQUAMOUS EPITHELIAL: 14 /HPF
UROBILINOGEN UR STRIP-MCNC: NORMAL MG/DL
WBC # BLD AUTO: 5.4 10E3/UL (ref 4–11)
WBC URINE: 8 /HPF

## 2024-03-21 PROCEDURE — 250N000011 HC RX IP 250 OP 636: Performed by: EMERGENCY MEDICINE

## 2024-03-21 PROCEDURE — 83735 ASSAY OF MAGNESIUM: CPT | Performed by: EMERGENCY MEDICINE

## 2024-03-21 PROCEDURE — 84703 CHORIONIC GONADOTROPIN ASSAY: CPT | Performed by: EMERGENCY MEDICINE

## 2024-03-21 PROCEDURE — 84702 CHORIONIC GONADOTROPIN TEST: CPT | Performed by: EMERGENCY MEDICINE

## 2024-03-21 PROCEDURE — 96365 THER/PROPH/DIAG IV INF INIT: CPT

## 2024-03-21 PROCEDURE — 258N000003 HC RX IP 258 OP 636: Performed by: EMERGENCY MEDICINE

## 2024-03-21 PROCEDURE — 80053 COMPREHEN METABOLIC PANEL: CPT | Performed by: EMERGENCY MEDICINE

## 2024-03-21 PROCEDURE — 96375 TX/PRO/DX INJ NEW DRUG ADDON: CPT

## 2024-03-21 PROCEDURE — 96361 HYDRATE IV INFUSION ADD-ON: CPT

## 2024-03-21 PROCEDURE — 99285 EMERGENCY DEPT VISIT HI MDM: CPT | Mod: 25

## 2024-03-21 PROCEDURE — 83690 ASSAY OF LIPASE: CPT | Performed by: EMERGENCY MEDICINE

## 2024-03-21 PROCEDURE — 76801 OB US < 14 WKS SINGLE FETUS: CPT

## 2024-03-21 PROCEDURE — 85025 COMPLETE CBC W/AUTO DIFF WBC: CPT | Performed by: EMERGENCY MEDICINE

## 2024-03-21 PROCEDURE — 81003 URINALYSIS AUTO W/O SCOPE: CPT | Performed by: EMERGENCY MEDICINE

## 2024-03-21 PROCEDURE — 36415 COLL VENOUS BLD VENIPUNCTURE: CPT | Performed by: EMERGENCY MEDICINE

## 2024-03-21 RX ORDER — POTASSIUM CHLORIDE 7.45 MG/ML
10 INJECTION INTRAVENOUS ONCE
Status: COMPLETED | OUTPATIENT
Start: 2024-03-21 | End: 2024-03-21

## 2024-03-21 RX ORDER — POTASSIUM CHLORIDE 1500 MG/1
20 TABLET, EXTENDED RELEASE ORAL DAILY
Qty: 4 TABLET | Refills: 0 | Status: SHIPPED | OUTPATIENT
Start: 2024-03-21

## 2024-03-21 RX ORDER — ONDANSETRON 2 MG/ML
4 INJECTION INTRAMUSCULAR; INTRAVENOUS ONCE
Status: COMPLETED | OUTPATIENT
Start: 2024-03-21 | End: 2024-03-21

## 2024-03-21 RX ORDER — LOPERAMIDE HYDROCHLORIDE 2 MG/1
2-4 TABLET ORAL 3 TIMES DAILY PRN
Qty: 20 TABLET | Refills: 0 | Status: SHIPPED | OUTPATIENT
Start: 2024-03-21

## 2024-03-21 RX ORDER — ONDANSETRON 4 MG/1
4 TABLET, ORALLY DISINTEGRATING ORAL EVERY 8 HOURS PRN
Qty: 10 TABLET | Refills: 0 | Status: SHIPPED | OUTPATIENT
Start: 2024-03-21

## 2024-03-21 RX ADMIN — POTASSIUM CHLORIDE 10 MEQ: 7.46 INJECTION, SOLUTION INTRAVENOUS at 12:02

## 2024-03-21 RX ADMIN — ONDANSETRON 4 MG: 2 INJECTION INTRAMUSCULAR; INTRAVENOUS at 09:55

## 2024-03-21 RX ADMIN — SODIUM CHLORIDE 1000 ML: 9 INJECTION, SOLUTION INTRAVENOUS at 09:51

## 2024-03-21 ASSESSMENT — ACTIVITIES OF DAILY LIVING (ADL)
ADLS_ACUITY_SCORE: 35

## 2024-03-21 ASSESSMENT — COLUMBIA-SUICIDE SEVERITY RATING SCALE - C-SSRS
1. IN THE PAST MONTH, HAVE YOU WISHED YOU WERE DEAD OR WISHED YOU COULD GO TO SLEEP AND NOT WAKE UP?: NO
2. HAVE YOU ACTUALLY HAD ANY THOUGHTS OF KILLING YOURSELF IN THE PAST MONTH?: NO
6. HAVE YOU EVER DONE ANYTHING, STARTED TO DO ANYTHING, OR PREPARED TO DO ANYTHING TO END YOUR LIFE?: NO

## 2024-03-21 NOTE — CONSULTS
[facility] Gynecology Consultation    Mónica Aly MRN# [id number]   Age: 28 year old YOB: 1995     Date of Admission: 3/21/2024    Reason for consult: Positive pregnancy test and thickened lining on US with associated N/V/D       Requesting physician: Dr. Holden        Level of consult: One-time consult to assist in determining a diagnosis and to recommend an appropriate treatment plan       I was consulted and notified of consult at 1135am and saw the patient at 1230pm, within 90 minutes of notification. This patient did not have an established OBGYN with privileges at this hospital.           Assessment and Plan:     Mónica Aly is a 28 year old  who presented to the ED with complaints of N/V/D and found to have thickened lining and +quant two weeks s/p medical      Thickened Endometrial lining, +pregnancy test   -has had 3d of N/V/D with possible ill contact in her sister who works with children. No associated acute uterine bleeding, uterine cramping or fevers   -likely that patient has gastroenteritis and it is not related to recent .   -+ pregnancy test 2 weeks s/p medical  is considered normal and up to 20% are still positive 4 weeks after medication . US shows no gestational sac/yolk sac/ or fetal pole which is consistent with complete .   -Reviewed findings with patient and given that she is asymptomatic from gyn perspective (no Fevers or Acute AUB) recommend against surgical intervention at this time and instead recommend close outpatient follow up. Plan for repeat bHCG in office at [facility] on 3/22 and then likely every 1-2 weeks until negative.   -Our  will call patient for appt     Gastroenteritis   -continue with supportive treatment           Chief Complaint:   Nausea/vomiting/diarrhea      History is obtained from the patient and care everywhere from [facility]         History of Present Illness:   This  patient is a 28 year old  who presented to the ED today for N/V/D of 3 days duration. States the diarrhea started 3 days ago and then has gotten progressively worse since then. Patient has vomited a couple times each day and is feeling persistently nauseous. Since being in the ED, she received IVF and IV antiemetics and is feeling much better. Patient denies any new foods in the past 7 days but does state that her sister started having diarrhea today and may be an ill contact.     Patient reports that she had an elective  on 3/6 with [facility]. Patient had a dating US on that day that showed an IUP of 7w6d. Patient received mifepristone and misoprostol. She had heavy bleeding and cramping and passed tissue on 3/7. Patient had two more days of period like bleeding and it has tapered since then. Patient reports that her bleeding is now mere spotting and even less in the last few days. No more pelvic cramping. No fevers or chills. No recent intercourse. Plans to go back on OCP. Patient does not feel like her presenting symptoms are related to the N/V/D and does believe the medication was successful in ending the pregnancy and passing the tissue.           Gynecologic History:   Last menstrual period: 24       Contraception:   Birth control pills- Progesterone only pills given HTN and DM.   Sexually transmitted disease history:  none  PAP smear history:  unknown               Obstetrical History:               Past Medical History:   CHTN on lisinopril and amlodipine  DM on insulin   SARANYA  BMI >40  Semaglutide for weight loss         Past Surgical History:   Lap Haley   Tonsillectomy   [place] teeth extraction           Social History:   Neg for tobacco, alcohol, drug use          Family History:   Fm hx of DM and HTN          Immunizations:   Immunization status is unknown          Allergies:     Allergies   Allergen Reactions     No Known Allergies              Medications:     No  current facility-administered medications for this encounter.     Current Outpatient Medications   Medication Sig     loperamide (IMODIUM A-D) 2 MG tablet Take 1-2 tablets (2-4 mg) by mouth 3 times daily as needed for diarrhea     ondansetron (ZOFRAN ODT) 4 MG ODT tab Take 1 tablet (4 mg) by mouth every 8 hours as needed for nausea or vomiting     potassium chloride ER (K-TAB) 20 MEQ CR tablet Take 1 tablet (20 mEq) by mouth daily     acetaminophen (TYLENOL) 325 MG tablet Take 325-650 mg by mouth every 6 hours as needed for headaches     alcohol swab prep pads Use to swab area of injection/hola as directed.     amLODIPine (NORVASC) 10 MG tablet Take 10 mg by mouth At Bedtime      blood glucose (NO BRAND SPECIFIED) test strip Use to test blood sugar 4 times daily or as directed. To accompany: Blood Glucose Monitor Brands: per insurance.     blood glucose calibration (NO BRAND SPECIFIED) solution To accompany: Blood Glucose Monitor Brands: per insurance.     blood glucose monitoring (NO BRAND SPECIFIED) meter device kit Use to test blood sugar 4 times daily or as directed. Preferred blood glucose meter OR supplies to accompany: Blood Glucose Monitor Brands: per insurance.     ibuprofen (ADVIL/MOTRIN) 600 MG tablet Take 1 tablet (600 mg) by mouth every 6 hours as needed for moderate pain     lisinopril (PRINIVIL/ZESTRIL) 40 MG tablet Take 1 tablet (40 mg) by mouth daily     LORazepam (ATIVAN) 0.5 MG tablet Take 0.5 mg by mouth every 6 hours as needed for anxiety     OZEMPIC, 0.25 OR 0.5 MG/DOSE, 2 MG/1.5ML SOPN pen Inject 0.5 mg Subcutaneous every 7 days     potassium chloride ER (KLOR-CON M) 10 MEQ CR tablet Take 10 mEq by mouth 2 times daily     thin (NO BRAND SPECIFIED) lancets Use with lanceting device. To accompany: Blood Glucose Monitor Brands: per insurance.             Review of Systems:   A comprehensive review of systems was performed and found to be negative except as described in this note           Physical Exam:     Vitals were reviewed  Patient Vitals for the past 8 hrs:   BP Temp Temp src Pulse Resp SpO2   03/21/24 1333 126/79 -- -- 90 -- --   03/21/24 1156 -- -- -- -- -- 98 %   03/21/24 1130 132/81 -- -- 88 -- 99 %   03/21/24 0938 -- 97.7  F (36.5  C) Oral -- 18 --   03/21/24 0935 138/81 -- -- 104 -- 98 %     Gen: Alert, oriented, no acute distress   Heart: reg rate   Lungs: Breathing comfortably   Abdomen: Soft non distended, nontender in all 4 quadrants   Pelvic: deferred   Psych: pleasant and cooperative   Neuro: CN grossly intact          Data:     hCG Quantitative   Date Value Ref Range Status   03/21/2024 1,137 (H) <5 mIU/mL Final     Comment:     Adult:0-5 mIU/mL for healthy non-pregnant person  Neonates:Should be within normal ranges by 2 days after birth       US OB <14 Weeks W Transvaginal   Final Result   IMPRESSION:   1.  Abnormal thickened and hypervascular endometrium measuring up to   21 mm, concerning for retained products of conception. Please   correlate with beta hCG trends to exclude other possible neoplastic   etiologies. Recommend gynecological consultation.         ALEC GAMINO MD            SYSTEM ID:  [id number]           Maryam Liu DO   Electronically Signed by  Maryam Liu DO  3/21/2024  [facility]  [facility]

## 2024-03-21 NOTE — LETTER
March 21, 2024      To Whom It May Concern:      Mónica Aly was seen in our Emergency Department today, 03/21/24.  I expect her condition to improve.  She may return to work.    Sincerely,        Anahi PEREZ RN

## 2024-03-21 NOTE — ED PROVIDER NOTES
History     Chief Complaint:  Nausea, Vomiting, & Diarrhea       HPI   Mónica Aly is a 28 year old female with a past medical history of diabetes, HTN, and obesity who presents with vomiting and diarrhea. The patient states that since Monday she has been experiencing vomiting and diarrhea. She states that she has these multiple times a day without any blood in the stool. She states that she also has been burping a lot and has a rotten egg like smell. She states that the symptoms are worse with movement but has no abdominal pain. She denies a fever, dysuria, or vaginal discharge. She did take omeprazole the first two days but no medications for her symptoms since. She states that her sister has similar symptoms but denies any travel outside the [place].  Later in visit the patient notes she had a medical  in early February and has been having some spotting since then.      Independent Historian:   None - Patient Only    Review of External Notes:   Reviewed  OBGYN visit for pregnancy termination and chose a course of  mifepristone followed by misoprostol.      Medications:    amLODIPine   lisinopril   LORazepam   potassium chloride   Insulin glargine  labetalol    Past Medical History:    Bell palsy  Diabetes  Gallstones  HTN  Obesity  DKA  SARANYA  Depression     Past Surgical History:    Tonsillectomy  [place] teeth extraction  Cholecystectomy     Physical Exam   Patient Vitals for the past 24 hrs:   BP Temp Temp src Pulse Resp SpO2   24 1130 132/81 -- -- 88 -- 99 %   24 0938 -- 97.7  F (36.5  C) Oral -- 18 --   24 0935 138/81 -- -- 104 -- 98 %        Physical Exam  General: Alert, appears well-developed and well-nourished. Cooperative.     In mild distress  HEENT:  Head:  Atraumatic  Ears:  External ears are normal  Mouth/Throat:  Oropharynx is without erythema or exudate and mucous membranes are moist.   Eyes:   Conjunctivae normal and EOM are normal. No scleral  icterus.  CV:  Normal rate, regular rhythm, normal heart sounds and radial pulses are 2+ and symmetric.  No murmur.  Resp:  Breath sounds are clear bilaterally    Non-labored, no retractions or accessory muscle use  GI:  Abdomen is soft, no distension, no tenderness. No rebound or guarding.  No CVA tenderness bilaterally  MS:  Normal range of motion. No edema.    Normal strength in all 4 extremities.     Back atraumatic.    No midline cervical, thoracic, or lumbar tenderness  Skin:  Warm and dry.  No rash or lesions noted.  Neuro:   Alert. Normal strength.  GCS: 15  Psych: Normal mood and affect.    Emergency Department Course     Imaging:  US OB <14 Weeks W Transvaginal   Final Result   IMPRESSION:   1.  Abnormal thickened and hypervascular endometrium measuring up to   21 mm, concerning for retained products of conception. Please   correlate with beta hCG trends to exclude other possible neoplastic   etiologies. Recommend gynecological consultation.         ALEC GAMINO MD            SYSTEM ID:  [id number]          Laboratory:  Labs Ordered and Resulted from Time of ED Arrival to Time of ED Departure   COMPREHENSIVE METABOLIC PANEL - Abnormal       Result Value    Sodium 133 (*)     Potassium 2.9 (*)     Carbon Dioxide (CO2) 22      Anion Gap 11      Urea Nitrogen 10.3      Creatinine 0.76      GFR Estimate >90      Calcium 8.8      Chloride 100      Glucose 97      Alkaline Phosphatase 63      AST 18      ALT 13      Protein Total 7.8      Albumin 4.4      Bilirubin Total 0.5     HCG QUALITATIVE PREGNANCY - Abnormal    hCG Serum Qualitative Positive (*)    ROUTINE UA WITH MICROSCOPIC REFLEX TO CULTURE - Abnormal    Color Urine Light Yellow      Appearance Urine Slightly Cloudy (*)     Glucose Urine Negative      Bilirubin Urine Negative      Ketones Urine Trace (*)     Specific Gravity Urine 1.012      Blood Urine Moderate (*)     pH Urine 6.0      Protein Albumin Urine 10 (*)     Urobilinogen Urine Normal       Nitrite Urine Negative      Leukocyte Esterase Urine Small (*)     Bacteria Urine Few (*)     Mucus Urine Present (*)     RBC Urine 1      WBC Urine 8 (*)     Squamous Epithelials Urine 14 (*)    HCG QUANTITATIVE PREGNANCY - Abnormal    hCG Quantitative 1,137 (*)    LIPASE - Normal    Lipase 15     MAGNESIUM - Normal    Magnesium 1.8     CBC WITH PLATELETS AND DIFFERENTIAL    WBC Count 5.4      RBC Count 4.45      Hemoglobin 12.9      Hematocrit 38.1      MCV 86      MCH 29.0      MCHC 33.9      RDW 12.7      Platelet Count 359      % Neutrophils 64      % Lymphocytes 27      % Monocytes 7      % Eosinophils 2      % Basophils 0      % Immature Granulocytes 0      NRBCs per 100 WBC 0      Absolute Neutrophils 3.4      Absolute Lymphocytes 1.5      Absolute Monocytes 0.4      Absolute Eosinophils 0.1      Absolute Basophils 0.0      Absolute Immature Granulocytes 0.0      Absolute NRBCs 0.0     ENTERIC BACTERIA AND VIRUS PANEL BY PCR      Emergency Department Course & Assessments:  Interventions:  Medications   sodium chloride 0.9% BOLUS 1,000 mL (0 mLs Intravenous Stopped 3/21/24 1202)   ondansetron (ZOFRAN) injection 4 mg (4 mg Intravenous $Given 3/21/24 0906)   potassium chloride 10 mEq in 100 mL sterile water infusion (10 mEq Intravenous $New Bag 3/21/24 1202)      Assessments:  0945 Obtained the patients history and performed initial exam  1022 Rechecked the patient    Social Determinants of Health affecting care:   None    Disposition:  The patient was discharged.     Impression & Plan      Medical Decision Making:  Patient is a 28-year-old female who presents with nausea vomiting and diarrhea symptoms.  Initial suspicion for gastroenteritis although patient did note that she had a medication induced  on  under the care of her GYN.  Patient denies any active vaginal bleeding notes some mild spotting but did have significant tissue passage several weeks ago when she initially took the medication.   She does not have any active abdominal pain but her pregnancy test remains positive.  We did follow with an ultrasound of the uterus and there is an abnormal thickened endometrium measuring 21 mm.  Out of concern this may represent retained products of conception I did consult with OB/GYN.  I spoke with Dr. Liu, who evaluated the patient in the emergency department please see her separate consultation note.  Given the patient has no fever, leukocytosis, or lower abdominal pain or persistent vaginal bleeding low concern that this would be representative of retained products at this time.  Given that her pregnancy test is still positive she will require close outpatient follow-up with OB/GYN.  Dr. Liu agreed to follow-up closely with this patient in the next several days for recheck.  Patient was given strict return precautions if she should develop fever, new abdominal pain, vaginal bleeding or any new concerning symptom onset.  Given sick contact with sister recently high concern symptoms do seem representative of a viral gastroenteritis.  Will treat with antinausea medication, antidiarrheals, and potassium replacement.  Her potassium was replaced in the emergency department given potassium of 2.9 here.  Thankfully magnesium normal.  Urinalysis with low suspicion for UTI.  Follow-up with OB/GYN as described above.  Return precautions to the emergency department understood before consideration of discharge.  After all questions answered, discharged home.    Diagnosis:    ICD-10-CM    1. Nausea vomiting and diarrhea  R11.2     R19.7       2. Endometrial thickening on ultrasound  R93.89       3. Gastroenteritis  K52.9       4. Hypokalemia  E87.6          Discharge Medications:  New Prescriptions    LOPERAMIDE (IMODIUM A-D) 2 MG TABLET    Take 1-2 tablets (2-4 mg) by mouth 3 times daily as needed for diarrhea    ONDANSETRON (ZOFRAN ODT) 4 MG ODT TAB    Take 1 tablet (4 mg) by mouth every 8 hours as needed for nausea  or vomiting    POTASSIUM CHLORIDE ER (K-TAB) 20 MEQ CR TABLET    Take 1 tablet (20 mEq) by mouth daily        Scribe Disclosure:  I, Delio Chavarria, am serving as a scribe at 9:39 AM on 3/21/2024 to document services personally performed by Frandy Hair MD based on my observations and the provider's statements to me.     3/21/2024   Frandy Hair MD White, Scott, MD  03/21/24 2240

## 2024-03-21 NOTE — ED TRIAGE NOTES
"Multiple days of n/v/d. States she is having \"sulfur\" belching, concerned about H. Pylori.     Triage Assessment (Adult)       Row Name 03/21/24 0912          Triage Assessment    Airway WDL WDL        Respiratory WDL    Respiratory WDL WDL        Skin Circulation/Temperature WDL    Skin Circulation/Temperature WDL WDL        Cardiac WDL    Cardiac WDL X;rhythm     Pulse Rate & Regularity tachycardic        Peripheral/Neurovascular WDL    Peripheral Neurovascular WDL WDL        Cognitive/Neuro/Behavioral WDL    Cognitive/Neuro/Behavioral WDL WDL                     "

## 2024-03-26 ENCOUNTER — HOSPITAL ENCOUNTER (EMERGENCY)
Facility: CLINIC | Age: 29
Discharge: HOME | End: 2024-03-26
Attending: EMERGENCY MEDICINE | Admitting: EMERGENCY MEDICINE
Payer: COMMERCIAL

## 2024-03-26 VITALS
WEIGHT: 293 LBS | HEIGHT: 69 IN | HEART RATE: 94 BPM | RESPIRATION RATE: 20 BRPM | SYSTOLIC BLOOD PRESSURE: 145 MMHG | BODY MASS INDEX: 43.4 KG/M2 | DIASTOLIC BLOOD PRESSURE: 93 MMHG | TEMPERATURE: 97.8 F | OXYGEN SATURATION: 100 %

## 2024-03-26 DIAGNOSIS — R19.7 VOMITING AND DIARRHEA: ICD-10-CM

## 2024-03-26 DIAGNOSIS — N76.0 BACTERIAL VAGINOSIS: ICD-10-CM

## 2024-03-26 DIAGNOSIS — E87.6 HYPOKALEMIA: ICD-10-CM

## 2024-03-26 DIAGNOSIS — R11.10 VOMITING AND DIARRHEA: ICD-10-CM

## 2024-03-26 DIAGNOSIS — B96.89 BACTERIAL VAGINOSIS: ICD-10-CM

## 2024-03-26 LAB
ALBUMIN SERPL BCG-MCNC: 3.8 G/DL (ref 3.5–5.2)
ALBUMIN UR-MCNC: NEGATIVE MG/DL
ALP SERPL-CCNC: 59 U/L (ref 40–150)
ALT SERPL W P-5'-P-CCNC: 10 U/L (ref 0–50)
ANION GAP SERPL CALCULATED.3IONS-SCNC: 13 MMOL/L (ref 7–15)
APPEARANCE UR: CLEAR
AST SERPL W P-5'-P-CCNC: 18 U/L (ref 0–45)
BASOPHILS # BLD AUTO: 0 10E3/UL (ref 0–0.2)
BASOPHILS NFR BLD AUTO: 0 %
BILIRUB SERPL-MCNC: 0.4 MG/DL
BILIRUB UR QL STRIP: NEGATIVE
BUN SERPL-MCNC: 7.8 MG/DL (ref 6–20)
CALCIUM SERPL-MCNC: 8 MG/DL (ref 8.6–10)
CHLORIDE SERPL-SCNC: 103 MMOL/L (ref 98–107)
CLUE CELLS: PRESENT
COLOR UR AUTO: NORMAL
CREAT SERPL-MCNC: 0.8 MG/DL (ref 0.51–0.95)
DEPRECATED HCO3 PLAS-SCNC: 22 MMOL/L (ref 22–29)
EGFRCR SERPLBLD CKD-EPI 2021: >90 ML/MIN/1.73M2
EOSINOPHIL # BLD AUTO: 0.1 10E3/UL (ref 0–0.7)
EOSINOPHIL NFR BLD AUTO: 3 %
ERYTHROCYTE [DISTWIDTH] IN BLOOD BY AUTOMATED COUNT: 12.9 % (ref 10–15)
GLUCOSE SERPL-MCNC: 98 MG/DL (ref 70–99)
GLUCOSE UR STRIP-MCNC: NEGATIVE MG/DL
HCG INTACT+B SERPL-ACNC: 424 MIU/ML
HCT VFR BLD AUTO: 36.1 % (ref 35–47)
HGB BLD-MCNC: 12.4 G/DL (ref 11.7–15.7)
HGB UR QL STRIP: NEGATIVE
IMM GRANULOCYTES # BLD: 0 10E3/UL
IMM GRANULOCYTES NFR BLD: 0 %
KETONES UR STRIP-MCNC: NEGATIVE MG/DL
LEUKOCYTE ESTERASE UR QL STRIP: NEGATIVE
LYMPHOCYTES # BLD AUTO: 1.3 10E3/UL (ref 0.8–5.3)
LYMPHOCYTES NFR BLD AUTO: 25 %
MAGNESIUM SERPL-MCNC: 1.7 MG/DL (ref 1.7–2.3)
MCH RBC QN AUTO: 29.7 PG (ref 26.5–33)
MCHC RBC AUTO-ENTMCNC: 34.3 G/DL (ref 31.5–36.5)
MCV RBC AUTO: 87 FL (ref 78–100)
MONOCYTES # BLD AUTO: 0.5 10E3/UL (ref 0–1.3)
MONOCYTES NFR BLD AUTO: 9 %
NEUTROPHILS # BLD AUTO: 3.2 10E3/UL (ref 1.6–8.3)
NEUTROPHILS NFR BLD AUTO: 63 %
NITRATE UR QL: NEGATIVE
NRBC # BLD AUTO: 0 10E3/UL
NRBC BLD AUTO-RTO: 0 /100
PH UR STRIP: 6 [PH] (ref 5–7)
PLATELET # BLD AUTO: 300 10E3/UL (ref 150–450)
POTASSIUM SERPL-SCNC: 3.1 MMOL/L (ref 3.4–5.3)
PROT SERPL-MCNC: 7 G/DL (ref 6.4–8.3)
RBC # BLD AUTO: 4.17 10E6/UL (ref 3.8–5.2)
RBC URINE: 0 /HPF
SODIUM SERPL-SCNC: 138 MMOL/L (ref 135–145)
SP GR UR STRIP: 1.01 (ref 1–1.03)
SQUAMOUS EPITHELIAL: <1 /HPF
TRICHOMONAS, WET PREP: ABNORMAL
UROBILINOGEN UR STRIP-MCNC: NORMAL MG/DL
WBC # BLD AUTO: 5.1 10E3/UL (ref 4–11)
WBC URINE: 1 /HPF
WBC'S/HIGH POWER FIELD, WET PREP: ABNORMAL
YEAST, WET PREP: ABNORMAL

## 2024-03-26 PROCEDURE — 83735 ASSAY OF MAGNESIUM: CPT | Performed by: EMERGENCY MEDICINE

## 2024-03-26 PROCEDURE — 36415 COLL VENOUS BLD VENIPUNCTURE: CPT | Performed by: EMERGENCY MEDICINE

## 2024-03-26 PROCEDURE — 99284 EMERGENCY DEPT VISIT MOD MDM: CPT | Mod: 25

## 2024-03-26 PROCEDURE — 85025 COMPLETE CBC W/AUTO DIFF WBC: CPT | Performed by: EMERGENCY MEDICINE

## 2024-03-26 PROCEDURE — 80053 COMPREHEN METABOLIC PANEL: CPT | Performed by: EMERGENCY MEDICINE

## 2024-03-26 PROCEDURE — 96374 THER/PROPH/DIAG INJ IV PUSH: CPT

## 2024-03-26 PROCEDURE — 87491 CHLMYD TRACH DNA AMP PROBE: CPT | Performed by: EMERGENCY MEDICINE

## 2024-03-26 PROCEDURE — 84702 CHORIONIC GONADOTROPIN TEST: CPT | Performed by: EMERGENCY MEDICINE

## 2024-03-26 PROCEDURE — 258N000003 HC RX IP 258 OP 636: Performed by: EMERGENCY MEDICINE

## 2024-03-26 PROCEDURE — 87591 N.GONORRHOEAE DNA AMP PROB: CPT | Performed by: EMERGENCY MEDICINE

## 2024-03-26 PROCEDURE — 87389 HIV-1 AG W/HIV-1&-2 AB AG IA: CPT | Performed by: EMERGENCY MEDICINE

## 2024-03-26 PROCEDURE — 250N000013 HC RX MED GY IP 250 OP 250 PS 637: Performed by: EMERGENCY MEDICINE

## 2024-03-26 PROCEDURE — 250N000011 HC RX IP 250 OP 636: Mod: JZ | Performed by: EMERGENCY MEDICINE

## 2024-03-26 PROCEDURE — 81001 URINALYSIS AUTO W/SCOPE: CPT | Performed by: EMERGENCY MEDICINE

## 2024-03-26 PROCEDURE — 87210 SMEAR WET MOUNT SALINE/INK: CPT | Performed by: EMERGENCY MEDICINE

## 2024-03-26 PROCEDURE — 86780 TREPONEMA PALLIDUM: CPT | Performed by: EMERGENCY MEDICINE

## 2024-03-26 PROCEDURE — 96361 HYDRATE IV INFUSION ADD-ON: CPT

## 2024-03-26 RX ORDER — METOCLOPRAMIDE 5 MG/1
5 TABLET ORAL
Qty: 10 TABLET | Refills: 0 | Status: SHIPPED | OUTPATIENT
Start: 2024-03-26 | End: 2024-03-29

## 2024-03-26 RX ORDER — METRONIDAZOLE 7.5 MG/G
1 GEL VAGINAL DAILY
Qty: 70 G | Refills: 0 | Status: SHIPPED | OUTPATIENT
Start: 2024-03-26

## 2024-03-26 RX ORDER — METOCLOPRAMIDE HYDROCHLORIDE 5 MG/ML
5 INJECTION INTRAMUSCULAR; INTRAVENOUS ONCE
Status: COMPLETED | OUTPATIENT
Start: 2024-03-26 | End: 2024-03-26

## 2024-03-26 RX ORDER — POTASSIUM CHLORIDE 1.5 G/1.58G
40 POWDER, FOR SOLUTION ORAL ONCE
Status: COMPLETED | OUTPATIENT
Start: 2024-03-26 | End: 2024-03-26

## 2024-03-26 RX ADMIN — SODIUM CHLORIDE 1000 ML: 9 INJECTION, SOLUTION INTRAVENOUS at 21:58

## 2024-03-26 RX ADMIN — METOCLOPRAMIDE 5 MG: 5 INJECTION, SOLUTION INTRAMUSCULAR; INTRAVENOUS at 22:23

## 2024-03-26 RX ADMIN — POTASSIUM CHLORIDE 40 MEQ: 1.5 POWDER, FOR SOLUTION ORAL at 23:17

## 2024-03-26 ASSESSMENT — ACTIVITIES OF DAILY LIVING (ADL)
ADLS_ACUITY_SCORE: 35
ADLS_ACUITY_SCORE: 35

## 2024-03-26 ASSESSMENT — COLUMBIA-SUICIDE SEVERITY RATING SCALE - C-SSRS
1. IN THE PAST MONTH, HAVE YOU WISHED YOU WERE DEAD OR WISHED YOU COULD GO TO SLEEP AND NOT WAKE UP?: NO
6. HAVE YOU EVER DONE ANYTHING, STARTED TO DO ANYTHING, OR PREPARED TO DO ANYTHING TO END YOUR LIFE?: NO
2. HAVE YOU ACTUALLY HAD ANY THOUGHTS OF KILLING YOURSELF IN THE PAST MONTH?: NO

## 2024-03-27 LAB
C TRACH DNA SPEC QL NAA+PROBE: NEGATIVE
HIV 1+2 AB+HIV1 P24 AG SERPL QL IA: NONREACTIVE
N GONORRHOEA DNA SPEC QL NAA+PROBE: NEGATIVE
T PALLIDUM AB SER QL: NONREACTIVE

## 2024-03-27 NOTE — ED TRIAGE NOTES
Pt reports being seen last Thursday in the ED. Pt notes increased abdominal pain since. Pt states that she had a recent ectopic pregnancy and is following up with her OB.      Triage Assessment (Adult)       Row Name 03/26/24 2022          Triage Assessment    Airway WDL WDL        Respiratory WDL    Respiratory WDL WDL        Skin Circulation/Temperature WDL    Skin Circulation/Temperature WDL WDL        Cardiac WDL    Cardiac WDL WDL        Peripheral/Neurovascular WDL    Peripheral Neurovascular WDL WDL        Cognitive/Neuro/Behavioral WDL    Cognitive/Neuro/Behavioral WDL WDL

## 2024-03-27 NOTE — DISCHARGE INSTRUCTIONS
Drink plenty of fluids to stay hydrated.  You should take an electrolyte containing solution such as Gatorade or Pedialyte.    Please follow-up with your gynecologist in the next week for repeat evaluation.

## 2024-03-27 NOTE — ED PROVIDER NOTES
"  History     Chief Complaint:  Nausea, Vomiting, & Diarrhea       The history is provided by the patient.      Mónica Aly is a 28 year old female presenting to the ED for evaluation of nausea, vomiting, and diarrhea. The patient reports that she received mifepristone on 3/6/24 for a chemical . She came in to the ED on 3/21/24 for the same symptoms as today, vomiting and diarrhea. Upon doing an ultrasound, they found that her endometrium was abnormally thick.  Gynecology follow-up was arranged.  She says she followed up with the gynecologist she saw on 3/21/24 at [facility] to get a qualitative HCG test yesterday. She has not received the results yet. She felt better after leaving the ED on 3/21/24, and began experiencing symptoms again last night. Since then, she has had 10 instances of diarrhea, and 3 episodes of vomiting. She says her sister has started to experience diarrhea as well. She denies pelvic pain, fever, rashes, swelling in legs, black or bloody stool, or a history of colitis. Note, the patient adds that she recently found a bump in her vaginal area, and would like to be tested for STIs.  She denies any specific high risk exposures or any new sexual contacts.    Review of External Notes:   Obstetrics note reviewed from 2024 when the patient was seen for supervision of high risk pregnancy.    Medications:    Amlodipine  Lisinopril   Lorazepam   Potassium chloride   Insulin glargine  Labetalol    Past Medical History:    Bell palsy  Diabetes  Gallstones  HTN  Obesity  DKA  SARANYA  Depression     Past Surgical History:    Tonsillectomy  [place] teeth extraction  Cholecystectomy     Physical Exam   Patient Vitals for the past 24 hrs:   BP Temp Temp src Pulse Resp SpO2 Height Weight   24 (!) 145/93 97.8  F (36.6  C) Temporal 94 20 100 % 1.753 m (5' 9\") 137 kg (302 lb 0.5 oz)      Physical Exam  Constitutional:       General: She is not in acute distress.     Appearance: Normal " appearance. She is not diaphoretic.   HENT:      Head: Atraumatic.      Mouth/Throat:      Mouth: Mucous membranes are moist.   Eyes:      General: No scleral icterus.     Conjunctiva/sclera: Conjunctivae normal.   Cardiovascular:      Rate and Rhythm: Normal rate and regular rhythm.      Heart sounds: Normal heart sounds.   Pulmonary:      Effort: No respiratory distress.      Breath sounds: Normal breath sounds.   Abdominal:      General: Abdomen is flat. There is no distension.      Tenderness: There is no abdominal tenderness.   Genitourinary:     Comments: No external vaginal lesions.  No cervical motion tenderness.  Scant vaginal discharge.  Vaginal Koza appears normal.  There is what appears to be a polyp or flesh colored mass of the left introitus.  This does not appear to be a Bartholin abscess.  It is not tender.  No erythema or evidence of infection at this point.  Musculoskeletal:      Cervical back: Neck supple.   Skin:     General: Skin is warm.      Findings: No rash.   Neurological:      Mental Status: She is alert.   Psychiatric:         Mood and Affect: Mood normal.         Behavior: Behavior normal.           Emergency Department Course   Laboratory:  Labs Ordered and Resulted from Time of ED Arrival to Time of ED Departure   COMPREHENSIVE METABOLIC PANEL - Abnormal       Result Value    Sodium 138      Potassium 3.1 (*)     Carbon Dioxide (CO2) 22      Anion Gap 13      Urea Nitrogen 7.8      Creatinine 0.80      GFR Estimate >90      Calcium 8.0 (*)     Chloride 103      Glucose 98      Alkaline Phosphatase 59      AST 18      ALT 10      Protein Total 7.0      Albumin 3.8      Bilirubin Total 0.4     HCG QUANTITATIVE PREGNANCY - Abnormal    hCG Quantitative 424 (*)    WET PREPARATION - Abnormal    Trichomonas Absent      Yeast Absent      Clue Cells Present (*)     WBCs/high power field 2+ (*)    MAGNESIUM - Normal    Magnesium 1.7     ROUTINE UA WITH MICROSCOPIC REFLEX TO CULTURE - Normal     Color Urine Light Yellow      Appearance Urine Clear      Glucose Urine Negative      Bilirubin Urine Negative      Ketones Urine Negative      Specific Gravity Urine 1.007      Blood Urine Negative      pH Urine 6.0      Protein Albumin Urine Negative      Urobilinogen Urine Normal      Nitrite Urine Negative      Leukocyte Esterase Urine Negative      RBC Urine 0      WBC Urine 1      Squamous Epithelials Urine <1     CBC WITH PLATELETS AND DIFFERENTIAL    WBC Count 5.1      RBC Count 4.17      Hemoglobin 12.4      Hematocrit 36.1      MCV 87      MCH 29.7      MCHC 34.3      RDW 12.9      Platelet Count 300      % Neutrophils 63      % Lymphocytes 25      % Monocytes 9      % Eosinophils 3      % Basophils 0      % Immature Granulocytes 0      NRBCs per 100 WBC 0      Absolute Neutrophils 3.2      Absolute Lymphocytes 1.3      Absolute Monocytes 0.5      Absolute Eosinophils 0.1      Absolute Basophils 0.0      Absolute Immature Granulocytes 0.0      Absolute NRBCs 0.0     HIV ANTIGEN ANTIBODY COMBO   TREPONEMA ABS W REFLEX TO RPR AND TITER   CHLAMYDIA TRACHOMATIS PCR   NEISSERIA GONORRHOEAE PCR   ENTERIC BACTERIA AND VIRUS PANEL BY PCR   C. DIFFICILE TOXIN B PCR WITH REFLEX TO C. DIFFICILE ANTIGEN AND TOXINS A/B EIA     Emergency Department Course & Assessments:    Interventions:  Medications   sodium chloride 0.9% BOLUS 1,000 mL (0 mLs Intravenous Stopped 3/26/24 2339)   metoclopramide (REGLAN) injection 5 mg (5 mg Intravenous $Given 3/26/24 2223)   potassium chloride (KLOR-CON) Packet 40 mEq (40 mEq Oral $Given 3/26/24 2317)      Assessments/Consultations/Discussion of Management or Tests:   ED Course as of 03/26/24 2341   Tue Mar 26, 2024   2130 I obtained history and examined the patient as noted above.     2210 I rechecked the patient and performed a pelvic exam.      Disposition:  The patient was discharged.     Impression & Plan      Medical Decision Making:  This patient is a 28-year-old woman who  presents to the ED with vomiting and diarrhea.  This began following a mifepristone induced .  Quantitative hCG is downtrending.  No pelvic pain or tenderness.  Should continue to follow with gynecology as an outpatient.    She may have gastroenteritis as well.  She is not drinking fluids though.  She is mildly hypokalemic.  She is advised to take electrolyte containing solution for oral hydration.    Patient does appear to have bacterial vaginosis.  She will be treated with with topical gel rather than oral given that she is already nauseous and vomiting.  As stated she will be following up with gynecology where she will get reevaluation of the vaginal polyp.    Diagnosis:    ICD-10-CM    1. Bacterial vaginosis  N76.0     B96.89       2. Vomiting and diarrhea  R11.10     R19.7       3. Hypokalemia  E87.6          Discharge Medications:  New Prescriptions    METOCLOPRAMIDE (REGLAN) 5 MG TABLET    Take 1 tablet (5 mg) by mouth 4 times daily (before meals and nightly) for 10 doses    METRONIDAZOLE (METROGEL) 0.75 % VAGINAL GEL    Place 1 applicator (5 g) vaginally daily      Scribe Disclosure:  I, Dulce Gregorio, am serving as a scribe at 9:40 PM on 3/26/2024 to document services personally performed by Oleg Arrington MD based on my observations and the provider's statements to me.     3/26/2024   Oleg Arrington MD McRoberts, Sean Edward, MD  [phone]

## 2024-06-22 ENCOUNTER — HEALTH MAINTENANCE LETTER (OUTPATIENT)
Age: 29
End: 2024-06-22

## 2024-11-09 ENCOUNTER — HEALTH MAINTENANCE LETTER (OUTPATIENT)
Age: 29
End: 2024-11-09

## 2024-12-20 ENCOUNTER — HOSPITAL ENCOUNTER (EMERGENCY)
Facility: CLINIC | Age: 29
Discharge: LEFT BEFORE DISCHARGE | End: 2024-12-21
Admitting: EMERGENCY MEDICINE
Payer: COMMERCIAL

## 2024-12-20 VITALS
HEART RATE: 89 BPM | RESPIRATION RATE: 16 BRPM | SYSTOLIC BLOOD PRESSURE: 177 MMHG | DIASTOLIC BLOOD PRESSURE: 119 MMHG | OXYGEN SATURATION: 100 % | TEMPERATURE: 98 F

## 2024-12-20 PROCEDURE — 93005 ELECTROCARDIOGRAM TRACING: CPT

## 2024-12-20 PROCEDURE — 99281 EMR DPT VST MAYX REQ PHY/QHP: CPT

## 2024-12-20 ASSESSMENT — COLUMBIA-SUICIDE SEVERITY RATING SCALE - C-SSRS
2. HAVE YOU ACTUALLY HAD ANY THOUGHTS OF KILLING YOURSELF IN THE PAST MONTH?: NO
6. HAVE YOU EVER DONE ANYTHING, STARTED TO DO ANYTHING, OR PREPARED TO DO ANYTHING TO END YOUR LIFE?: NO
1. IN THE PAST MONTH, HAVE YOU WISHED YOU WERE DEAD OR WISHED YOU COULD GO TO SLEEP AND NOT WAKE UP?: NO

## 2024-12-21 ENCOUNTER — HOSPITAL ENCOUNTER (EMERGENCY)
Facility: CLINIC | Age: 29
Discharge: HOME | End: 2024-12-21
Attending: EMERGENCY MEDICINE | Admitting: EMERGENCY MEDICINE
Payer: COMMERCIAL

## 2024-12-21 VITALS
HEART RATE: 78 BPM | RESPIRATION RATE: 18 BRPM | DIASTOLIC BLOOD PRESSURE: 120 MMHG | TEMPERATURE: 97.5 F | SYSTOLIC BLOOD PRESSURE: 166 MMHG | OXYGEN SATURATION: 100 %

## 2024-12-21 DIAGNOSIS — R51.9 NONINTRACTABLE HEADACHE, UNSPECIFIED CHRONICITY PATTERN, UNSPECIFIED HEADACHE TYPE: ICD-10-CM

## 2024-12-21 PROCEDURE — 99284 EMERGENCY DEPT VISIT MOD MDM: CPT | Mod: 25

## 2024-12-21 PROCEDURE — 258N000003 HC RX IP 258 OP 636: Performed by: EMERGENCY MEDICINE

## 2024-12-21 PROCEDURE — 96375 TX/PRO/DX INJ NEW DRUG ADDON: CPT

## 2024-12-21 PROCEDURE — 250N000011 HC RX IP 250 OP 636: Performed by: EMERGENCY MEDICINE

## 2024-12-21 PROCEDURE — 96365 THER/PROPH/DIAG IV INF INIT: CPT

## 2024-12-21 RX ORDER — METOCLOPRAMIDE HYDROCHLORIDE 5 MG/ML
10 INJECTION INTRAMUSCULAR; INTRAVENOUS ONCE
Status: COMPLETED | OUTPATIENT
Start: 2024-12-21 | End: 2024-12-21

## 2024-12-21 RX ORDER — DEXAMETHASONE SODIUM PHOSPHATE 10 MG/ML
10 INJECTION, SOLUTION INTRAMUSCULAR; INTRAVENOUS ONCE
Status: COMPLETED | OUTPATIENT
Start: 2024-12-21 | End: 2024-12-21

## 2024-12-21 RX ORDER — MAGNESIUM SULFATE HEPTAHYDRATE 40 MG/ML
2 INJECTION, SOLUTION INTRAVENOUS ONCE
Status: COMPLETED | OUTPATIENT
Start: 2024-12-21 | End: 2024-12-21

## 2024-12-21 RX ORDER — KETOROLAC TROMETHAMINE 15 MG/ML
15 INJECTION, SOLUTION INTRAMUSCULAR; INTRAVENOUS ONCE
Status: COMPLETED | OUTPATIENT
Start: 2024-12-21 | End: 2024-12-21

## 2024-12-21 RX ORDER — DIPHENHYDRAMINE HYDROCHLORIDE 50 MG/ML
25 INJECTION INTRAMUSCULAR; INTRAVENOUS ONCE
Status: COMPLETED | OUTPATIENT
Start: 2024-12-21 | End: 2024-12-21

## 2024-12-21 RX ADMIN — METOCLOPRAMIDE 10 MG: 5 INJECTION, SOLUTION INTRAMUSCULAR; INTRAVENOUS at 13:09

## 2024-12-21 RX ADMIN — KETOROLAC TROMETHAMINE 15 MG: 15 INJECTION, SOLUTION INTRAMUSCULAR; INTRAVENOUS at 13:10

## 2024-12-21 RX ADMIN — SODIUM CHLORIDE 1000 ML: 9 INJECTION, SOLUTION INTRAVENOUS at 13:09

## 2024-12-21 RX ADMIN — MAGNESIUM SULFATE HEPTAHYDRATE 2 G: 40 INJECTION, SOLUTION INTRAVENOUS at 13:11

## 2024-12-21 RX ADMIN — DIPHENHYDRAMINE HYDROCHLORIDE 25 MG: 50 INJECTION, SOLUTION INTRAMUSCULAR; INTRAVENOUS at 13:09

## 2024-12-21 RX ADMIN — DEXAMETHASONE SODIUM PHOSPHATE 10 MG: 10 INJECTION, SOLUTION INTRAMUSCULAR; INTRAVENOUS at 14:33

## 2024-12-21 ASSESSMENT — ACTIVITIES OF DAILY LIVING (ADL)
ADLS_ACUITY_SCORE: 46

## 2024-12-21 ASSESSMENT — COLUMBIA-SUICIDE SEVERITY RATING SCALE - C-SSRS
6. HAVE YOU EVER DONE ANYTHING, STARTED TO DO ANYTHING, OR PREPARED TO DO ANYTHING TO END YOUR LIFE?: NO
2. HAVE YOU ACTUALLY HAD ANY THOUGHTS OF KILLING YOURSELF IN THE PAST MONTH?: NO
1. IN THE PAST MONTH, HAVE YOU WISHED YOU WERE DEAD OR WISHED YOU COULD GO TO SLEEP AND NOT WAKE UP?: NO

## 2024-12-21 NOTE — ED PROVIDER NOTES
Emergency Department Note      History of Present Illness     Chief Complaint  Headache    GERALDINE Aly is a 29 year old female who presents to the emergency room with what appears to be a headache that is been present for about 6 days.  She states that she has a history of migraines and used to get them frequently, but no longer does she get them at least for the last several years.  She states that 6 days ago this headache started gradually, it is on the right side and accompanied by nausea but no vomiting.  Pain continues to wax and wane and typically she is able to treat the migraine she gets her headaches that she gets with Excedrin Migraine and rest and fluids but that has not been enough to stop the patient's pain discomfort.  She does endorse photophobia, denies any numbness or tingling, no trauma, no visual field cuts, no personality changes or fevers.      Independent Historian  No    Review of External Notes  Yes I reviewed the patient's last visit with her family practice doctor on 12-6-2024 where the patient was seen for fear flying.      Past Medical History   Medical History and Problem List  Past Medical History:   Diagnosis Date    Bell palsy April 2014    Cholecystolithiasis     Chronic cough     Diabetes (H)     Gallstones     Hypertension     Nausea and vomiting 3/11/2020    Obese        Medications  acetaminophen (TYLENOL) 325 MG tablet  alcohol swab prep pads  amLODIPine (NORVASC) 10 MG tablet  blood glucose (NO BRAND SPECIFIED) test strip  blood glucose calibration (NO BRAND SPECIFIED) solution  blood glucose monitoring (NO BRAND SPECIFIED) meter device kit  ibuprofen (ADVIL/MOTRIN) 600 MG tablet  lisinopril (PRINIVIL/ZESTRIL) 40 MG tablet  loperamide (IMODIUM A-D) 2 MG tablet  LORazepam (ATIVAN) 0.5 MG tablet  metroNIDAZOLE (METROGEL) 0.75 % vaginal gel  ondansetron (ZOFRAN ODT) 4 MG ODT tab  OZEMPIC, 0.25 OR 0.5 MG/DOSE, 2 MG/1.5ML SOPN pen  potassium chloride ER (K-TAB) 20 MEQ CR  tablet  potassium chloride ER (KLOR-CON M) 10 MEQ CR tablet  thin (NO BRAND SPECIFIED) lancets        Surgical History   Past Surgical History:   Procedure Laterality Date    ENT SURGERY      tonsillectomy    ENT SURGERY      wisdom teeth extraction    LAPAROSCOPIC CHOLECYSTECTOMY N/A 3/18/2020    Procedure: LAPAROSCOPIC CHOLECYSTECTOMY;  Surgeon: Jasper Becker MD;  Location: [facility] NONSPECIFIC PROCEDURE      Tonsillectomy         Physical Exam   Patient Vitals for the past 24 hrs:   BP Temp Temp src Pulse Resp SpO2   12/21/24 1049 (!) 166/120 97.5  F (36.4  C) Temporal 78 18 100 %       Physical Exam  Vitals: reviewed by me  General: Pt seen on hospital [facility], pleasant, cooperative, and alert to conversation  Eyes: Tracking well, clear conjunctiva BL  ENT: MMM, midline trachea.   Lungs: No tachypnea, no accessory muscle use. No respiratory distress.   CV: Rate as above  MSK: no joint effusion.  No evidence of trauma  Skin: No rash  Neuro: Clear speech and no facial droop.  Psych: Not RIS, no e/o AH/      Diagnostics   Lab Results   Labs Ordered and Resulted from Time of ED Arrival to Time of ED Departure - No data to display    Imaging  No orders to display         ED Course      Medications Administered   Medications   sodium chloride 0.9% BOLUS 1,000 mL (1,000 mLs Intravenous $New Bag 12/21/24 1309)   dexAMETHasone PF (DECADRON) injection 10 mg (has no administration in time range)   metoclopramide (REGLAN) injection 10 mg (10 mg Intravenous $Given 12/21/24 1309)   diphenhydrAMINE (BENADRYL) injection 25 mg (25 mg Intravenous $Given 12/21/24 1309)   ketorolac (TORADOL) injection 15 mg (15 mg Intravenous $Given 12/21/24 1310)   magnesium sulfate 2 g in 50 mL sterile water intermittent infusion (2 g Intravenous $New Bag 12/21/24 1311)              Optional/Additional Documentation  None      Medical Decision Making / Diagnosis         MDM  This is a very pleasant 29-year-old female who presents to the  emergency room with what appears to be a headache it has been present for 6 days.  This was not thunderclap in origin and I did not think that a CT scan was indicated based on the lack of any other neurologic symptoms and headache being similar to previous.  She has a reassuring neurologic exam, does feel significant improvement here with a standard migraine cocktail that did not have any narcotics.  I do think that she needs to still follow with her regular doctor in a week ahead and have given her a dose of Decadron as well to help prevent any rebound of the headache.  She feels pleased with her care, has reassuring vital signs, I do not think that antibiotics would be helpful or that wide labs would be helpful either.  She feels comfortable going home, red flags for when to come back to the ER were discussed in detail.  She is highly reliable appearing and I do think that she will follow with her regular doctor in the week ahead.    ICD-10 Codes:    ICD-10-CM    1. Nonintractable headache, unspecified chronicity pattern, unspecified headache type  R51.9              Discharge Medications  New Prescriptions    No medications on file                  Evgeny Liao MD  12/21/24 7445

## 2024-12-21 NOTE — ED TRIAGE NOTES
Pt sts she has had a migraine for 6 days     Triage Assessment (Adult)       Row Name 12/21/24 1050          Triage Assessment    Airway WDL WDL        Respiratory WDL    Respiratory WDL WDL        Skin Circulation/Temperature WDL    Skin Circulation/Temperature WDL WDL        Cardiac WDL    Cardiac WDL WDL        Peripheral/Neurovascular WDL    Peripheral Neurovascular WDL WDL        Cognitive/Neuro/Behavioral WDL    Cognitive/Neuro/Behavioral WDL WDL

## 2024-12-21 NOTE — DISCHARGE INSTRUCTIONS
As we discussed, it does sound like we were able to give you some medication to help with your headache.  Please come back to the ER immediately with any other concerns you have, or any new symptoms.  Please do take over-the-counter medication to help with your symptoms and follow with your regular doctor in the next 1 week as well to make sure that things are getting better.  Come back with any new concerns you have.

## 2024-12-21 NOTE — ED TRIAGE NOTES
Patient arrives with migraine for 5 days. She is concerned about it as she has high blood pressure. Has recently run out of BP meds. Denies blurry vision or speech difficulty.      Triage Assessment (Adult)       Row Name 12/20/24 3854          Triage Assessment    Airway WDL WDL        Respiratory WDL    Respiratory WDL WDL        Skin Circulation/Temperature WDL    Skin Circulation/Temperature WDL WDL        Cardiac WDL    Cardiac WDL WDL        Peripheral/Neurovascular WDL    Peripheral Neurovascular WDL WDL        Cognitive/Neuro/Behavioral WDL    Cognitive/Neuro/Behavioral WDL WDL

## 2025-03-02 ENCOUNTER — HEALTH MAINTENANCE LETTER (OUTPATIENT)
Age: 30
End: 2025-03-02

## 2025-05-26 ENCOUNTER — APPOINTMENT (OUTPATIENT)
Dept: GENERAL RADIOLOGY | Facility: CLINIC | Age: 30
End: 2025-05-26
Attending: EMERGENCY MEDICINE
Payer: COMMERCIAL

## 2025-05-26 ENCOUNTER — HOSPITAL ENCOUNTER (EMERGENCY)
Facility: CLINIC | Age: 30
Discharge: HOME | End: 2025-05-26
Attending: EMERGENCY MEDICINE | Admitting: EMERGENCY MEDICINE
Payer: COMMERCIAL

## 2025-05-26 VITALS
SYSTOLIC BLOOD PRESSURE: 134 MMHG | BODY MASS INDEX: 44.41 KG/M2 | TEMPERATURE: 98.3 F | OXYGEN SATURATION: 97 % | HEART RATE: 111 BPM | HEIGHT: 68 IN | DIASTOLIC BLOOD PRESSURE: 92 MMHG | RESPIRATION RATE: 16 BRPM | WEIGHT: 293 LBS
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
## 2025-05-26 DIAGNOSIS — M25.561 ACUTE PAIN OF RIGHT KNEE: ICD-10-CM

## 2025-05-26 PROCEDURE — 99283 EMERGENCY DEPT VISIT LOW MDM: CPT

## 2025-05-26 PROCEDURE — 73562 X-RAY EXAM OF KNEE 3: CPT | Mod: RT

## 2025-05-26 PROCEDURE — 250N000013 HC RX MED GY IP 250 OP 250 PS 637: Performed by: EMERGENCY MEDICINE

## 2025-05-26 RX ORDER — ACETAMINOPHEN 325 MG/10.15ML
650 LIQUID ORAL ONCE
Status: COMPLETED | OUTPATIENT
Start: 2025-05-26 | End: 2025-05-26

## 2025-05-26 RX ORDER — IBUPROFEN 100 MG/5ML
600 SUSPENSION ORAL ONCE
Status: COMPLETED | OUTPATIENT
Start: 2025-05-26 | End: 2025-05-26

## 2025-05-26 RX ADMIN — IBUPROFEN 600 MG: 200 SUSPENSION ORAL at 03:46

## 2025-05-26 RX ADMIN — ACETAMINOPHEN 650 MG: 325 SUSPENSION ORAL at 03:45

## 2025-05-26 ASSESSMENT — ACTIVITIES OF DAILY LIVING (ADL)
ADLS_ACUITY_SCORE: 46
ADLS_ACUITY_SCORE: 46
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
## 2025-05-26 NOTE — DISCHARGE INSTRUCTIONS
Ibuprofen and acetaminophen for pain as needed  Ice on knee as needed, elevate knee  Crutches as needed for pain  If not improving over next week, would recommend follow-up with orthopedics as sometimes you need additional imaging such as repeat xray or MRI of knee
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
## 2025-06-09 ENCOUNTER — HOSPITAL ENCOUNTER (EMERGENCY)
Facility: CLINIC | Age: 30
Discharge: HOME | End: 2025-06-10
Attending: EMERGENCY MEDICINE | Admitting: EMERGENCY MEDICINE
Payer: COMMERCIAL

## 2025-06-09 DIAGNOSIS — R51.9 NONINTRACTABLE HEADACHE, UNSPECIFIED CHRONICITY PATTERN, UNSPECIFIED HEADACHE TYPE: ICD-10-CM

## 2025-06-09 LAB
BASOPHILS # BLD AUTO: 0 10E3/UL (ref 0–0.2)
BASOPHILS NFR BLD AUTO: 0 %
EOSINOPHIL # BLD AUTO: 0.1 10E3/UL (ref 0–0.7)
EOSINOPHIL NFR BLD AUTO: 3 %
ERYTHROCYTE [DISTWIDTH] IN BLOOD BY AUTOMATED COUNT: 12.6 % (ref 10–15)
HCT VFR BLD AUTO: 39.9 % (ref 35–47)
HGB BLD-MCNC: 13.9 G/DL (ref 11.7–15.7)
IMM GRANULOCYTES # BLD: 0 10E3/UL
IMM GRANULOCYTES NFR BLD: 0 %
LYMPHOCYTES # BLD AUTO: 2 10E3/UL (ref 0.8–5.3)
LYMPHOCYTES NFR BLD AUTO: 37 %
MCH RBC QN AUTO: 29.3 PG (ref 26.5–33)
MCHC RBC AUTO-ENTMCNC: 34.8 G/DL (ref 31.5–36.5)
MCV RBC AUTO: 84 FL (ref 78–100)
MONOCYTES # BLD AUTO: 0.3 10E3/UL (ref 0–1.3)
MONOCYTES NFR BLD AUTO: 6 %
NEUTROPHILS # BLD AUTO: 2.8 10E3/UL (ref 1.6–8.3)
NEUTROPHILS NFR BLD AUTO: 54 %
NRBC # BLD AUTO: 0 10E3/UL
NRBC BLD AUTO-RTO: 0 /100
PLATELET # BLD AUTO: 329 10E3/UL (ref 150–450)
RBC # BLD AUTO: 4.74 10E6/UL (ref 3.8–5.2)
WBC # BLD AUTO: 5.3 10E3/UL (ref 4–11)

## 2025-06-09 PROCEDURE — 36415 COLL VENOUS BLD VENIPUNCTURE: CPT | Performed by: EMERGENCY MEDICINE

## 2025-06-09 PROCEDURE — 82310 ASSAY OF CALCIUM: CPT | Performed by: EMERGENCY MEDICINE

## 2025-06-09 PROCEDURE — 99285 EMERGENCY DEPT VISIT HI MDM: CPT | Mod: 25

## 2025-06-09 PROCEDURE — 85025 COMPLETE CBC W/AUTO DIFF WBC: CPT | Performed by: EMERGENCY MEDICINE

## 2025-06-09 PROCEDURE — 93005 ELECTROCARDIOGRAM TRACING: CPT

## 2025-06-09 ASSESSMENT — ACTIVITIES OF DAILY LIVING (ADL)
ADLS_ACUITY_SCORE: 46

## 2025-06-10 ENCOUNTER — APPOINTMENT (OUTPATIENT)
Dept: CT IMAGING | Facility: CLINIC | Age: 30
End: 2025-06-10
Attending: EMERGENCY MEDICINE
Payer: COMMERCIAL

## 2025-06-10 VITALS
HEART RATE: 75 BPM | BODY MASS INDEX: 44.41 KG/M2 | DIASTOLIC BLOOD PRESSURE: 100 MMHG | OXYGEN SATURATION: 99 % | RESPIRATION RATE: 18 BRPM | SYSTOLIC BLOOD PRESSURE: 135 MMHG | TEMPERATURE: 97.6 F | WEIGHT: 293 LBS | HEIGHT: 68 IN

## 2025-06-10 LAB
ANION GAP SERPL CALCULATED.3IONS-SCNC: 9 MMOL/L (ref 7–15)
BUN SERPL-MCNC: 7.2 MG/DL (ref 6–20)
CALCIUM SERPL-MCNC: 9 MG/DL (ref 8.8–10.4)
CHLORIDE SERPL-SCNC: 102 MMOL/L (ref 98–107)
CREAT SERPL-MCNC: 0.82 MG/DL (ref 0.51–0.95)
EGFRCR SERPLBLD CKD-EPI 2021: >90 ML/MIN/1.73M2
GLUCOSE SERPL-MCNC: 98 MG/DL (ref 70–99)
HCG UR QL: NEGATIVE
HCO3 SERPL-SCNC: 25 MMOL/L (ref 22–29)
HOLD SPECIMEN: NORMAL
POTASSIUM SERPL-SCNC: 3.6 MMOL/L (ref 3.4–5.3)
SODIUM SERPL-SCNC: 136 MMOL/L (ref 135–145)

## 2025-06-10 PROCEDURE — 70496 CT ANGIOGRAPHY HEAD: CPT

## 2025-06-10 PROCEDURE — 81025 URINE PREGNANCY TEST: CPT | Performed by: EMERGENCY MEDICINE

## 2025-06-10 PROCEDURE — 250N000009 HC RX 250: Performed by: EMERGENCY MEDICINE

## 2025-06-10 PROCEDURE — 70450 CT HEAD/BRAIN W/O DYE: CPT | Mod: XU

## 2025-06-10 PROCEDURE — 250N000011 HC RX IP 250 OP 636: Performed by: EMERGENCY MEDICINE

## 2025-06-10 PROCEDURE — 96374 THER/PROPH/DIAG INJ IV PUSH: CPT | Mod: 59

## 2025-06-10 PROCEDURE — 96361 HYDRATE IV INFUSION ADD-ON: CPT

## 2025-06-10 PROCEDURE — 258N000003 HC RX IP 258 OP 636: Performed by: EMERGENCY MEDICINE

## 2025-06-10 PROCEDURE — 96375 TX/PRO/DX INJ NEW DRUG ADDON: CPT

## 2025-06-10 RX ORDER — KETOROLAC TROMETHAMINE 15 MG/ML
15 INJECTION, SOLUTION INTRAMUSCULAR; INTRAVENOUS ONCE
Status: COMPLETED | OUTPATIENT
Start: 2025-06-10 | End: 2025-06-10

## 2025-06-10 RX ORDER — IOPAMIDOL 755 MG/ML
67 INJECTION, SOLUTION INTRAVASCULAR ONCE
Status: COMPLETED | OUTPATIENT
Start: 2025-06-10 | End: 2025-06-10

## 2025-06-10 RX ORDER — DIPHENHYDRAMINE HYDROCHLORIDE 50 MG/ML
50 INJECTION, SOLUTION INTRAMUSCULAR; INTRAVENOUS ONCE
Status: COMPLETED | OUTPATIENT
Start: 2025-06-10 | End: 2025-06-10

## 2025-06-10 RX ADMIN — KETOROLAC TROMETHAMINE 15 MG: 15 INJECTION, SOLUTION INTRAMUSCULAR; INTRAVENOUS at 02:01

## 2025-06-10 RX ADMIN — DIPHENHYDRAMINE HYDROCHLORIDE 50 MG: 50 INJECTION, SOLUTION INTRAMUSCULAR; INTRAVENOUS at 01:57

## 2025-06-10 RX ADMIN — PROCHLORPERAZINE EDISYLATE 5 MG: 5 INJECTION INTRAMUSCULAR; INTRAVENOUS at 02:03

## 2025-06-10 RX ADMIN — IOPAMIDOL 67 ML: 755 INJECTION, SOLUTION INTRAVENOUS at 00:56

## 2025-06-10 RX ADMIN — SODIUM CHLORIDE 80 ML: 9 INJECTION, SOLUTION INTRAVENOUS at 00:56

## 2025-06-10 RX ADMIN — SODIUM CHLORIDE 1000 ML: 0.9 INJECTION, SOLUTION INTRAVENOUS at 01:56

## 2025-06-10 ASSESSMENT — ACTIVITIES OF DAILY LIVING (ADL)
ADLS_ACUITY_SCORE: 46
ADLS_ACUITY_SCORE: 46

## 2025-06-10 NOTE — ED TRIAGE NOTES
Since 4pm: Headache, throbbing at back of neck (500mg tylenol, 50(?) sumatriptan without relief), Dizziness, Excessive urination, numb/tingling L hand/arm.  Hx DM (bg 111 at home), and Htn (takes meds) but BPs at home 160s/110s  Not on OCPs  Last menses began 5 days ago, denies chance of pregnancy
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
## 2025-06-11 LAB
ATRIAL RATE - MUSE: 91 BPM
DIASTOLIC BLOOD PRESSURE - MUSE: NORMAL MMHG
INTERPRETATION ECG - MUSE: NORMAL
P AXIS - MUSE: 27 DEGREES
PR INTERVAL - MUSE: 160 MS
QRS DURATION - MUSE: 84 MS
QT - MUSE: 354 MS
QTC - MUSE: 435 MS
R AXIS - MUSE: -12 DEGREES
SYSTOLIC BLOOD PRESSURE - MUSE: NORMAL MMHG
T AXIS - MUSE: 7 DEGREES
VENTRICULAR RATE- MUSE: 91 BPM

## 2025-06-21 ENCOUNTER — HEALTH MAINTENANCE LETTER (OUTPATIENT)
Age: 30
End: 2025-06-21

## 2025-07-12 ENCOUNTER — HEALTH MAINTENANCE LETTER (OUTPATIENT)
Age: 30
End: 2025-07-12

## (undated) DEVICE — SU MONOCRYL 4-0 PS-2 18" UND Y496G

## (undated) DEVICE — ENDO SCOPE WARMER LF TM500

## (undated) DEVICE — ESU CORD MONOPOLAR 10'  E0510

## (undated) DEVICE — SUCTION IRR STRYKERFLOW II W/TIP 250-070-520

## (undated) DEVICE — ENDO TROCAR FIRST ENTRY KII FIOS Z-THRD 05X100MM CTF03

## (undated) DEVICE — ENDO POUCH UNIV RETRIEVAL SYSTEM INZII 10MM CD001

## (undated) DEVICE — ENDO TROCAR SLEEVE KII Z-THREADED 05X100MM CTS02

## (undated) DEVICE — ENDO TROCAR FIRST ENTRY KII FIOS Z-THRD 12X100MM CTF73

## (undated) DEVICE — ESU HOLDER LAP INST DISP PURPLE LONG 330MM H-PRO-330

## (undated) DEVICE — SOL NACL 0.9% INJ 1000ML BAG 2B1324X

## (undated) DEVICE — SU VICRYL 3-0 SH 27" J316H

## (undated) DEVICE — STPL ENDO ARTICULATING 45MM EC45A

## (undated) DEVICE — DECANTER VIAL 2006S

## (undated) DEVICE — PACK LAP CHOLE SLC15LCFSD

## (undated) DEVICE — APPLICATOR EVICEL FLEX TIP 45CM 3909

## (undated) DEVICE — GLOVE PROTEXIS MICRO 7.5  2D73PM75

## (undated) DEVICE — LINEN TOWEL PACK X5 5464

## (undated) DEVICE — SU VICRYL 2-0 SH 27" J317H

## (undated) DEVICE — SU VICRYL 0 UR-6 27" J603H

## (undated) DEVICE — PREP CHLORAPREP 26ML TINTED ORANGE  260815

## (undated) DEVICE — CLIP APPLIER ENDO 5MM M/L LIGAMAX EL5ML

## (undated) DEVICE — EVAC SYSTEM CLEAR FLOW SC082500

## (undated) DEVICE — Device

## (undated) DEVICE — APPLICATOR EVICEL RIGID TIP 35CM 3908

## (undated) DEVICE — GLOVE PROTEXIS BLUE W/NEU-THERA 7.5  2D73EB75

## (undated) RX ORDER — HYDROMORPHONE HYDROCHLORIDE 1 MG/ML
INJECTION, SOLUTION INTRAMUSCULAR; INTRAVENOUS; SUBCUTANEOUS
Status: DISPENSED
Start: 2020-03-18

## (undated) RX ORDER — DIPHENHYDRAMINE HYDROCHLORIDE 50 MG/ML
INJECTION INTRAMUSCULAR; INTRAVENOUS
Status: DISPENSED
Start: 2020-03-18

## (undated) RX ORDER — ACETAMINOPHEN 500 MG
TABLET ORAL
Status: DISPENSED
Start: 2020-03-18

## (undated) RX ORDER — HYDROXYZINE HYDROCHLORIDE 50 MG/ML
INJECTION, SOLUTION INTRAMUSCULAR
Status: DISPENSED
Start: 2020-03-18

## (undated) RX ORDER — VECURONIUM BROMIDE 1 MG/ML
INJECTION, POWDER, LYOPHILIZED, FOR SOLUTION INTRAVENOUS
Status: DISPENSED
Start: 2020-03-18

## (undated) RX ORDER — NEOSTIGMINE METHYLSULFATE 1 MG/ML
VIAL (ML) INJECTION
Status: DISPENSED
Start: 2020-03-18

## (undated) RX ORDER — FENTANYL CITRATE 50 UG/ML
INJECTION, SOLUTION INTRAMUSCULAR; INTRAVENOUS
Status: DISPENSED
Start: 2020-03-18

## (undated) RX ORDER — ALBUTEROL SULFATE 90 UG/1
AEROSOL, METERED RESPIRATORY (INHALATION)
Status: DISPENSED
Start: 2020-03-18

## (undated) RX ORDER — PROPOFOL 10 MG/ML
INJECTION, EMULSION INTRAVENOUS
Status: DISPENSED
Start: 2020-03-18

## (undated) RX ORDER — CEFAZOLIN SODIUM 1 G/3ML
INJECTION, POWDER, FOR SOLUTION INTRAMUSCULAR; INTRAVENOUS
Status: DISPENSED
Start: 2020-03-18

## (undated) RX ORDER — KETOROLAC TROMETHAMINE 30 MG/ML
INJECTION, SOLUTION INTRAMUSCULAR; INTRAVENOUS
Status: DISPENSED
Start: 2020-03-18

## (undated) RX ORDER — GLYCOPYRROLATE 0.2 MG/ML
INJECTION, SOLUTION INTRAMUSCULAR; INTRAVENOUS
Status: DISPENSED
Start: 2020-03-18

## (undated) RX ORDER — CEFAZOLIN SODIUM IN 0.9 % NACL 3 G/100 ML
INTRAVENOUS SOLUTION, PIGGYBACK (ML) INTRAVENOUS
Status: DISPENSED
Start: 2020-03-18